# Patient Record
Sex: MALE | Race: BLACK OR AFRICAN AMERICAN | NOT HISPANIC OR LATINO | Employment: UNEMPLOYED | URBAN - METROPOLITAN AREA
[De-identification: names, ages, dates, MRNs, and addresses within clinical notes are randomized per-mention and may not be internally consistent; named-entity substitution may affect disease eponyms.]

---

## 2023-03-08 LAB
ALANINE AMINOTRANSFERASE (SGPT) (U/L) IN SER/PLAS: 18 U/L (ref 10–52)
ALBUMIN (G/DL) IN SER/PLAS: 3.9 G/DL (ref 3.4–5)
ALKALINE PHOSPHATASE (U/L) IN SER/PLAS: 56 U/L (ref 33–120)
ANION GAP IN SER/PLAS: 12 MMOL/L (ref 10–20)
APPEARANCE, URINE: ABNORMAL
ASPARTATE AMINOTRANSFERASE (SGOT) (U/L) IN SER/PLAS: 23 U/L (ref 9–39)
BASOPHILS (10*3/UL) IN BLOOD BY AUTOMATED COUNT: 0.07 X10E9/L (ref 0–0.1)
BASOPHILS/100 LEUKOCYTES IN BLOOD BY AUTOMATED COUNT: 0.7 % (ref 0–2)
BILIRUBIN TOTAL (MG/DL) IN SER/PLAS: 0.6 MG/DL (ref 0–1.2)
BILIRUBIN, URINE: NEGATIVE
BLOOD, URINE: NEGATIVE
CALCIDIOL (25 OH VITAMIN D3) (NG/ML) IN SER/PLAS: 14 NG/ML
CALCIUM (MG/DL) IN SER/PLAS: 9.1 MG/DL (ref 8.6–10.6)
CARBON DIOXIDE, TOTAL (MMOL/L) IN SER/PLAS: 28 MMOL/L (ref 21–32)
CHLORIDE (MMOL/L) IN SER/PLAS: 100 MMOL/L (ref 98–107)
CHOLESTEROL (MG/DL) IN SER/PLAS: 111 MG/DL (ref 0–199)
CHOLESTEROL IN HDL (MG/DL) IN SER/PLAS: 54.6 MG/DL
CHOLESTEROL/HDL RATIO: 2
COLOR, URINE: ABNORMAL
CREATININE (MG/DL) IN SER/PLAS: 1.11 MG/DL (ref 0.5–1.3)
EOSINOPHILS (10*3/UL) IN BLOOD BY AUTOMATED COUNT: 0.1 X10E9/L (ref 0–0.7)
EOSINOPHILS/100 LEUKOCYTES IN BLOOD BY AUTOMATED COUNT: 1 % (ref 0–6)
ERYTHROCYTE DISTRIBUTION WIDTH (RATIO) BY AUTOMATED COUNT: 12.2 % (ref 11.5–14.5)
ERYTHROCYTE MEAN CORPUSCULAR HEMOGLOBIN CONCENTRATION (G/DL) BY AUTOMATED: 35.8 G/DL (ref 32–36)
ERYTHROCYTE MEAN CORPUSCULAR VOLUME (FL) BY AUTOMATED COUNT: 79 FL (ref 80–100)
ERYTHROCYTES (10*6/UL) IN BLOOD BY AUTOMATED COUNT: 4.34 X10E12/L (ref 4.5–5.9)
FIBRIN D-DIMER (NG/ML FEU) IN PLATELET POOR PLASMA: 444 NG/ML FEU
GFR MALE: >90 ML/MIN/1.73M2
GLUCOSE (MG/DL) IN SER/PLAS: 72 MG/DL (ref 74–99)
GLUCOSE, URINE: NEGATIVE MG/DL
HEMATOCRIT (%) IN BLOOD BY AUTOMATED COUNT: 34.4 % (ref 41–52)
HEMOGLOBIN (G/DL) IN BLOOD: 12.3 G/DL (ref 13.5–17.5)
IMMATURE GRANULOCYTES/100 LEUKOCYTES IN BLOOD BY AUTOMATED COUNT: 0.2 % (ref 0–0.9)
KETONES, URINE: ABNORMAL MG/DL
LDL: 38 MG/DL (ref 0–99)
LEUKOCYTE ESTERASE, URINE: ABNORMAL
LEUKOCYTES (10*3/UL) IN BLOOD BY AUTOMATED COUNT: 9.9 X10E9/L (ref 4.4–11.3)
LYMPHOCYTES (10*3/UL) IN BLOOD BY AUTOMATED COUNT: 2.62 X10E9/L (ref 1.2–4.8)
LYMPHOCYTES/100 LEUKOCYTES IN BLOOD BY AUTOMATED COUNT: 26.5 % (ref 13–44)
MONOCYTES (10*3/UL) IN BLOOD BY AUTOMATED COUNT: 0.69 X10E9/L (ref 0.1–1)
MONOCYTES/100 LEUKOCYTES IN BLOOD BY AUTOMATED COUNT: 7 % (ref 2–10)
NEUTROPHILS (10*3/UL) IN BLOOD BY AUTOMATED COUNT: 6.37 X10E9/L (ref 1.2–7.7)
NEUTROPHILS/100 LEUKOCYTES IN BLOOD BY AUTOMATED COUNT: 64.6 % (ref 40–80)
NITRITE, URINE: NEGATIVE
NRBC (PER 100 WBCS) BY AUTOMATED COUNT: 0 /100 WBC (ref 0–0)
PH, URINE: 5 (ref 5–8)
PLATELETS (10*3/UL) IN BLOOD AUTOMATED COUNT: 286 X10E9/L (ref 150–450)
POTASSIUM (MMOL/L) IN SER/PLAS: 3.2 MMOL/L (ref 3.5–5.3)
PROTEIN TOTAL: 6.5 G/DL (ref 6.4–8.2)
PROTEIN, URINE: NEGATIVE MG/DL
RBC, URINE: 8 /HPF (ref 0–5)
SODIUM (MMOL/L) IN SER/PLAS: 137 MMOL/L (ref 136–145)
SPECIFIC GRAVITY, URINE: 1.03 (ref 1–1.03)
TRIGLYCERIDE (MG/DL) IN SER/PLAS: 93 MG/DL (ref 0–149)
UREA NITROGEN (MG/DL) IN SER/PLAS: 8 MG/DL (ref 6–23)
UROBILINOGEN, URINE: <2 MG/DL (ref 0–1.9)
VLDL: 19 MG/DL (ref 0–40)
WBC, URINE: ABNORMAL /HPF (ref 0–5)

## 2023-03-14 LAB
CYSTATIN C: 1.05 MG/L (ref 0.63–1.03)
EGFR BY CYSTATIN C: 82 ML/MIN/BSA

## 2023-03-21 ENCOUNTER — DOCUMENTATION (OUTPATIENT)
Dept: CARE COORDINATION | Facility: CLINIC | Age: 33
End: 2023-03-21
Payer: COMMERCIAL

## 2023-03-22 ENCOUNTER — PATIENT OUTREACH (OUTPATIENT)
Dept: CARE COORDINATION | Facility: CLINIC | Age: 33
End: 2023-03-22
Payer: COMMERCIAL

## 2023-05-31 LAB — GLUCOSE (MG/DL) IN SER/PLAS: 101 MG/DL (ref 74–99)

## 2023-06-01 LAB
GLUCOSE TOLERANCE TEST FASTING FOR 3 HOUR NON-PREGNANCY: 80 MG/DL
GLUCOSE TOLERANCE TEST ONE HOUR: 93 MG/DL
GLUCOSE TOLERANCE TEST THREE HOUR: NORMAL MG/DL
GLUCOSE TOLERANCE TEST TWO HOUR FOR 3 HOUR NON FASTING: 92 MG/DL
GTTC2: NORMAL

## 2023-11-23 ENCOUNTER — CLINICAL SUPPORT (OUTPATIENT)
Dept: CARDIOLOGY | Facility: CLINIC | Age: 33
End: 2023-11-23
Payer: COMMERCIAL

## 2023-11-23 ENCOUNTER — HOSPITAL ENCOUNTER (INPATIENT)
Facility: HOSPITAL | Age: 33
LOS: 2 days | Discharge: HOME | End: 2023-11-25
Attending: STUDENT IN AN ORGANIZED HEALTH CARE EDUCATION/TRAINING PROGRAM | Admitting: INTERNAL MEDICINE
Payer: COMMERCIAL

## 2023-11-23 ENCOUNTER — APPOINTMENT (OUTPATIENT)
Dept: RADIOLOGY | Facility: HOSPITAL | Age: 33
End: 2023-11-23
Payer: COMMERCIAL

## 2023-11-23 DIAGNOSIS — E87.5 HYPERKALEMIA: Primary | ICD-10-CM

## 2023-11-23 DIAGNOSIS — N17.9 AKI (ACUTE KIDNEY INJURY) (CMS-HCC): ICD-10-CM

## 2023-11-23 DIAGNOSIS — A41.9 SEPSIS, DUE TO UNSPECIFIED ORGANISM, UNSPECIFIED WHETHER ACUTE ORGAN DYSFUNCTION PRESENT (MULTI): ICD-10-CM

## 2023-11-23 DIAGNOSIS — R11.2 NAUSEA AND VOMITING, UNSPECIFIED VOMITING TYPE: ICD-10-CM

## 2023-11-23 LAB
ALBUMIN SERPL BCP-MCNC: 5.9 G/DL (ref 3.4–5)
ALP SERPL-CCNC: 89 U/L (ref 33–120)
ALT SERPL W P-5'-P-CCNC: 34 U/L (ref 10–52)
ANION GAP SERPL CALC-SCNC: 24 MMOL/L (ref 10–20)
AST SERPL W P-5'-P-CCNC: 41 U/L (ref 9–39)
BASOPHILS # BLD AUTO: 0.13 X10*3/UL (ref 0–0.1)
BASOPHILS NFR BLD AUTO: 0.5 %
BILIRUB SERPL-MCNC: 0.6 MG/DL (ref 0–1.2)
BUN SERPL-MCNC: 26 MG/DL (ref 6–23)
CALCIUM SERPL-MCNC: 11.2 MG/DL (ref 8.6–10.3)
CHLORIDE SERPL-SCNC: 96 MMOL/L (ref 98–107)
CO2 SERPL-SCNC: 18 MMOL/L (ref 21–32)
CREAT SERPL-MCNC: 3.5 MG/DL (ref 0.5–1.3)
EOSINOPHIL # BLD AUTO: 0.01 X10*3/UL (ref 0–0.7)
EOSINOPHIL NFR BLD AUTO: 0 %
ERYTHROCYTE [DISTWIDTH] IN BLOOD BY AUTOMATED COUNT: 14.6 % (ref 11.5–14.5)
GFR SERPL CREATININE-BSD FRML MDRD: 23 ML/MIN/1.73M*2
GLUCOSE SERPL-MCNC: 93 MG/DL (ref 74–99)
HCT VFR BLD AUTO: 55.8 % (ref 41–52)
HGB BLD-MCNC: 19.6 G/DL (ref 13.5–17.5)
IMM GRANULOCYTES # BLD AUTO: 0.21 X10*3/UL (ref 0–0.7)
IMM GRANULOCYTES NFR BLD AUTO: 0.8 % (ref 0–0.9)
LACTATE SERPL-SCNC: 2.5 MMOL/L (ref 0.4–2)
LACTATE SERPL-SCNC: 3.5 MMOL/L (ref 0.4–2)
LIPASE SERPL-CCNC: 77 U/L (ref 9–82)
LYMPHOCYTES # BLD AUTO: 2.4 X10*3/UL (ref 1.2–4.8)
LYMPHOCYTES NFR BLD AUTO: 9.4 %
MCH RBC QN AUTO: 28.9 PG (ref 26–34)
MCHC RBC AUTO-ENTMCNC: 35.1 G/DL (ref 32–36)
MCV RBC AUTO: 82 FL (ref 80–100)
MONOCYTES # BLD AUTO: 1.62 X10*3/UL (ref 0.1–1)
MONOCYTES NFR BLD AUTO: 6.3 %
NEUTROPHILS # BLD AUTO: 21.26 X10*3/UL (ref 1.2–7.7)
NEUTROPHILS NFR BLD AUTO: 83 %
NRBC BLD-RTO: 0 /100 WBCS (ref 0–0)
PLATELET # BLD AUTO: 374 X10*3/UL (ref 150–450)
POTASSIUM SERPL-SCNC: 7.7 MMOL/L (ref 3.5–5.3)
PROT SERPL-MCNC: 11.2 G/DL (ref 6.4–8.2)
RBC # BLD AUTO: 6.79 X10*6/UL (ref 4.5–5.9)
SODIUM SERPL-SCNC: 130 MMOL/L (ref 136–145)
WBC # BLD AUTO: 25.6 X10*3/UL (ref 4.4–11.3)

## 2023-11-23 PROCEDURE — 36415 COLL VENOUS BLD VENIPUNCTURE: CPT | Performed by: STUDENT IN AN ORGANIZED HEALTH CARE EDUCATION/TRAINING PROGRAM

## 2023-11-23 PROCEDURE — 71045 X-RAY EXAM CHEST 1 VIEW: CPT | Performed by: RADIOLOGY

## 2023-11-23 PROCEDURE — 71045 X-RAY EXAM CHEST 1 VIEW: CPT

## 2023-11-23 PROCEDURE — 2060000001 HC INTERMEDIATE ICU ROOM DAILY

## 2023-11-23 PROCEDURE — 2500000004 HC RX 250 GENERAL PHARMACY W/ HCPCS (ALT 636 FOR OP/ED): Performed by: INTERNAL MEDICINE

## 2023-11-23 PROCEDURE — 83605 ASSAY OF LACTIC ACID: CPT | Performed by: STUDENT IN AN ORGANIZED HEALTH CARE EDUCATION/TRAINING PROGRAM

## 2023-11-23 PROCEDURE — 96365 THER/PROPH/DIAG IV INF INIT: CPT

## 2023-11-23 PROCEDURE — 2500000002 HC RX 250 W HCPCS SELF ADMINISTERED DRUGS (ALT 637 FOR MEDICARE OP, ALT 636 FOR OP/ED): Performed by: STUDENT IN AN ORGANIZED HEALTH CARE EDUCATION/TRAINING PROGRAM

## 2023-11-23 PROCEDURE — 85025 COMPLETE CBC W/AUTO DIFF WBC: CPT | Performed by: STUDENT IN AN ORGANIZED HEALTH CARE EDUCATION/TRAINING PROGRAM

## 2023-11-23 PROCEDURE — 83690 ASSAY OF LIPASE: CPT | Performed by: STUDENT IN AN ORGANIZED HEALTH CARE EDUCATION/TRAINING PROGRAM

## 2023-11-23 PROCEDURE — 99285 EMERGENCY DEPT VISIT HI MDM: CPT | Mod: 25,27 | Performed by: STUDENT IN AN ORGANIZED HEALTH CARE EDUCATION/TRAINING PROGRAM

## 2023-11-23 PROCEDURE — 87075 CULTR BACTERIA EXCEPT BLOOD: CPT | Mod: PARLAB | Performed by: STUDENT IN AN ORGANIZED HEALTH CARE EDUCATION/TRAINING PROGRAM

## 2023-11-23 PROCEDURE — 87040 BLOOD CULTURE FOR BACTERIA: CPT | Mod: PARLAB | Performed by: STUDENT IN AN ORGANIZED HEALTH CARE EDUCATION/TRAINING PROGRAM

## 2023-11-23 PROCEDURE — 96367 TX/PROPH/DG ADDL SEQ IV INF: CPT

## 2023-11-23 PROCEDURE — 2500000001 HC RX 250 WO HCPCS SELF ADMINISTERED DRUGS (ALT 637 FOR MEDICARE OP): Performed by: STUDENT IN AN ORGANIZED HEALTH CARE EDUCATION/TRAINING PROGRAM

## 2023-11-23 PROCEDURE — 74176 CT ABD & PELVIS W/O CONTRAST: CPT | Performed by: RADIOLOGY

## 2023-11-23 PROCEDURE — 80053 COMPREHEN METABOLIC PANEL: CPT | Performed by: STUDENT IN AN ORGANIZED HEALTH CARE EDUCATION/TRAINING PROGRAM

## 2023-11-23 PROCEDURE — 2500000004 HC RX 250 GENERAL PHARMACY W/ HCPCS (ALT 636 FOR OP/ED): Performed by: STUDENT IN AN ORGANIZED HEALTH CARE EDUCATION/TRAINING PROGRAM

## 2023-11-23 PROCEDURE — 74176 CT ABD & PELVIS W/O CONTRAST: CPT

## 2023-11-23 PROCEDURE — 2500000005 HC RX 250 GENERAL PHARMACY W/O HCPCS: Performed by: STUDENT IN AN ORGANIZED HEALTH CARE EDUCATION/TRAINING PROGRAM

## 2023-11-23 PROCEDURE — 96375 TX/PRO/DX INJ NEW DRUG ADDON: CPT

## 2023-11-23 PROCEDURE — 93005 ELECTROCARDIOGRAM TRACING: CPT

## 2023-11-23 RX ORDER — SODIUM POLYSTYRENE SULFONATE 15 G/60ML
30 SUSPENSION ORAL; RECTAL ONCE
Status: COMPLETED | OUTPATIENT
Start: 2023-11-23 | End: 2023-11-23

## 2023-11-23 RX ORDER — CALCIUM GLUCONATE 20 MG/ML
2 INJECTION, SOLUTION INTRAVENOUS ONCE
Status: COMPLETED | OUTPATIENT
Start: 2023-11-23 | End: 2023-11-23

## 2023-11-23 RX ORDER — ACETAMINOPHEN 160 MG/5ML
650 SOLUTION ORAL EVERY 4 HOURS PRN
Status: DISCONTINUED | OUTPATIENT
Start: 2023-11-23 | End: 2023-11-23

## 2023-11-23 RX ORDER — ONDANSETRON 4 MG/1
4 TABLET, FILM COATED ORAL EVERY 8 HOURS PRN
Status: DISCONTINUED | OUTPATIENT
Start: 2023-11-23 | End: 2023-11-23

## 2023-11-23 RX ORDER — MORPHINE SULFATE 4 MG/ML
4 INJECTION, SOLUTION INTRAMUSCULAR; INTRAVENOUS ONCE
Status: COMPLETED | OUTPATIENT
Start: 2023-11-23 | End: 2023-11-23

## 2023-11-23 RX ORDER — DEXTROSE 50 % IN WATER (D50W) INTRAVENOUS SYRINGE
25 ONCE
Status: COMPLETED | OUTPATIENT
Start: 2023-11-23 | End: 2023-11-23

## 2023-11-23 RX ORDER — TALC
3 POWDER (GRAM) TOPICAL DAILY
Status: DISCONTINUED | OUTPATIENT
Start: 2023-11-23 | End: 2023-11-23

## 2023-11-23 RX ORDER — ACETAMINOPHEN 325 MG/1
650 TABLET ORAL EVERY 4 HOURS PRN
Status: DISCONTINUED | OUTPATIENT
Start: 2023-11-23 | End: 2023-11-23

## 2023-11-23 RX ORDER — DEXTROSE MONOHYDRATE AND SODIUM CHLORIDE 5; .9 G/100ML; G/100ML
150 INJECTION, SOLUTION INTRAVENOUS CONTINUOUS
Status: DISCONTINUED | OUTPATIENT
Start: 2023-11-23 | End: 2023-11-23

## 2023-11-23 RX ORDER — DEXTROSE MONOHYDRATE AND SODIUM CHLORIDE 5; .9 G/100ML; G/100ML
200 INJECTION, SOLUTION INTRAVENOUS CONTINUOUS
Status: DISCONTINUED | OUTPATIENT
Start: 2023-11-23 | End: 2023-11-25 | Stop reason: HOSPADM

## 2023-11-23 RX ORDER — ONDANSETRON HYDROCHLORIDE 2 MG/ML
4 INJECTION, SOLUTION INTRAVENOUS EVERY 8 HOURS PRN
Status: DISCONTINUED | OUTPATIENT
Start: 2023-11-23 | End: 2023-11-23

## 2023-11-23 RX ORDER — ACETAMINOPHEN 650 MG/1
650 SUPPOSITORY RECTAL EVERY 4 HOURS PRN
Status: DISCONTINUED | OUTPATIENT
Start: 2023-11-23 | End: 2023-11-23

## 2023-11-23 RX ORDER — ONDANSETRON HYDROCHLORIDE 2 MG/ML
4 INJECTION, SOLUTION INTRAVENOUS EVERY 6 HOURS PRN
Status: DISCONTINUED | OUTPATIENT
Start: 2023-11-23 | End: 2023-11-25 | Stop reason: HOSPADM

## 2023-11-23 RX ORDER — ONDANSETRON HYDROCHLORIDE 2 MG/ML
4 INJECTION, SOLUTION INTRAVENOUS ONCE
Status: COMPLETED | OUTPATIENT
Start: 2023-11-23 | End: 2023-11-23

## 2023-11-23 RX ORDER — ONDANSETRON HYDROCHLORIDE 2 MG/ML
4 INJECTION, SOLUTION INTRAVENOUS EVERY 6 HOURS PRN
Status: DISCONTINUED | OUTPATIENT
Start: 2023-11-23 | End: 2023-11-23

## 2023-11-23 RX ADMIN — CALCIUM GLUCONATE 2 G: 20 INJECTION, SOLUTION INTRAVENOUS at 18:24

## 2023-11-23 RX ADMIN — SODIUM POLYSTYRENE SULFONATE 30 G: 15 SUSPENSION ORAL; RECTAL at 18:24

## 2023-11-23 RX ADMIN — INSULIN HUMAN 10 UNITS: 100 INJECTION, SOLUTION PARENTERAL at 18:25

## 2023-11-23 RX ADMIN — MORPHINE SULFATE 4 MG: 4 INJECTION, SOLUTION INTRAMUSCULAR; INTRAVENOUS at 17:03

## 2023-11-23 RX ADMIN — SODIUM CHLORIDE 1000 ML: 9 INJECTION, SOLUTION INTRAVENOUS at 18:32

## 2023-11-23 RX ADMIN — SODIUM CHLORIDE 1000 ML: 9 INJECTION, SOLUTION INTRAVENOUS at 17:03

## 2023-11-23 RX ADMIN — PIPERACILLIN SODIUM AND TAZOBACTAM SODIUM 4.5 G: 4; .5 INJECTION, SOLUTION INTRAVENOUS at 18:24

## 2023-11-23 RX ADMIN — DEXTROSE MONOHYDRATE 25 G: 25 INJECTION, SOLUTION INTRAVENOUS at 18:24

## 2023-11-23 RX ADMIN — ONDANSETRON 4 MG: 2 INJECTION INTRAMUSCULAR; INTRAVENOUS at 17:03

## 2023-11-23 RX ADMIN — DEXTROSE MONOHYDRATE AND SODIUM CHLORIDE 200 ML/HR: 5; .9 INJECTION, SOLUTION INTRAVENOUS at 22:20

## 2023-11-23 SDOH — SOCIAL STABILITY: SOCIAL INSECURITY: ABUSE: ADULT

## 2023-11-23 SDOH — SOCIAL STABILITY: SOCIAL INSECURITY: HAVE YOU HAD THOUGHTS OF HARMING ANYONE ELSE?: NO

## 2023-11-23 SDOH — SOCIAL STABILITY: SOCIAL INSECURITY: DO YOU FEEL ANYONE HAS EXPLOITED OR TAKEN ADVANTAGE OF YOU FINANCIALLY OR OF YOUR PERSONAL PROPERTY?: NO

## 2023-11-23 SDOH — SOCIAL STABILITY: SOCIAL INSECURITY: WERE YOU ABLE TO COMPLETE ALL THE BEHAVIORAL HEALTH SCREENINGS?: YES

## 2023-11-23 SDOH — SOCIAL STABILITY: SOCIAL INSECURITY: DOES ANYONE TRY TO KEEP YOU FROM HAVING/CONTACTING OTHER FRIENDS OR DOING THINGS OUTSIDE YOUR HOME?: NO

## 2023-11-23 SDOH — SOCIAL STABILITY: SOCIAL INSECURITY: ARE YOU OR HAVE YOU BEEN THREATENED OR ABUSED PHYSICALLY, EMOTIONALLY, OR SEXUALLY BY ANYONE?: NO

## 2023-11-23 SDOH — SOCIAL STABILITY: SOCIAL INSECURITY: ARE THERE ANY APPARENT SIGNS OF INJURIES/BEHAVIORS THAT COULD BE RELATED TO ABUSE/NEGLECT?: NO

## 2023-11-23 SDOH — SOCIAL STABILITY: SOCIAL INSECURITY: DO YOU FEEL UNSAFE GOING BACK TO THE PLACE WHERE YOU ARE LIVING?: NO

## 2023-11-23 SDOH — SOCIAL STABILITY: SOCIAL INSECURITY: HAS ANYONE EVER THREATENED TO HURT YOUR FAMILY OR YOUR PETS?: NO

## 2023-11-23 ASSESSMENT — ACTIVITIES OF DAILY LIVING (ADL)
JUDGMENT_ADEQUATE_SAFELY_COMPLETE_DAILY_ACTIVITIES: YES
LACK_OF_TRANSPORTATION: NO
DRESSING YOURSELF: INDEPENDENT
GROOMING: INDEPENDENT
HEARING - RIGHT EAR: FUNCTIONAL
BATHING: INDEPENDENT
ADEQUATE_TO_COMPLETE_ADL: YES
PATIENT'S MEMORY ADEQUATE TO SAFELY COMPLETE DAILY ACTIVITIES?: YES
FEEDING YOURSELF: INDEPENDENT
WALKS IN HOME: INDEPENDENT
HEARING - LEFT EAR: FUNCTIONAL
TOILETING: INDEPENDENT

## 2023-11-23 ASSESSMENT — COGNITIVE AND FUNCTIONAL STATUS - GENERAL
DAILY ACTIVITIY SCORE: 24
PATIENT BASELINE BEDBOUND: NO
MOBILITY SCORE: 24

## 2023-11-23 ASSESSMENT — PAIN SCALES - GENERAL: PAINLEVEL_OUTOF10: 0 - NO PAIN

## 2023-11-23 ASSESSMENT — PAIN - FUNCTIONAL ASSESSMENT: PAIN_FUNCTIONAL_ASSESSMENT: 0-10

## 2023-11-23 ASSESSMENT — LIFESTYLE VARIABLES
HOW OFTEN DO YOU HAVE A DRINK CONTAINING ALCOHOL: NEVER
AUDIT-C TOTAL SCORE: 0
AUDIT-C TOTAL SCORE: 0
SKIP TO QUESTIONS 9-10: 1
HOW OFTEN DO YOU HAVE 6 OR MORE DRINKS ON ONE OCCASION: NEVER
HOW MANY STANDARD DRINKS CONTAINING ALCOHOL DO YOU HAVE ON A TYPICAL DAY: PATIENT DOES NOT DRINK

## 2023-11-23 ASSESSMENT — PATIENT HEALTH QUESTIONNAIRE - PHQ9
SUM OF ALL RESPONSES TO PHQ9 QUESTIONS 1 & 2: 0
2. FEELING DOWN, DEPRESSED OR HOPELESS: NOT AT ALL
1. LITTLE INTEREST OR PLEASURE IN DOING THINGS: NOT AT ALL

## 2023-11-23 ASSESSMENT — COLUMBIA-SUICIDE SEVERITY RATING SCALE - C-SSRS
2. HAVE YOU ACTUALLY HAD ANY THOUGHTS OF KILLING YOURSELF?: NO
6. HAVE YOU EVER DONE ANYTHING, STARTED TO DO ANYTHING, OR PREPARED TO DO ANYTHING TO END YOUR LIFE?: NO
1. IN THE PAST MONTH, HAVE YOU WISHED YOU WERE DEAD OR WISHED YOU COULD GO TO SLEEP AND NOT WAKE UP?: NO

## 2023-11-23 NOTE — ED PROVIDER NOTES
EMERGENCY MEDICINE EVALUATION NOTE    History of Present Illness     Chief Complaint:   Chief Complaint   Patient presents with    Nausea     Pt c/o nausea and vomiting since 3am . Pt has osotomy and j pouch       HPI: Ingrid Maurice is a 32 y.o. male with past medical history of familial adenomatosis polyposis and remote colostomy as well as right nephrectomy presents with complaint of nausea, vomiting, and abdominal pain.  Patient states starting at around 0300 today he developed a sudden onset of nausea, vomiting, and generalized abdominal pain.  He does state some liquid stool in his colostomy bag although denies any blood noted.  Denies any hematemesis.  He does admit diffuse generalized abdominal pain as well as multiple episodes of nonbloody emesis throughout today.  He does admit multiple similar symptoms in the past.  Denies any change in urination, dysuria, fever, chills, chest pain, shortness of breath.    Previous History     Past Medical History:   Diagnosis Date    Hemorrhage of anus and rectum     Hemorrhage of rectum and anus     Past Surgical History:   Procedure Laterality Date    ABDOMINAL ADHESION SURGERY  10/14/2013    Laparoscopic Lysis Of Intestinal Adhesions    CT GUIDED PERCUTANEOUS PERITONEAL OR RETROPERITONEAL FLUID COLLECTION DRAINAGE  7/3/2018    CT GUIDED PERCUTANEOUS PERITONEAL OR RETROPERITONEAL FLUID COLLECTION DRAINAGE 7/3/2018 [facility] CLINICAL LEGACY    EXPLORATORY LAPAROTOMY  10/14/2013    Exploratory Laparotomy    IR GI TUBE EXCHANGE  1/8/2013    IR GI TUBE EXCHANGE 1/8/2013 [facility]    IR GI TUBE EXCHANGE  3/13/2013    IR GI TUBE EXCHANGE 3/13/2013 [facility] CLINICAL LEGACY    IR GI TUBE EXCHANGE  4/12/2013    IR GI TUBE EXCHANGE 4/12/2013 [facility] EMERGENCY LEGACY    IR GI TUBE EXCHANGE  6/19/2013    IR GI TUBE EXCHANGE 6/19/2013 [facility] CLINICAL LEGACY    IR GI TUBE EXCHANGE  7/24/2013    IR GI TUBE EXCHANGE 7/24/2013 [facility] CLINICAL LEGACY    IR GI TUBE EXCHANGE  11/25/2013    IR GI  TUBE EXCHANGE 11/25/2013 [facility] CLINICAL LEGACY    IR GI TUBE EXCHANGE  3/6/2014    IR GI TUBE EXCHANGE 3/6/2014 [facility] CLINICAL LEGACY    IR GI TUBE EXCHANGE  5/2/2014    IR GI TUBE EXCHANGE 5/2/2014 [facility] CLINICAL LEGACY    IR GI TUBE EXCHANGE  6/9/2014    IR GI TUBE EXCHANGE 6/9/2014 [facility]    IR GI TUBE EXCHANGE  7/14/2014    IR GI TUBE EXCHANGE 7/14/2014 [facility] CLINICAL LEGACY    IR GI TUBE EXCHANGE  8/18/2014    IR GI TUBE EXCHANGE 8/18/2014 [facility] CLINICAL LEGACY    IR GI TUBE EXCHANGE  10/7/2014    IR GI TUBE EXCHANGE 10/7/2014 [facility]    IR GI TUBE EXCHANGE  11/30/2014    IR GI TUBE EXCHANGE 11/30/2014 [facility] CLINICAL LEGACY    IR GI TUBE EXCHANGE  12/30/2014    IR GI TUBE EXCHANGE 12/30/2014 [facility]    IR GI TUBE EXCHANGE  4/8/2015    IR GI TUBE EXCHANGE 4/8/2015 [facility]    IR GI TUBE EXCHANGE  6/5/2015    IR GI TUBE EXCHANGE 6/5/2015 [facility]    IR GI TUBE EXCHANGE  8/3/2015    IR GI TUBE EXCHANGE 8/3/2015 [facility] CLINICAL LEGACY    IR GI TUBE EXCHANGE  11/25/2015    IR GI TUBE EXCHANGE 11/25/2015 [facility]    IR GI TUBE EXCHANGE  12/28/2015    IR GI TUBE EXCHANGE 12/28/2015 [facility] CLINICAL LEGACY    IR GI TUBE EXCHANGE  3/21/2012    IR GI TUBE EXCHANGE 3/21/2012 [facility]    IR GI TUBE EXCHANGE  5/24/2012    IR GI TUBE EXCHANGE 5/24/2012 [facility]    IR GI TUBE EXCHANGE  8/7/2012    IR GI TUBE EXCHANGE 8/7/2012 [facility]    IR GI TUBE EXCHANGE  9/5/2012    IR GI TUBE EXCHANGE 9/5/2012 [facility]    IR GI TUBE EXCHANGE  11/5/2012    IR GI TUBE EXCHANGE 11/5/2012 [facility] CLINICAL LEGACY    IR NEPHROSTOMY TUBE EXCHANGE  1/8/2013    IR NEPHROSTOMY TUBE EXCHANGE 1/8/2013 [facility]    IR NEPHROSTOMY TUBE EXCHANGE  3/13/2013    IR NEPHROSTOMY TUBE EXCHANGE 3/13/2013 [facility] CLINICAL LEGACY    IR NEPHROSTOMY TUBE EXCHANGE  4/12/2013    IR NEPHROSTOMY TUBE EXCHANGE 4/12/2013 [facility]    IR NEPHROSTOMY TUBE EXCHANGE  6/19/2013    IR NEPHROSTOMY TUBE  EXCHANGE 6/19/2013 [facility] CLINICAL LEGACY    IR NEPHROSTOMY TUBE EXCHANGE  7/24/2013    IR NEPHROSTOMY TUBE EXCHANGE 7/24/2013 [facility] CLINICAL LEGACY    IR NEPHROSTOMY TUBE EXCHANGE  11/25/2013    IR NEPHROSTOMY TUBE EXCHANGE 11/25/2013 [facility] CLINICAL LEGACY    IR NEPHROSTOMY TUBE EXCHANGE  3/6/2014    IR NEPHROSTOMY TUBE EXCHANGE 3/6/2014 [facility] CLINICAL LEGACY    IR NEPHROSTOMY TUBE EXCHANGE  5/2/2014    IR NEPHROSTOMY TUBE EXCHANGE 5/2/2014 [facility] CLINICAL LEGACY    IR NEPHROSTOMY TUBE EXCHANGE  6/9/2014    IR NEPHROSTOMY TUBE EXCHANGE 6/9/2014 [facility]    IR NEPHROSTOMY TUBE EXCHANGE  7/14/2014    IR NEPHROSTOMY TUBE EXCHANGE 7/14/2014 [facility] CLINICAL LEGACY    IR NEPHROSTOMY TUBE EXCHANGE  8/18/2014    IR NEPHROSTOMY TUBE EXCHANGE 8/18/2014 [facility] CLINICAL LEGACY    IR NEPHROSTOMY TUBE EXCHANGE  10/7/2014    IR NEPHROSTOMY TUBE EXCHANGE 10/7/2014 [facility]    IR NEPHROSTOMY TUBE EXCHANGE  11/30/2014    IR NEPHROSTOMY TUBE EXCHANGE 11/30/2014 [facility] CLINICAL LEGACY    IR NEPHROSTOMY TUBE EXCHANGE  12/30/2014    IR NEPHROSTOMY TUBE EXCHANGE 12/30/2014 [facility]    IR NEPHROSTOMY TUBE EXCHANGE  4/8/2015    IR NEPHROSTOMY TUBE EXCHANGE 4/8/2015 [facility]    IR NEPHROSTOMY TUBE EXCHANGE  6/5/2015    IR NEPHROSTOMY TUBE EXCHANGE 6/5/2015 [facility]    IR NEPHROSTOMY TUBE EXCHANGE  8/3/2015    IR NEPHROSTOMY TUBE EXCHANGE 8/3/2015 [facility] CLINICAL LEGACY    IR NEPHROSTOMY TUBE EXCHANGE  10/1/2015    IR NEPHROSTOMY TUBE EXCHANGE 10/1/2015 [facility]    IR NEPHROSTOMY TUBE EXCHANGE  11/25/2015    IR NEPHROSTOMY TUBE EXCHANGE 11/25/2015 [facility]    IR NEPHROSTOMY TUBE EXCHANGE  12/28/2015    IR NEPHROSTOMY TUBE EXCHANGE 12/28/2015 [facility] CLINICAL LEGACY    IR NEPHROSTOMY TUBE EXCHANGE  1/28/2016    IR NEPHROSTOMY TUBE EXCHANGE 1/28/2016 [facility]    IR NEPHROSTOMY TUBE EXCHANGE  1/10/2018    IR NEPHROSTOMY TUBE EXCHANGE 1/10/2018 [facility]    IR NEPHROSTOMY TUBE EXCHANGE  4/30/2018    IR  NEPHROSTOMY TUBE EXCHANGE 4/30/2018 [facility]    IR NEPHROSTOMY TUBE EXCHANGE  5/22/2018    IR NEPHROSTOMY TUBE EXCHANGE 5/22/2018 [facility]    IR NEPHROSTOMY TUBE EXCHANGE  1/31/2017    IR NEPHROSTOMY TUBE EXCHANGE 1/31/2017 [facility]    IR NEPHROSTOMY TUBE EXCHANGE  3/15/2017    IR NEPHROSTOMY TUBE EXCHANGE 3/15/2017 [facility]    IR NEPHROSTOMY TUBE EXCHANGE  4/19/2017    IR NEPHROSTOMY TUBE EXCHANGE 4/19/2017 [facility]    IR NEPHROSTOMY TUBE EXCHANGE  5/18/2017    IR NEPHROSTOMY TUBE EXCHANGE 5/18/2017 [facility]    IR NEPHROSTOMY TUBE EXCHANGE  6/29/2017    IR NEPHROSTOMY TUBE EXCHANGE 6/29/2017 [facility]    IR NEPHROSTOMY TUBE EXCHANGE  8/17/2017    IR NEPHROSTOMY TUBE EXCHANGE 8/17/2017 [facility]    IR NEPHROSTOMY TUBE EXCHANGE  10/13/2017    IR NEPHROSTOMY TUBE EXCHANGE 10/13/2017 [facility]    IR NEPHROSTOMY TUBE EXCHANGE  3/21/2012    IR NEPHROSTOMY TUBE EXCHANGE 3/21/2012 [facility]    IR NEPHROSTOMY TUBE EXCHANGE  5/24/2012    IR NEPHROSTOMY TUBE EXCHANGE 5/24/2012 [facility]    IR NEPHROSTOMY TUBE EXCHANGE  8/7/2012    IR NEPHROSTOMY TUBE EXCHANGE 8/7/2012 [facility]    IR NEPHROSTOMY TUBE EXCHANGE  9/5/2012    IR NEPHROSTOMY TUBE EXCHANGE 9/5/2012 [facility]    IR NEPHROSTOMY TUBE EXCHANGE  11/5/2012    IR NEPHROSTOMY TUBE EXCHANGE 11/5/2012 [facility] CLINICAL LEGACY    OTHER SURGICAL HISTORY  10/14/2013    Laparos Total Colectomy W/ Proctectomy, Ileoanal Anastomosis    OTHER SURGICAL HISTORY  10/14/2013    [place] Pouch Endoscopy    OTHER SURGICAL HISTORY  10/14/2013    Ileostomy Non-Tube    OTHER SURGICAL HISTORY  10/14/2013    Ileostomy Revision Complex    US GUIDED PERCUTANEOUS PERITONEAL OR RETROPERITONEAL FLUID COLLECTION DRAINAGE  1/2/2015    US GUIDED PERCUTANEOUS PERITONEAL OR RETROPERITONEAL FLUID COLLECTION DRAINAGE 1/2/2015 [facility] EMERGENCY LEGACY        No family history on file.  Allergies   Allergen Reactions    Coconut  Anaphylaxis and Swelling     Throat Swelling    Hydrocodone-Acetaminophen Other and Hives     tolerated 09/12 without reaction     No current outpatient medications    Physical Exam     Appearance: Alert, oriented , cooperative,  in acute distress. Well nourished & well hydrated.     Skin: Intact,  dry skin, no lesions, rash, petechiae or purpura.      Eyes: PERRLA, EOMs intact,  Conjunctiva pink with no redness or exudates. Cornea & anterior chamber are clear, Eyelids without lesions. No scleral icterus.      ENT: Hearing grossly intact. External auditory canals patent, tympanic membranes intact with visible landmarks. Nares patent, mucus membranes moist. Dentition without lesions. Pharynx clear, uvula midline.      Neck: Supple, without meningismus. Thyroid not palpable. Trachea at midline. No lymphadenopathy.     Pulmonary: Clear bilaterally with good chest wall excursion. No rales, rhonchi or wheezing. No accessory muscle use or stridor.     Cardiac: Tachycardic and otherwise normal S1, S2 without murmur, rub, gallop or extrasystole. No JVD, Carotids without bruits.     Abdomen: Soft, moderate generalized abdominal tenderness, colostomy with liquid stool in place without any blood noted, active bowel sounds.  No palpable organomegaly.  No rebound or guarding.  No CVA tenderness.     Genitourinary: Exam deferred.     Musculoskeletal: Full range of motion. no pain, edema, or deformity. Pulses full and equal. No cyanosis or clubbing.      Neurological:  Cranial nerves II through XII are grossly intact, finger-nose touch is normal, normal sensation, no weakness, no focal findings identified.     Psychiatric: Appropriate mood and affect.      Results     Labs Reviewed   COMPREHENSIVE METABOLIC PANEL - Abnormal       Result Value    Glucose 93      Sodium 130 (*)     Potassium 7.7 (*)     Chloride 96 (*)     Bicarbonate 18 (*)     Anion Gap 24 (*)     Urea Nitrogen 26 (*)     Creatinine 3.50 (*)     eGFR 23 (*)      Calcium 11.2 (*)     Albumin 5.9 (*)     Alkaline Phosphatase 89      Total Protein 11.2 (*)     AST 41 (*)     Bilirubin, Total 0.6      ALT 34     CBC WITH AUTO DIFFERENTIAL - Abnormal    WBC 25.6 (*)     nRBC 0.0      RBC 6.79 (*)     Hemoglobin 19.6 (*)     Hematocrit 55.8 (*)     MCV 82      MCH 28.9      MCHC 35.1      RDW 14.6 (*)     Platelets 374      Neutrophils % 83.0      Immature Granulocytes %, Automated 0.8      Lymphocytes % 9.4      Monocytes % 6.3      Eosinophils % 0.0      Basophils % 0.5      Neutrophils Absolute 21.26 (*)     Immature Granulocytes Absolute, Automated 0.21      Lymphocytes Absolute 2.40      Monocytes Absolute 1.62 (*)     Eosinophils Absolute 0.01      Basophils Absolute 0.13 (*)    LIPASE - Normal    Lipase 77      Narrative:     Venipuncture immediately after or during the administration of Metamizole may lead to falsely low results. Testing should be performed immediately prior to Metamizole dosing.   BLOOD CULTURE   BLOOD CULTURE   URINALYSIS WITH REFLEX MICROSCOPIC AND CULTURE   LACTATE   POTASSIUM   POCT GLUCOSE METER     XR chest 1 view   Final Result   No airspace consolidation or pleural effusion.        MACRO:   None        Signed by: Vijay Raphael 11/23/2023 7:03 PM   Dictation workstation:   [id number]      CT abdomen pelvis wo IV contrast   Final Result   Status post right nephrectomy and colectomy.        No significant bowel distention or evidence of bowel wall thickening        There is no free air free fluid collection in the abdomen or pelvis.   There is no obvious infiltration of the bowel mesentery.        The remainder of the abdomen and pelvis is unremarkable allowing for   limitations of an unenhanced study.        Signed by: Jonny Seals 11/23/2023 6:24 PM   Dictation workstation:   [id number]            ED Course & Medical Decision Making     Medications   calcium gluconate in NS IV 2 g (2 g intravenous New Bag 11/23/23 0826)   sodium chloride  "0.9 % bolus 1,000 mL (1,000 mL intravenous New Bag 11/23/23 1832)   vancomycin (Vancocin) 2 g in dextrose 5 % in water (D5W) 500 mL IV (has no administration in time range)   promethazine (Phenergan) in 0.9 % sodium chloride 50 mL IV 25 mg (has no administration in time range)   ondansetron (Zofran) injection 4 mg (4 mg intravenous Given 11/23/23 1703)   morphine injection 4 mg (4 mg intravenous Given 11/23/23 1703)   sodium chloride 0.9 % bolus 1,000 mL (0 mL intravenous Stopped 11/23/23 1733)   dextrose 50 % injection 25 g (25 g intravenous Given 11/23/23 1824)   insulin regular (HumuLIN R) injection 10 Units (10 Units intravenous Given 11/23/23 1825)   sodium polystyrene (Kayexalate) suspension 30 g (30 g oral Given 11/23/23 1824)   piperacillin-tazobactam-dextrose (Zosyn) IV 4.5 g (4.5 g intravenous New Bag 11/23/23 1824)     Diagnoses as of 11/23/23 1906   Hyperkalemia   Sepsis, due to unspecified organism, unspecified whether acute organ dysfunction present (CMS/HCC)   Nausea and vomiting, unspecified vomiting type   FABRICIO (acute kidney injury) (CMS/HCC)     Heart Rate:  []   Temp:  [36.5 °C (97.7 °F)]   Resp:  [12-18]   BP: (116)/(61)   Height:  [172.7 cm (5' 8\")]   Weight:  [66 kg (145 lb 8.1 oz)]   SpO2:  [97 %]      Ingrid Maurice is a 32 y.o. male with past medical history of familial adenomatosis polyposis and remote colostomy as well as right nephrectomy presents with complaint of nausea, vomiting, and abdominal pain.  Patient was in significant distress on initial examination.  Does have diffuse abdominal tenderness noted.  He was initially mildly tachycardic with a heart rate of 112 and otherwise normotensive, afebrile, saturating 97% on room air.  Low concern for sepsis although considered.  I do have concern for most likely intra-abdominal pathology such as viscus perforation, appendicitis, volvulus, small bowel obstruction, or other intra-abdominal pathology.  Patient did receive 2 L normal " saline for fluid resuscitation.  He did also receive morphine, Zofran, and Phenergan for treatment.  Initial lab work was consistent with severe electrolyte derangements with a hyperkalemia of 7.7 noted as well as hyponatremia 130.  Acute kidney injury also noted with elevated creatinine of 3.5 and BUN of 26.  LFTs and lipase otherwise nonacute.  Patient did have significant leukocytosis of 25.6 with a left shift as well as elevated hemoglobin 19.6.  Blood cultures were drawn and he was covered with IV vancomycin and Zosyn for broad-spectrum treatment.  CT scan of the abdomen pelvis otherwise without any significant acute findings.  Chest x-ray was negative.  Still pending urinalysis.  No obvious source of infection at this time.  He did have resolution of his tachycardia as well as significant leaf of symptoms and reexamination.  EKG did show hyperacute T waves and otherwise a normal sinus rhythm with a rate of 99 bpm and no other significant ST elevations noted or signs of ischemia.  Given these findings he was treated with insulin, D50, calcium gluconate, Kayexalate.  Patient was informed of these findings and repeat potassium will be drawn after treatment.  He was admitted to the stepdown unit for further management of his metabolic abnormalities and possible sepsis with unknown origin at this time.    This patient required critical care. Due to the fact that the patient required a significant amount of one on one physician - patient contact time, ordering and review of studies, arranging urgent treatment with development of a management plan, evaluation of patient’s response to treatment with frequent reassessments, and discussions with other providers this patient required critical care time in excess of 30 minutes.  Critical care time was indicated due to the inherent instability and/or potential for instability in this patient.  The critical care time that is allocated to this patient is above and beyond any  time spent on any other billable procedures performed on this patient.      Procedures   Procedures    Diagnosis     1. Hyperkalemia    2. Sepsis, due to unspecified organism, unspecified whether acute organ dysfunction present (CMS/[facility])    3. Nausea and vomiting, unspecified vomiting type    4. FABRICIO (acute kidney injury) (CMS/[facility])        Disposition     Admitted to hospitalist team, discussed differential and findings with patient as well as any family members at bedside.      ED Prescriptions    None            Gustavo Webb DO  11/23/23 1910

## 2023-11-24 LAB
ANION GAP SERPL CALC-SCNC: 14 MMOL/L (ref 10–20)
BUN SERPL-MCNC: 19 MG/DL (ref 6–23)
CALCIUM SERPL-MCNC: 9.1 MG/DL (ref 8.6–10.3)
CHLORIDE SERPL-SCNC: 103 MMOL/L (ref 98–107)
CO2 SERPL-SCNC: 22 MMOL/L (ref 21–32)
CREAT SERPL-MCNC: 1.85 MG/DL (ref 0.5–1.3)
GFR SERPL CREATININE-BSD FRML MDRD: 49 ML/MIN/1.73M*2
GLUCOSE SERPL-MCNC: 84 MG/DL (ref 74–99)
HOLD SPECIMEN: NORMAL
POTASSIUM SERPL-SCNC: 3.9 MMOL/L (ref 3.5–5.3)
SODIUM SERPL-SCNC: 135 MMOL/L (ref 136–145)

## 2023-11-24 PROCEDURE — 36415 COLL VENOUS BLD VENIPUNCTURE: CPT | Performed by: INTERNAL MEDICINE

## 2023-11-24 PROCEDURE — 2060000001 HC INTERMEDIATE ICU ROOM DAILY

## 2023-11-24 PROCEDURE — 80048 BASIC METABOLIC PNL TOTAL CA: CPT | Performed by: INTERNAL MEDICINE

## 2023-11-24 PROCEDURE — 2500000004 HC RX 250 GENERAL PHARMACY W/ HCPCS (ALT 636 FOR OP/ED): Performed by: INTERNAL MEDICINE

## 2023-11-24 RX ADMIN — DEXTROSE MONOHYDRATE AND SODIUM CHLORIDE 200 ML/HR: 5; .9 INJECTION, SOLUTION INTRAVENOUS at 08:14

## 2023-11-24 RX ADMIN — DEXTROSE MONOHYDRATE AND SODIUM CHLORIDE 200 ML/HR: 5; .9 INJECTION, SOLUTION INTRAVENOUS at 18:45

## 2023-11-24 RX ADMIN — DEXTROSE MONOHYDRATE AND SODIUM CHLORIDE 200 ML/HR: 5; .9 INJECTION, SOLUTION INTRAVENOUS at 23:47

## 2023-11-24 RX ADMIN — DEXTROSE MONOHYDRATE AND SODIUM CHLORIDE 200 ML/HR: 5; .9 INJECTION, SOLUTION INTRAVENOUS at 03:14

## 2023-11-24 RX ADMIN — DEXTROSE MONOHYDRATE AND SODIUM CHLORIDE 200 ML/HR: 5; .9 INJECTION, SOLUTION INTRAVENOUS at 13:51

## 2023-11-24 ASSESSMENT — COGNITIVE AND FUNCTIONAL STATUS - GENERAL
MOBILITY SCORE: 24
DAILY ACTIVITIY SCORE: 24
DAILY ACTIVITIY SCORE: 24
MOBILITY SCORE: 24

## 2023-11-24 ASSESSMENT — ENCOUNTER SYMPTOMS
NEUROLOGICAL NEGATIVE: 1
NAUSEA: 1
VOMITING: 1
CONSTITUTIONAL NEGATIVE: 1
CARDIOVASCULAR NEGATIVE: 1
DIARRHEA: 1
RESPIRATORY NEGATIVE: 1
MUSCULOSKELETAL NEGATIVE: 1

## 2023-11-24 ASSESSMENT — PAIN SCALES - GENERAL
PAINLEVEL_OUTOF10: 0 - NO PAIN
PAINLEVEL_OUTOF10: 0 - NO PAIN

## 2023-11-24 NOTE — PROGRESS NOTES
Attempted to call into patient's room with no answer. Attempted to call cell phone and get a busy signal. Patient from home.

## 2023-11-24 NOTE — H&P
History Of Present Illness  Ingrid Maurice is a 32 y.o. male presenting with multiple episodes of nausea and vomiting.  By the time patient came to the emergency room he has significant electrolyte derangements.  Patient is a 32-year-old male with past medical history for colostomy and has a ileostomy patch.  The history has been mentioned in the surgical history below.  Patient was admitted to the emergency room with 1 day history of multiple episodes of nausea and vomiting.  By time patient was in the emergency room he is creatinine was 3 and his sodium was 7.7.  Patient was given multiple medications to lower his potassium and he was aggressively treated with IV fluid.  Currently his creatinine is down to 1.85 and the potassium has been normalized.  Patient is fully awake and alert oriented x3 in no distress, appetite is good and his eating after he was started on a diet this morning.  No further nausea and vomiting since admission.     Past Medical History  He has a past medical history of Hemorrhage of anus and rectum.    Surgical History  He has a past surgical history that includes Other surgical history (10/14/2013); Abdominal adhesion surgery (10/14/2013); Other surgical history (10/14/2013); Other surgical history (10/14/2013); Exploratory laparotomy (10/14/2013); Other surgical history (10/14/2013); IR nephrostomy tube exchange (1/8/2013); IR GI tube exchange (1/8/2013); IR nephrostomy tube exchange (3/13/2013); IR GI tube exchange (3/13/2013); IR nephrostomy tube exchange (4/12/2013); IR GI tube exchange (4/12/2013); IR nephrostomy tube exchange (6/19/2013); IR GI tube exchange (6/19/2013); IR nephrostomy tube exchange (7/24/2013); IR GI tube exchange (7/24/2013); IR nephrostomy tube exchange (11/25/2013); IR GI tube exchange (11/25/2013); IR nephrostomy tube exchange (3/6/2014); IR GI tube exchange (3/6/2014); IR nephrostomy tube exchange (5/2/2014); IR GI tube exchange (5/2/2014); IR nephrostomy  tube exchange (6/9/2014); IR GI tube exchange (6/9/2014); IR nephrostomy tube exchange (7/14/2014); IR GI tube exchange (7/14/2014); IR nephrostomy tube exchange (8/18/2014); IR GI tube exchange (8/18/2014); IR nephrostomy tube exchange (10/7/2014); IR GI tube exchange (10/7/2014); IR nephrostomy tube exchange (11/30/2014); IR GI tube exchange (11/30/2014); IR nephrostomy tube exchange (12/30/2014); IR GI tube exchange (12/30/2014); US guided percutaneous peritoneal or retroperitoneal fluid collection drainage (1/2/2015); IR nephrostomy tube exchange (4/8/2015); IR GI tube exchange (4/8/2015); IR nephrostomy tube exchange (6/5/2015); IR GI tube exchange (6/5/2015); IR nephrostomy tube exchange (8/3/2015); IR GI tube exchange (8/3/2015); IR nephrostomy tube exchange (10/1/2015); IR nephrostomy tube exchange (11/25/2015); IR GI tube exchange (11/25/2015); IR nephrostomy tube exchange (12/28/2015); IR GI tube exchange (12/28/2015); IR nephrostomy tube exchange (1/28/2016); IR nephrostomy tube exchange (1/10/2018); IR nephrostomy tube exchange (4/30/2018); IR nephrostomy tube exchange (5/22/2018); IR nephrostomy tube exchange (1/31/2017); IR nephrostomy tube exchange (3/15/2017); IR nephrostomy tube exchange (4/19/2017); IR nephrostomy tube exchange (5/18/2017); IR nephrostomy tube exchange (6/29/2017); IR nephrostomy tube exchange (8/17/2017); IR nephrostomy tube exchange (10/13/2017); CT guided percutaneous peritoneal or retroperitoneal fluid collection drainage (7/3/2018); IR nephrostomy tube exchange (3/21/2012); IR GI tube exchange (3/21/2012); IR nephrostomy tube exchange (5/24/2012); IR GI tube exchange (5/24/2012); IR nephrostomy tube exchange (8/7/2012); IR GI tube exchange (8/7/2012); IR nephrostomy tube exchange (9/5/2012); IR GI tube exchange (9/5/2012); IR nephrostomy tube exchange (11/5/2012); and IR GI tube exchange (11/5/2012).     Social History  He reports that he has never smoked. He has never used  smokeless tobacco. No history on file for alcohol use and drug use.    Family History  No family history on file.     Allergies  Coconut and Hydrocodone-acetaminophen    Review of Systems   Constitutional: Negative.    HENT: Negative.     Respiratory: Negative.     Cardiovascular: Negative.    Gastrointestinal:  Positive for diarrhea, nausea and vomiting.   Musculoskeletal: Negative.    Skin: Negative.    Neurological: Negative.         Physical Exam  Constitutional:       Appearance: Normal appearance.   HENT:      Head: Normocephalic and atraumatic.      Nose: Nose normal.      Mouth/Throat:      Mouth: Mucous membranes are moist.      Pharynx: Oropharynx is clear.   Cardiovascular:      Rate and Rhythm: Normal rate.   Pulmonary:      Effort: Pulmonary effort is normal.      Breath sounds: Normal breath sounds.   Abdominal:      General: Abdomen is flat. Bowel sounds are normal.      Palpations: Abdomen is soft.   Musculoskeletal:         General: Normal range of motion.   Skin:     General: Skin is warm.   Neurological:      Mental Status: He is alert.          Last Recorded Vitals  /56   Pulse 77   Temp 37.1 °C (98.8 °F) (Temporal)   Resp 18   Wt 66 kg (145 lb 8.1 oz)   SpO2 97%     Relevant Results             Assessment/Plan   Principal Problem:    Hyperkalemia    #1 FABRICIO, continue with IV hydration, the creatinine has come down from 3-1.85    2.  Hyperkalemia, currently potassium is normal    3.  Nausea and vomiting, this has resolved and patient has no further nausea and vomiting    Continue with IV hydration for the time being repeat labs in a.m. and most likely patient should be able to be discharged home tomorrow       Peewee Harris MD

## 2023-11-24 NOTE — CARE PLAN
The patient's goals for the shift include  advance diet    The clinical goals for the shift include Monitor for vomiting and maintain NPO    Over the shift, the patient did not make progress toward the following goals. Barriers to progression include nausea. Recommendations to address these barriers include take in smaller amounts of liquids at one time.

## 2023-11-25 VITALS
RESPIRATION RATE: 18 BRPM | TEMPERATURE: 98.8 F | DIASTOLIC BLOOD PRESSURE: 88 MMHG | HEART RATE: 69 BPM | HEIGHT: 68 IN | WEIGHT: 145.5 LBS | OXYGEN SATURATION: 97 % | SYSTOLIC BLOOD PRESSURE: 134 MMHG | BODY MASS INDEX: 22.05 KG/M2

## 2023-11-25 LAB
ANION GAP SERPL CALC-SCNC: 9 MMOL/L (ref 10–20)
BUN SERPL-MCNC: 14 MG/DL (ref 6–23)
CALCIUM SERPL-MCNC: 9 MG/DL (ref 8.6–10.3)
CHLORIDE SERPL-SCNC: 110 MMOL/L (ref 98–107)
CO2 SERPL-SCNC: 22 MMOL/L (ref 21–32)
CREAT SERPL-MCNC: 1.28 MG/DL (ref 0.5–1.3)
GFR SERPL CREATININE-BSD FRML MDRD: 77 ML/MIN/1.73M*2
GLUCOSE SERPL-MCNC: 80 MG/DL (ref 74–99)
POTASSIUM SERPL-SCNC: 4.4 MMOL/L (ref 3.5–5.3)
SODIUM SERPL-SCNC: 137 MMOL/L (ref 136–145)

## 2023-11-25 PROCEDURE — 80048 BASIC METABOLIC PNL TOTAL CA: CPT | Performed by: INTERNAL MEDICINE

## 2023-11-25 PROCEDURE — 2500000004 HC RX 250 GENERAL PHARMACY W/ HCPCS (ALT 636 FOR OP/ED): Performed by: INTERNAL MEDICINE

## 2023-11-25 PROCEDURE — 36415 COLL VENOUS BLD VENIPUNCTURE: CPT | Performed by: INTERNAL MEDICINE

## 2023-11-25 RX ADMIN — DEXTROSE MONOHYDRATE AND SODIUM CHLORIDE 200 ML/HR: 5; .9 INJECTION, SOLUTION INTRAVENOUS at 08:34

## 2023-11-25 RX ADMIN — DEXTROSE MONOHYDRATE AND SODIUM CHLORIDE 200 ML/HR: 5; .9 INJECTION, SOLUTION INTRAVENOUS at 04:13

## 2023-11-25 ASSESSMENT — COGNITIVE AND FUNCTIONAL STATUS - GENERAL
MOBILITY SCORE: 24
DAILY ACTIVITIY SCORE: 24

## 2023-11-25 ASSESSMENT — PAIN - FUNCTIONAL ASSESSMENT: PAIN_FUNCTIONAL_ASSESSMENT: 0-10

## 2023-11-25 ASSESSMENT — PAIN SCALES - GENERAL: PAINLEVEL_OUTOF10: 0 - NO PAIN

## 2023-11-25 NOTE — DISCHARGE SUMMARY
Discharge Diagnosis  Hyperkalemia  FABRICIO    Issues Requiring Follow-Up  To see a nephrologist   Dr Wyman regarding his kidney function    Discharge Meds     Your medication list      You have not been prescribed any medications.         Test Results Pending At Discharge  Pending Labs       Order Current Status    Blood Culture Preliminary result    Blood Culture Preliminary result            Hospital Course   Ingrid Maurice is a 32 y.o. male presenting with multiple episodes of nausea and vomiting.  By the time patient came to the emergency room he has significant electrolyte derangements.  Patient is a 32-year-old male with past medical history for colostomy and has a ileostomy patch.  The history has been mentioned in the surgical history below.  Patient was admitted to the emergency room with 1 day history of multiple episodes of nausea and vomiting.  By time patient was in the emergency room he is creatinine was 3 and his K+ was 7.7.  Patient was given multiple medications to lower his potassium and he was aggressively treated with IV fluid.  Currently his creatinine is down to 1.85 and the potassium has been normalized.  Patient is fully awake and alert oriented x3 in no distress, appetite is good and his eating after he was started on a diet this morning.  No further nausea and vomiting since admission.  Patient was aggressively hydrated with IV fluid, kept n.p.o. the first evening and started on a diet the next day after we made sure that patient does not have any further episodes of nausea and vomiting.  His creatinine has come down to 1.2 today.  Patient is a stable enough to be discharged home as he is tolerating diet well.  Patient was recommended to see a nephrologist on consult regarding his general kidney function as he had the right-sided nephrectomy.    Pertinent Physical Exam At Time of Discharge  Physical Exam  Patient is fully awake and alert oriented x3 no distress    Head and neck within normal  limits    Lungs bilateral clear auscultation    Heart regular S1-S2    Abdomen soft and nontender, he has a ileostomy bag in the right side of the abdomen    Extremities no edema  Outpatient Follow-Up  Patient can contact me if any further issues.  Please provide the patient with phone number to Dr. Wyman's office.    Peewee Harris MD

## 2023-11-25 NOTE — CARE PLAN
The patient's goals for the shift include monitor for n/v    The clinical goals for the shift include monitor for n/v

## 2023-11-25 NOTE — CARE PLAN
Problem: Pain  Goal: My pain/discomfort is manageable  Outcome: Progressing     Problem: Safety  Goal: Patient will be injury free during hospitalization  Outcome: Progressing  Goal: I will remain free of falls  Outcome: Progressing     Problem: Daily Care  Goal: Daily care needs are met  Outcome: Progressing     Problem: Discharge Barriers  Goal: My discharge needs are met  Outcome: Progressing     Problem: Pain - Adult  Goal: Verbalizes/displays adequate comfort level or baseline comfort level  Outcome: Progressing   The patient's goals for the shift include  rest and comfort    The clinical goals for the shift include monitor for pain, nausea and vomiting throughout shift,    Over the shift, the patient did not make progress toward the following goals. Barriers to progression include hyperkalemia. Recommendations to address these barriers include daily labs to monitor potassium levels.

## 2023-11-28 LAB
BACTERIA BLD CULT: NORMAL
BACTERIA BLD CULT: NORMAL

## 2023-12-18 ENCOUNTER — HOSPITAL ENCOUNTER (EMERGENCY)
Facility: HOSPITAL | Age: 33
Discharge: LEFT BEFORE DISCHARGE | End: 2023-12-18
Payer: COMMERCIAL

## 2023-12-18 VITALS
BODY MASS INDEX: 18.83 KG/M2 | OXYGEN SATURATION: 99 % | TEMPERATURE: 97.7 F | WEIGHT: 120 LBS | RESPIRATION RATE: 20 BRPM | HEART RATE: 121 BPM | SYSTOLIC BLOOD PRESSURE: 129 MMHG | DIASTOLIC BLOOD PRESSURE: 92 MMHG | HEIGHT: 67 IN

## 2023-12-18 PROCEDURE — 99281 EMR DPT VST MAYX REQ PHY/QHP: CPT

## 2023-12-18 ASSESSMENT — COLUMBIA-SUICIDE SEVERITY RATING SCALE - C-SSRS
6. HAVE YOU EVER DONE ANYTHING, STARTED TO DO ANYTHING, OR PREPARED TO DO ANYTHING TO END YOUR LIFE?: NO
1. IN THE PAST MONTH, HAVE YOU WISHED YOU WERE DEAD OR WISHED YOU COULD GO TO SLEEP AND NOT WAKE UP?: NO
2. HAVE YOU ACTUALLY HAD ANY THOUGHTS OF KILLING YOURSELF?: NO

## 2023-12-18 NOTE — ED TRIAGE NOTES
Decreased output in his colostomy for the last few days. Pt states that he has had vomiting as well.

## 2023-12-18 NOTE — ED TRIAGE NOTES
The patient was seen and examined in triage.    History of Present Illness: The patient is a 33-year-old male who presents emergency department due to abdominal pain and vomiting that started yesterday.  He reports that 2 days ago he had liquid output from his ostomy and started developing abdominal pain.  Yesterday he had decreased ostomy output and started having nausea and vomiting.  He is reporting significant pain to the abdomen.  He has not had fever or chills.  He reports that he is very dehydrated and does not feel well.    Brief Physical Exam:  Exam is limited by the patient sitting in a chair in triage.   Heart: Regular rate and rhythm.   Lungs: Clear to auscultation bilaterally.   Abdomen: Soft, nondistended.  Diffuse tenderness.  Ostomy site intact with small amount of liquid yellow/green stool.    Plan: Appropriate labs and diagnostic imaging were ordered.      For the remainder of the patient's workup and ED course, please refer to the main ED provider note. We discussed need for diagnostic testing including laboratory studies and imaging.  We also discussed that they may be asked to wait in the waiting room while these tests are pending.  They understand that if they choose to leave without having the testing completed or resulted that we cannot rule out acute life threatening illnesses and the risks involved could lead to worsening condition, permanent disability or even death.      Disclaimer: This note was dictated by speech recognition. Minor errors in transcription may be present. Please call if questions.

## 2023-12-30 LAB
ATRIAL RATE: 99 BPM
P AXIS: 83 DEGREES
P OFFSET: 203 MS
P ONSET: 154 MS
PR INTERVAL: 138 MS
Q ONSET: 223 MS
QRS COUNT: 16 BEATS
QRS DURATION: 78 MS
QT INTERVAL: 312 MS
QTC CALCULATION(BAZETT): 400 MS
QTC FREDERICIA: 368 MS
R AXIS: 79 DEGREES
T AXIS: 75 DEGREES
T OFFSET: 379 MS
VENTRICULAR RATE: 99 BPM

## 2024-02-02 ENCOUNTER — HOSPITAL ENCOUNTER (OUTPATIENT)
Dept: RESEARCH | Facility: HOSPITAL | Age: 34
Discharge: HOME | End: 2024-02-02
Payer: COMMERCIAL

## 2024-02-02 ENCOUNTER — LAB (OUTPATIENT)
Dept: LAB | Facility: LAB | Age: 34
End: 2024-02-02
Payer: COMMERCIAL

## 2024-02-02 DIAGNOSIS — Z00.6 RESEARCH STUDY PATIENT: Primary | ICD-10-CM

## 2024-02-02 LAB
25(OH)D3 SERPL-MCNC: 10 NG/ML (ref 30–100)
APPEARANCE UR: CLEAR
BASOPHILS # BLD AUTO: 0.07 X10*3/UL (ref 0–0.1)
BASOPHILS NFR BLD AUTO: 0.8 %
BILIRUB UR STRIP.AUTO-MCNC: NEGATIVE MG/DL
COLOR UR: YELLOW
EOSINOPHIL # BLD AUTO: 0.19 X10*3/UL (ref 0–0.7)
EOSINOPHIL NFR BLD AUTO: 2.3 %
ERYTHROCYTE [DISTWIDTH] IN BLOOD BY AUTOMATED COUNT: 13.9 % (ref 11.5–14.5)
GLUCOSE UR STRIP.AUTO-MCNC: NEGATIVE MG/DL
HCT VFR BLD AUTO: 39.9 % (ref 41–52)
HGB BLD-MCNC: 14.4 G/DL (ref 13.5–17.5)
IMM GRANULOCYTES # BLD AUTO: 0.01 X10*3/UL (ref 0–0.7)
IMM GRANULOCYTES NFR BLD AUTO: 0.1 % (ref 0–0.9)
KETONES UR STRIP.AUTO-MCNC: NEGATIVE MG/DL
LEUKOCYTE ESTERASE UR QL STRIP.AUTO: ABNORMAL
LYMPHOCYTES # BLD AUTO: 3.06 X10*3/UL (ref 1.2–4.8)
LYMPHOCYTES NFR BLD AUTO: 36.3 %
MCH RBC QN AUTO: 29.4 PG (ref 26–34)
MCHC RBC AUTO-ENTMCNC: 36.1 G/DL (ref 32–36)
MCV RBC AUTO: 82 FL (ref 80–100)
MONOCYTES # BLD AUTO: 0.76 X10*3/UL (ref 0.1–1)
MONOCYTES NFR BLD AUTO: 9 %
MUCOUS THREADS #/AREA URNS AUTO: ABNORMAL /LPF
NEUTROPHILS # BLD AUTO: 4.35 X10*3/UL (ref 1.2–7.7)
NEUTROPHILS NFR BLD AUTO: 51.5 %
NITRITE UR QL STRIP.AUTO: NEGATIVE
NRBC BLD-RTO: 0 /100 WBCS (ref 0–0)
PH UR STRIP.AUTO: 5 [PH]
PLATELET # BLD AUTO: 281 X10*3/UL (ref 150–450)
PROT UR STRIP.AUTO-MCNC: NEGATIVE MG/DL
RBC # BLD AUTO: 4.89 X10*6/UL (ref 4.5–5.9)
RBC # UR STRIP.AUTO: ABNORMAL /UL
RBC #/AREA URNS AUTO: ABNORMAL /HPF
SP GR UR STRIP.AUTO: 1.02
UROBILINOGEN UR STRIP.AUTO-MCNC: <2 MG/DL
WBC # BLD AUTO: 8.4 X10*3/UL (ref 4.4–11.3)
WBC #/AREA URNS AUTO: ABNORMAL /HPF

## 2024-02-02 PROCEDURE — 81001 URINALYSIS AUTO W/SCOPE: CPT

## 2024-02-02 PROCEDURE — 82306 VITAMIN D 25 HYDROXY: CPT

## 2024-02-02 PROCEDURE — 36415 COLL VENOUS BLD VENIPUNCTURE: CPT

## 2024-02-02 PROCEDURE — 82610 CYSTATIN C: CPT

## 2024-02-02 PROCEDURE — 80053 COMPREHEN METABOLIC PANEL: CPT

## 2024-02-02 PROCEDURE — 85025 COMPLETE CBC W/AUTO DIFF WBC: CPT

## 2024-02-03 LAB
CYSTATIN C SERPL-MCNC: 1.2 MG/L (ref 0.5–1.2)
GFR/BSA.PRED SERPLBLD CYS-BASED-ARV: 68 ML/MIN/BSA

## 2024-02-05 LAB
ALBUMIN SERPL BCP-MCNC: 4.2 G/DL (ref 3.4–5)
ALP SERPL-CCNC: 61 U/L (ref 33–120)
ALT SERPL W P-5'-P-CCNC: 13 U/L (ref 10–52)
ANION GAP SERPL CALC-SCNC: 12 MMOL/L (ref 10–20)
AST SERPL W P-5'-P-CCNC: 15 U/L (ref 9–39)
BILIRUB SERPL-MCNC: 0.7 MG/DL (ref 0–1.2)
BUN SERPL-MCNC: 9 MG/DL (ref 6–23)
CALCIUM SERPL-MCNC: 9.7 MG/DL (ref 8.6–10.6)
CHLORIDE SERPL-SCNC: 106 MMOL/L (ref 98–107)
CO2 SERPL-SCNC: 25 MMOL/L (ref 21–32)
CREAT SERPL-MCNC: 1.33 MG/DL (ref 0.5–1.3)
EGFRCR SERPLBLD CKD-EPI 2021: 73 ML/MIN/1.73M*2
GLUCOSE SERPL-MCNC: 79 MG/DL (ref 74–99)
POTASSIUM SERPL-SCNC: 4.6 MMOL/L (ref 3.5–5.3)
PROT SERPL-MCNC: 7.2 G/DL (ref 6.4–8.2)
SODIUM SERPL-SCNC: 138 MMOL/L (ref 136–145)

## 2024-05-26 ENCOUNTER — APPOINTMENT (OUTPATIENT)
Dept: CARDIOLOGY | Facility: HOSPITAL | Age: 34
End: 2024-05-26
Payer: COMMERCIAL

## 2024-05-26 ENCOUNTER — HOSPITAL ENCOUNTER (INPATIENT)
Facility: HOSPITAL | Age: 34
LOS: 1 days | Discharge: HOME | End: 2024-05-28
Attending: EMERGENCY MEDICINE | Admitting: INTERNAL MEDICINE
Payer: COMMERCIAL

## 2024-05-26 ENCOUNTER — APPOINTMENT (OUTPATIENT)
Dept: RADIOLOGY | Facility: HOSPITAL | Age: 34
End: 2024-05-26
Payer: COMMERCIAL

## 2024-05-26 DIAGNOSIS — N12 PYELONEPHRITIS: Primary | ICD-10-CM

## 2024-05-26 PROBLEM — R07.9 CHEST PAIN: Status: ACTIVE | Noted: 2024-05-26

## 2024-05-26 LAB
ALBUMIN SERPL BCP-MCNC: 4 G/DL (ref 3.4–5)
ALP SERPL-CCNC: 56 U/L (ref 33–120)
ALT SERPL W P-5'-P-CCNC: 18 U/L (ref 10–52)
ANION GAP SERPL CALC-SCNC: 11 MMOL/L (ref 10–20)
APPEARANCE UR: CLEAR
AST SERPL W P-5'-P-CCNC: 21 U/L (ref 9–39)
BASOPHILS # BLD AUTO: 0.05 X10*3/UL (ref 0–0.1)
BASOPHILS NFR BLD AUTO: 0.3 %
BILIRUB SERPL-MCNC: 0.6 MG/DL (ref 0–1.2)
BILIRUB UR STRIP.AUTO-MCNC: NEGATIVE MG/DL
BUN SERPL-MCNC: 5 MG/DL (ref 6–23)
CALCIUM SERPL-MCNC: 9 MG/DL (ref 8.6–10.3)
CHLORIDE SERPL-SCNC: 105 MMOL/L (ref 98–107)
CO2 SERPL-SCNC: 23 MMOL/L (ref 21–32)
COLOR UR: YELLOW
CREAT SERPL-MCNC: 1.23 MG/DL (ref 0.5–1.3)
EGFRCR SERPLBLD CKD-EPI 2021: 80 ML/MIN/1.73M*2
EOSINOPHIL # BLD AUTO: 0.03 X10*3/UL (ref 0–0.7)
EOSINOPHIL NFR BLD AUTO: 0.2 %
ERYTHROCYTE [DISTWIDTH] IN BLOOD BY AUTOMATED COUNT: 13.2 % (ref 11.5–14.5)
GLUCOSE SERPL-MCNC: 94 MG/DL (ref 74–99)
GLUCOSE UR STRIP.AUTO-MCNC: NORMAL MG/DL
HCT VFR BLD AUTO: 36.2 % (ref 41–52)
HGB BLD-MCNC: 13.5 G/DL (ref 13.5–17.5)
HOLD SPECIMEN: NORMAL
IMM GRANULOCYTES # BLD AUTO: 0.06 X10*3/UL (ref 0–0.7)
IMM GRANULOCYTES NFR BLD AUTO: 0.4 % (ref 0–0.9)
KETONES UR STRIP.AUTO-MCNC: NEGATIVE MG/DL
LACTATE SERPL-SCNC: 1.1 MMOL/L (ref 0.4–2)
LEUKOCYTE ESTERASE UR QL STRIP.AUTO: ABNORMAL
LIPASE SERPL-CCNC: 30 U/L (ref 9–82)
LYMPHOCYTES # BLD AUTO: 0.81 X10*3/UL (ref 1.2–4.8)
LYMPHOCYTES NFR BLD AUTO: 5.3 %
MAGNESIUM SERPL-MCNC: 1.64 MG/DL (ref 1.6–2.4)
MCH RBC QN AUTO: 29.7 PG (ref 26–34)
MCHC RBC AUTO-ENTMCNC: 37.3 G/DL (ref 32–36)
MCV RBC AUTO: 80 FL (ref 80–100)
MONOCYTES # BLD AUTO: 0.86 X10*3/UL (ref 0.1–1)
MONOCYTES NFR BLD AUTO: 5.6 %
MUCOUS THREADS #/AREA URNS AUTO: ABNORMAL /LPF
NEUTROPHILS # BLD AUTO: 13.56 X10*3/UL (ref 1.2–7.7)
NEUTROPHILS NFR BLD AUTO: 88.2 %
NITRITE UR QL STRIP.AUTO: NEGATIVE
NRBC BLD-RTO: 0 /100 WBCS (ref 0–0)
PH UR STRIP.AUTO: 6 [PH]
PLATELET # BLD AUTO: 212 X10*3/UL (ref 150–450)
POTASSIUM SERPL-SCNC: 3.4 MMOL/L (ref 3.5–5.3)
PROT SERPL-MCNC: 6.5 G/DL (ref 6.4–8.2)
PROT UR STRIP.AUTO-MCNC: ABNORMAL MG/DL
RBC # BLD AUTO: 4.54 X10*6/UL (ref 4.5–5.9)
RBC # UR STRIP.AUTO: ABNORMAL /UL
RBC #/AREA URNS AUTO: ABNORMAL /HPF
SODIUM SERPL-SCNC: 136 MMOL/L (ref 136–145)
SP GR UR STRIP.AUTO: 1.02
UROBILINOGEN UR STRIP.AUTO-MCNC: NORMAL MG/DL
WBC # BLD AUTO: 15.4 X10*3/UL (ref 4.4–11.3)
WBC #/AREA URNS AUTO: ABNORMAL /HPF
WBC CLUMPS #/AREA URNS AUTO: ABNORMAL /HPF

## 2024-05-26 PROCEDURE — 2500000004 HC RX 250 GENERAL PHARMACY W/ HCPCS (ALT 636 FOR OP/ED): Performed by: EMERGENCY MEDICINE

## 2024-05-26 PROCEDURE — 2500000004 HC RX 250 GENERAL PHARMACY W/ HCPCS (ALT 636 FOR OP/ED): Performed by: STUDENT IN AN ORGANIZED HEALTH CARE EDUCATION/TRAINING PROGRAM

## 2024-05-26 PROCEDURE — 96361 HYDRATE IV INFUSION ADD-ON: CPT

## 2024-05-26 PROCEDURE — 51798 US URINE CAPACITY MEASURE: CPT

## 2024-05-26 PROCEDURE — 85025 COMPLETE CBC W/AUTO DIFF WBC: CPT | Performed by: EMERGENCY MEDICINE

## 2024-05-26 PROCEDURE — 83735 ASSAY OF MAGNESIUM: CPT

## 2024-05-26 PROCEDURE — 2500000004 HC RX 250 GENERAL PHARMACY W/ HCPCS (ALT 636 FOR OP/ED)

## 2024-05-26 PROCEDURE — 83605 ASSAY OF LACTIC ACID: CPT | Performed by: EMERGENCY MEDICINE

## 2024-05-26 PROCEDURE — 93005 ELECTROCARDIOGRAM TRACING: CPT

## 2024-05-26 PROCEDURE — 36415 COLL VENOUS BLD VENIPUNCTURE: CPT | Performed by: EMERGENCY MEDICINE

## 2024-05-26 PROCEDURE — 74177 CT ABD & PELVIS W/CONTRAST: CPT | Performed by: RADIOLOGY

## 2024-05-26 PROCEDURE — 74177 CT ABD & PELVIS W/CONTRAST: CPT

## 2024-05-26 PROCEDURE — G0378 HOSPITAL OBSERVATION PER HR: HCPCS

## 2024-05-26 PROCEDURE — 80053 COMPREHEN METABOLIC PANEL: CPT | Performed by: EMERGENCY MEDICINE

## 2024-05-26 PROCEDURE — 96366 THER/PROPH/DIAG IV INF ADDON: CPT

## 2024-05-26 PROCEDURE — 96365 THER/PROPH/DIAG IV INF INIT: CPT

## 2024-05-26 PROCEDURE — 87086 URINE CULTURE/COLONY COUNT: CPT | Mod: PARLAB | Performed by: EMERGENCY MEDICINE

## 2024-05-26 PROCEDURE — 87491 CHLMYD TRACH DNA AMP PROBE: CPT | Mod: PARLAB

## 2024-05-26 PROCEDURE — 96375 TX/PRO/DX INJ NEW DRUG ADDON: CPT

## 2024-05-26 PROCEDURE — 87040 BLOOD CULTURE FOR BACTERIA: CPT | Mod: PARLAB | Performed by: EMERGENCY MEDICINE

## 2024-05-26 PROCEDURE — 2500000001 HC RX 250 WO HCPCS SELF ADMINISTERED DRUGS (ALT 637 FOR MEDICARE OP)

## 2024-05-26 PROCEDURE — 87591 N.GONORRHOEAE DNA AMP PROB: CPT | Mod: PARLAB

## 2024-05-26 PROCEDURE — 96376 TX/PRO/DX INJ SAME DRUG ADON: CPT

## 2024-05-26 PROCEDURE — 83690 ASSAY OF LIPASE: CPT | Performed by: EMERGENCY MEDICINE

## 2024-05-26 PROCEDURE — 2500000002 HC RX 250 W HCPCS SELF ADMINISTERED DRUGS (ALT 637 FOR MEDICARE OP, ALT 636 FOR OP/ED)

## 2024-05-26 PROCEDURE — 81001 URINALYSIS AUTO W/SCOPE: CPT | Performed by: EMERGENCY MEDICINE

## 2024-05-26 PROCEDURE — 99285 EMERGENCY DEPT VISIT HI MDM: CPT | Mod: 25

## 2024-05-26 PROCEDURE — 2550000001 HC RX 255 CONTRASTS: Performed by: EMERGENCY MEDICINE

## 2024-05-26 RX ORDER — SODIUM CHLORIDE 9 MG/ML
100 INJECTION, SOLUTION INTRAVENOUS CONTINUOUS
Status: ACTIVE | OUTPATIENT
Start: 2024-05-26 | End: 2024-05-26

## 2024-05-26 RX ORDER — CEFTRIAXONE 1 G/50ML
1 INJECTION, SOLUTION INTRAVENOUS EVERY 24 HOURS
Status: DISCONTINUED | OUTPATIENT
Start: 2024-05-27 | End: 2024-05-26

## 2024-05-26 RX ORDER — L. ACIDOPHILUS/L.BULGARICUS 1MM CELL
1 TABLET ORAL 2 TIMES DAILY
Status: DISCONTINUED | OUTPATIENT
Start: 2024-05-26 | End: 2024-05-28 | Stop reason: HOSPADM

## 2024-05-26 RX ORDER — ONDANSETRON HYDROCHLORIDE 2 MG/ML
4 INJECTION, SOLUTION INTRAVENOUS ONCE
Status: COMPLETED | OUTPATIENT
Start: 2024-05-26 | End: 2024-05-26

## 2024-05-26 RX ORDER — ONDANSETRON 4 MG/1
4 TABLET, FILM COATED ORAL EVERY 8 HOURS PRN
Status: DISCONTINUED | OUTPATIENT
Start: 2024-05-26 | End: 2024-05-28 | Stop reason: HOSPADM

## 2024-05-26 RX ORDER — ONDANSETRON HYDROCHLORIDE 2 MG/ML
4 INJECTION, SOLUTION INTRAVENOUS EVERY 8 HOURS PRN
Status: DISCONTINUED | OUTPATIENT
Start: 2024-05-26 | End: 2024-05-28 | Stop reason: HOSPADM

## 2024-05-26 RX ORDER — ACETAMINOPHEN 325 MG/1
650 TABLET ORAL EVERY 4 HOURS PRN
Status: DISCONTINUED | OUTPATIENT
Start: 2024-05-26 | End: 2024-05-28 | Stop reason: HOSPADM

## 2024-05-26 RX ORDER — MORPHINE SULFATE 4 MG/ML
4 INJECTION, SOLUTION INTRAMUSCULAR; INTRAVENOUS ONCE
Status: COMPLETED | OUTPATIENT
Start: 2024-05-26 | End: 2024-05-26

## 2024-05-26 RX ORDER — CEFTRIAXONE 2 G/50ML
2 INJECTION, SOLUTION INTRAVENOUS ONCE
Status: COMPLETED | OUTPATIENT
Start: 2024-05-26 | End: 2024-05-26

## 2024-05-26 RX ORDER — MORPHINE SULFATE 2 MG/ML
2 INJECTION, SOLUTION INTRAMUSCULAR; INTRAVENOUS EVERY 4 HOURS PRN
Status: DISCONTINUED | OUTPATIENT
Start: 2024-05-26 | End: 2024-05-28 | Stop reason: HOSPADM

## 2024-05-26 RX ORDER — POTASSIUM CHLORIDE 20 MEQ/1
40 TABLET, EXTENDED RELEASE ORAL ONCE
Status: COMPLETED | OUTPATIENT
Start: 2024-05-26 | End: 2024-05-26

## 2024-05-26 RX ADMIN — SODIUM CHLORIDE 1000 ML: 9 INJECTION, SOLUTION INTRAVENOUS at 06:37

## 2024-05-26 RX ADMIN — MORPHINE SULFATE 2 MG: 2 INJECTION, SOLUTION INTRAMUSCULAR; INTRAVENOUS at 20:44

## 2024-05-26 RX ADMIN — MORPHINE SULFATE 4 MG: 4 INJECTION, SOLUTION INTRAMUSCULAR; INTRAVENOUS at 08:19

## 2024-05-26 RX ADMIN — SODIUM CHLORIDE 1000 ML: 9 INJECTION, SOLUTION INTRAVENOUS at 04:40

## 2024-05-26 RX ADMIN — MORPHINE SULFATE 2 MG: 2 INJECTION, SOLUTION INTRAMUSCULAR; INTRAVENOUS at 14:12

## 2024-05-26 RX ADMIN — SODIUM CHLORIDE 100 ML/HR: 9 INJECTION, SOLUTION INTRAVENOUS at 10:40

## 2024-05-26 RX ADMIN — Medication 1 TABLET: at 20:11

## 2024-05-26 RX ADMIN — MORPHINE SULFATE 4 MG: 4 INJECTION, SOLUTION INTRAMUSCULAR; INTRAVENOUS at 04:39

## 2024-05-26 RX ADMIN — IOHEXOL 75 ML: 350 INJECTION, SOLUTION INTRAVENOUS at 07:02

## 2024-05-26 RX ADMIN — Medication 1 TABLET: at 10:38

## 2024-05-26 RX ADMIN — CEFTRIAXONE SODIUM 2 G: 2 INJECTION, SOLUTION INTRAVENOUS at 06:37

## 2024-05-26 RX ADMIN — PIPERACILLIN SODIUM AND TAZOBACTAM SODIUM 3.38 G: 3; .375 INJECTION, SOLUTION INTRAVENOUS at 18:12

## 2024-05-26 RX ADMIN — ONDANSETRON 4 MG: 2 INJECTION INTRAMUSCULAR; INTRAVENOUS at 04:39

## 2024-05-26 RX ADMIN — POTASSIUM CHLORIDE 40 MEQ: 1500 TABLET, EXTENDED RELEASE ORAL at 10:38

## 2024-05-26 SDOH — SOCIAL STABILITY: SOCIAL INSECURITY: ARE YOU OR HAVE YOU BEEN THREATENED OR ABUSED PHYSICALLY, EMOTIONALLY, OR SEXUALLY BY ANYONE?: NO

## 2024-05-26 SDOH — SOCIAL STABILITY: SOCIAL INSECURITY: DO YOU FEEL ANYONE HAS EXPLOITED OR TAKEN ADVANTAGE OF YOU FINANCIALLY OR OF YOUR PERSONAL PROPERTY?: NO

## 2024-05-26 SDOH — SOCIAL STABILITY: SOCIAL INSECURITY: ABUSE: ADULT

## 2024-05-26 SDOH — SOCIAL STABILITY: SOCIAL INSECURITY: HAS ANYONE EVER THREATENED TO HURT YOUR FAMILY OR YOUR PETS?: NO

## 2024-05-26 SDOH — SOCIAL STABILITY: SOCIAL INSECURITY: DO YOU FEEL UNSAFE GOING BACK TO THE PLACE WHERE YOU ARE LIVING?: NO

## 2024-05-26 SDOH — SOCIAL STABILITY: SOCIAL INSECURITY: DOES ANYONE TRY TO KEEP YOU FROM HAVING/CONTACTING OTHER FRIENDS OR DOING THINGS OUTSIDE YOUR HOME?: NO

## 2024-05-26 SDOH — SOCIAL STABILITY: SOCIAL INSECURITY: HAVE YOU HAD ANY THOUGHTS OF HARMING ANYONE ELSE?: NO

## 2024-05-26 SDOH — SOCIAL STABILITY: SOCIAL INSECURITY: WERE YOU ABLE TO COMPLETE ALL THE BEHAVIORAL HEALTH SCREENINGS?: YES

## 2024-05-26 SDOH — SOCIAL STABILITY: SOCIAL INSECURITY: ARE THERE ANY APPARENT SIGNS OF INJURIES/BEHAVIORS THAT COULD BE RELATED TO ABUSE/NEGLECT?: NO

## 2024-05-26 SDOH — SOCIAL STABILITY: SOCIAL INSECURITY: HAVE YOU HAD THOUGHTS OF HARMING ANYONE ELSE?: NO

## 2024-05-26 ASSESSMENT — COGNITIVE AND FUNCTIONAL STATUS - GENERAL
DAILY ACTIVITIY SCORE: 24
PATIENT BASELINE BEDBOUND: NO
MOBILITY SCORE: 24
DAILY ACTIVITIY SCORE: 24
MOBILITY SCORE: 24

## 2024-05-26 ASSESSMENT — COLUMBIA-SUICIDE SEVERITY RATING SCALE - C-SSRS
1. IN THE PAST MONTH, HAVE YOU WISHED YOU WERE DEAD OR WISHED YOU COULD GO TO SLEEP AND NOT WAKE UP?: NO
2. HAVE YOU ACTUALLY HAD ANY THOUGHTS OF KILLING YOURSELF?: NO
6. HAVE YOU EVER DONE ANYTHING, STARTED TO DO ANYTHING, OR PREPARED TO DO ANYTHING TO END YOUR LIFE?: NO

## 2024-05-26 ASSESSMENT — PAIN - FUNCTIONAL ASSESSMENT
PAIN_FUNCTIONAL_ASSESSMENT: 0-10

## 2024-05-26 ASSESSMENT — PAIN DESCRIPTION - LOCATION
LOCATION: ABDOMEN

## 2024-05-26 ASSESSMENT — ACTIVITIES OF DAILY LIVING (ADL)
GROOMING: INDEPENDENT
DRESSING YOURSELF: INDEPENDENT
PATIENT'S MEMORY ADEQUATE TO SAFELY COMPLETE DAILY ACTIVITIES?: YES
HEARING - RIGHT EAR: FUNCTIONAL
FEEDING YOURSELF: INDEPENDENT
WALKS IN HOME: INDEPENDENT
BATHING: INDEPENDENT
LACK_OF_TRANSPORTATION: NO
HEARING - LEFT EAR: FUNCTIONAL
ADEQUATE_TO_COMPLETE_ADL: YES
TOILETING: INDEPENDENT
JUDGMENT_ADEQUATE_SAFELY_COMPLETE_DAILY_ACTIVITIES: YES

## 2024-05-26 ASSESSMENT — ENCOUNTER SYMPTOMS
DIARRHEA: 0
DIZZINESS: 0
SORE THROAT: 0
CONSTIPATION: 0
CHILLS: 1
WHEEZING: 1
FEVER: 1
VOMITING: 0
ABDOMINAL PAIN: 1
HEMATURIA: 0
ABDOMINAL DISTENTION: 0
DYSURIA: 0
SHORTNESS OF BREATH: 0
DIFFICULTY URINATING: 0
RHINORRHEA: 0
PALPITATIONS: 0
NAUSEA: 1
WEAKNESS: 0
COUGH: 0
BLOOD IN STOOL: 0
LIGHT-HEADEDNESS: 0
FREQUENCY: 0

## 2024-05-26 ASSESSMENT — PAIN SCALES - GENERAL
PAINLEVEL_OUTOF10: 4
PAINLEVEL_OUTOF10: 0 - NO PAIN
PAINLEVEL_OUTOF10: 5 - MODERATE PAIN
PAINLEVEL_OUTOF10: 6
PAINLEVEL_OUTOF10: 7
PAINLEVEL_OUTOF10: 4

## 2024-05-26 ASSESSMENT — LIFESTYLE VARIABLES
HOW OFTEN DO YOU HAVE A DRINK CONTAINING ALCOHOL: NEVER
AUDIT-C TOTAL SCORE: 0
HOW OFTEN DO YOU HAVE 6 OR MORE DRINKS ON ONE OCCASION: NEVER
EVER HAD A DRINK FIRST THING IN THE MORNING TO STEADY YOUR NERVES TO GET RID OF A HANGOVER: NO
HAVE PEOPLE ANNOYED YOU BY CRITICIZING YOUR DRINKING: NO
HOW MANY STANDARD DRINKS CONTAINING ALCOHOL DO YOU HAVE ON A TYPICAL DAY: PATIENT DOES NOT DRINK
HAVE YOU EVER FELT YOU SHOULD CUT DOWN ON YOUR DRINKING: NO
TOTAL SCORE: 0
EVER FELT BAD OR GUILTY ABOUT YOUR DRINKING: NO
AUDIT-C TOTAL SCORE: 0
SKIP TO QUESTIONS 9-10: 1

## 2024-05-26 ASSESSMENT — PAIN DESCRIPTION - PROGRESSION: CLINICAL_PROGRESSION: GRADUALLY IMPROVING

## 2024-05-26 ASSESSMENT — PATIENT HEALTH QUESTIONNAIRE - PHQ9
SUM OF ALL RESPONSES TO PHQ9 QUESTIONS 1 & 2: 0
1. LITTLE INTEREST OR PLEASURE IN DOING THINGS: NOT AT ALL
2. FEELING DOWN, DEPRESSED OR HOPELESS: NOT AT ALL

## 2024-05-26 ASSESSMENT — PAIN DESCRIPTION - ORIENTATION: ORIENTATION: MID

## 2024-05-26 ASSESSMENT — PAIN DESCRIPTION - PAIN TYPE: TYPE: ACUTE PAIN

## 2024-05-26 NOTE — ED PROVIDER NOTES
Patient presenting for fever with reported abdominal pain. Generalized in location. Patient is s/p colectomy with J pouch in setting of family hx of FAP and R nephrectomy. Had a temp of 100.8, noted leukocytosis. Patient pending CT results, noted pyuria, concerning for pyelo. CT pending. Will need abx.    Patient received 2 g of A Rocephin.  CT otherwise negative.  Patient had blood cultures performed.  Patient reassessed given history of previous nephrectomy with only 1 kidney and sepsis criteria discussed admission for IV antibiotics and monitoring which patient has preference for.     Argenis Jules MD  05/26/24 0674

## 2024-05-26 NOTE — CARE PLAN
The patient's goals for the shift include      Problem: Pain  Goal: My pain/discomfort is manageable  Outcome: Progressing  Flowsheets (Taken 5/26/2024 1634)  Resident's pain/discomfort is manageable:   Offer non-pharmacological pain management interventions   Include resident/family/caregiver in decisions related to pain management     Problem: Skin  Goal: Prevent/minimize sheer/friction injuries  Outcome: Progressing  Flowsheets (Taken 5/26/2024 1634)  Prevent/minimize sheer/friction injuries:   HOB 30 degrees or less   Turn/reposition every 2 hours/use positioning/transfer devices     The clinical goals for the shift include pt will remain hemodynamically stable throughout shift

## 2024-05-26 NOTE — PROGRESS NOTES
Pharmacy Medication History Review    Ingrid Maurice is a 33 y.o. male admitted for Chest pain. Pharmacy reviewed the patient's [id number] medications and allergies for accuracy.    The list below reflectives the updated PTA list. Please review each medication in order reconciliation for additional clarification and justification.  Prior to Admission medications    No Current Medications        The list below reflectives the updated allergy list. Please review each documented allergy for additional clarification and justification.  Allergies  Reviewed by Rosio Hernandez RN on 5/26/2024        Severity Reactions Comments    Coconut High Anaphylaxis, Swelling Throat Swelling    Hydrocodone-acetaminophen Not Specified Other, Hives tolerated 09/12 without reaction            Below are additional concerns with the patient's PTA list.    Patient interested in receiving any prescriptions from [facility] [place].    Maris Case

## 2024-05-26 NOTE — CONSULTS
Reason For Consult  UTI    History Of Present Illness  Ingrid Maurice is a 33 y.o. 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 He had neph tubes for some time but ultimately ended up having a right nephrectomy about 4-5 yrs ago. [place] [facility] [place], then lost to follow-up. He said this is the first UTI he has had in over a year.  History of ESBL E. coli resistant to E. coli in 2021 and 2022.     Pt presented to [facility] on 5/26 for fever and chills. He states that is what happens when he gets a UTI. He has chronic abdominal pain from his numerous operations that he self treats with marijuana. He notes no change in abdominal pain. He denies dysuria, hematuria, pyuria, or suprapubic pain. Pt does state he recently started having sex with a new partner and hasn't been tested yet because he didn't want a urethral swab. When I explained to the patient a urine test could be done instead, he was very agreeable to being tested.     ED workup: Febrile at 38.2 on arrival, but HDS.  UA positive for trace blood and 250 LE, culture pending, mild leukocytosis of 15.4. CT showed no urologic abnormalities other than Rt nephrectomy. Received rocephin in the ED. No further episodes of fever.      Past Medical History  As above    Surgical History  Proctocolectomy with end ileostomy + J-pouch, ileostomy revision, Rt nephrectomy, laparoscopic YOVANY     Social History  He reports that he has never smoked. He has never used smokeless tobacco. No history on file for alcohol use and drug use.    Family History  No family history on file.     Allergies  Coconut and Hydrocodone-acetaminophen    Physical Exam  Physical Exam  Constitutional:       General: He is not in acute distress.     Appearance: Normal appearance. He is not ill-appearing or toxic-appearing.   Pulmonary:       "Effort: Pulmonary effort is normal. No respiratory distress.   Abdominal:      General: Abdomen is flat. There is no distension.      Palpations: Abdomen is soft.      Tenderness: There is abdominal tenderness (lower abdominal tenderness, chronic). There is no left CVA tenderness.      Comments: Multiple abdominal scars, well healed. RLQ ileostomy with pasty brown stool.   Skin:     General: Skin is warm and dry.   Neurological:      Mental Status: He is alert and oriented to person, place, and time.   Psychiatric:         Mood and Affect: Mood normal.         Behavior: Behavior normal.         Last Recorded Vitals  Blood pressure 108/63, pulse 82, temperature 37.1 °C (98.8 °F), temperature source Temporal, resp. rate 16, height 1.702 m (5' 7\"), weight 66.6 kg (146 lb 13.2 oz), SpO2 99%.    Relevant Results  Results for orders placed or performed during the hospital encounter of 05/26/24 (from the past 24 hour(s))   CBC and Auto Differential   Result Value Ref Range    WBC 15.4 (H) 4.4 - 11.3 x10*3/uL    nRBC 0.0 0.0 - 0.0 /100 WBCs    RBC 4.54 4.50 - 5.90 x10*6/uL    Hemoglobin 13.5 13.5 - 17.5 g/dL    Hematocrit 36.2 (L) 41.0 - 52.0 %    MCV 80 80 - 100 fL    MCH 29.7 26.0 - 34.0 pg    MCHC 37.3 (H) 32.0 - 36.0 g/dL    RDW 13.2 11.5 - 14.5 %    Platelets 212 150 - 450 x10*3/uL    Neutrophils % 88.2 40.0 - 80.0 %    Immature Granulocytes %, Automated 0.4 0.0 - 0.9 %    Lymphocytes % 5.3 13.0 - 44.0 %    Monocytes % 5.6 2.0 - 10.0 %    Eosinophils % 0.2 0.0 - 6.0 %    Basophils % 0.3 0.0 - 2.0 %    Neutrophils Absolute 13.56 (H) 1.20 - 7.70 x10*3/uL    Immature Granulocytes Absolute, Automated 0.06 0.00 - 0.70 x10*3/uL    Lymphocytes Absolute 0.81 (L) 1.20 - 4.80 x10*3/uL    Monocytes Absolute 0.86 0.10 - 1.00 x10*3/uL    Eosinophils Absolute 0.03 0.00 - 0.70 x10*3/uL    Basophils Absolute 0.05 0.00 - 0.10 x10*3/uL   Comprehensive metabolic panel   Result Value Ref Range    Glucose 94 74 - 99 mg/dL    Sodium 136 136 " - 145 mmol/L    Potassium 3.4 (L) 3.5 - 5.3 mmol/L    Chloride 105 98 - 107 mmol/L    Bicarbonate 23 21 - 32 mmol/L    Anion Gap 11 10 - 20 mmol/L    Urea Nitrogen 5 (L) 6 - 23 mg/dL    Creatinine 1.23 0.50 - 1.30 mg/dL    eGFR 80 >60 mL/min/1.73m*2    Calcium 9.0 8.6 - 10.3 mg/dL    Albumin 4.0 3.4 - 5.0 g/dL    Alkaline Phosphatase 56 33 - 120 U/L    Total Protein 6.5 6.4 - 8.2 g/dL    AST 21 9 - 39 U/L    Bilirubin, Total 0.6 0.0 - 1.2 mg/dL    ALT 18 10 - 52 U/L   Lipase   Result Value Ref Range    Lipase 30 9 - 82 U/L   Lactate   Result Value Ref Range    Lactate 1.1 0.4 - 2.0 mmol/L   Urinalysis with Reflex Culture and Microscopic   Result Value Ref Range    Color, Urine Yellow Light-Yellow, Yellow, Dark-Yellow    Appearance, Urine Clear Clear    Specific Gravity, Urine 1.018 1.005 - 1.035    pH, Urine 6.0 5.0, 5.5, 6.0, 6.5, 7.0, 7.5, 8.0    Protein, Urine 20 (TRACE) NEGATIVE, 10 (TRACE), 20 (TRACE) mg/dL    Glucose, Urine Normal Normal mg/dL    Blood, Urine 0.03 (TRACE) (A) NEGATIVE    Ketones, Urine NEGATIVE NEGATIVE mg/dL    Bilirubin, Urine NEGATIVE NEGATIVE    Urobilinogen, Urine Normal Normal mg/dL    Nitrite, Urine NEGATIVE NEGATIVE    Leukocyte Esterase, Urine 250 Alice/µL (A) NEGATIVE   Extra Urine Gray Tube   Result Value Ref Range    Extra Tube Hold for add-ons.    Microscopic Only, Urine   Result Value Ref Range    WBC, Urine 21-50 (A) 1-5, NONE /HPF    WBC Clumps, Urine RARE Reference range not established. /HPF    RBC, Urine 3-5 NONE, 1-2, 3-5 /HPF    Mucus, Urine FEW Reference range not established. /LPF   Magnesium   Result Value Ref Range    Magnesium 1.64 1.60 - 2.40 mg/dL     CT abdomen pelvis w IV contrast    Result Date: 5/26/2024  Interpreted By:  Aleks Avila, STUDY: CT ABDOMEN PELVIS W IV CONTRAST;  5/26/2024 7:02 am   INDICATION: Signs/Symptoms:abdominal pain.   COMPARISON: None.   ACCESSION NUMBER(S): [id number]   ORDERING CLINICIAN: MARCO DOE   TECHNIQUE: CT of the abdomen and  pelvis was performed.  Standard contiguous axial images were obtained at 3 mm slice thickness through the abdomen and pelvis. Coronal and sagittal reconstructions at 3 mm slice thickness were performed.   75 cc Omnipaque 350 administered intravenously without immediate complication.   FINDINGS: LOWER CHEST: The lung bases are hyperinflated.   ABDOMEN:   LIVER: The liver is grossly unremarkable. No definite focal liver lesions.   BILE DUCTS: No bile duct dilatation.   GALLBLADDER: Suboptimally distended gallbladder.   PANCREAS: Unremarkable pancreas.   SPLEEN: Unremarkable spleen.   ADRENAL GLANDS: No adrenal masses.   KIDNEYS AND URETERS: Right nephrectomy. Unremarkable left kidney. No hydronephrosis or hydroureter.   PELVIS:   BLADDER: Grossly unremarkable.   REPRODUCTIVE ORGANS: No definite masses.   BOWEL: Total colectomy with ileal anal pouch and right lower quadrant ostomy, similar to the prior exam. No evidence of bowel obstruction.   VESSELS: Grossly unremarkable.   PERITONEUM/RETROPERITONEUM/LYMPH NODES: No retroperitoneal adenopathy. No ascites.   BONES AND ABDOMINAL WALL: Right anterior abdominal ostomy. No acute osseous abnormalities.       Total colectomy with ileal anal pouch and right lower quadrant ostomy, similar to the prior exam.No bowel obstruction.   Right nephrectomy.   No acute abnormalities.   MACRO: None   Signed by: Aleks Avila 5/26/2024 8:04 AM Dictation workstation:   [id number]        Assessment/Plan   Ingrid Maurice is a 33 y.o. 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 He had neph tubes for some time but ultimately ended up having a right nephrectomy about 4-5 yrs ago. [place] [facility] [place]Is, then lost to follow-up. He said this is the first UTI he has had in over a year.  History of ESBL E. coli resistant to E. coli in  2021 and 2022. Pt presented to  [facility] on 5/26 for fever and chills. Febrile in ED at 38.2, no further fevers since. Received rocephin x1 in ED.    Impression:  UTI  H/o ESBL E. coli UTI  H/o Rt nephrectomy   H/o recurrent UTIs    Recommendations:  - As pt has had ESBL E. coli resistant to rocephin in the past, will switch abx to zosyn  - Follow-up culture results and de-escalate abx as appropriate  - Pt should follow-up with Dr. Delgado after discharge  - Remainder of care per primary team    Urology will follow peripherally.    Patient discussed with attending surgeon, Dr. Jacqueline Winchester PA-C

## 2024-05-26 NOTE — H&P
History Of Present Illness  Ingrid Maurice is a 33 y.o. 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  His girlfriend is at the bedside.  The patient tells me the pain started suddenly a couple days ago, and has been constant since.  He states it is a dull and achy pain all over his abdomen.  Nothing makes it better, though being out in the cold air makes it worse.  He denies any radiation and rates it a 6 out of 10.  He states he has had abdominal pain before, which he experienced shortly after he had his colectomy procedure performed.  He states his output from his colostomy has been normal.  He endorses fever/chills, headache and nausea, but denies lightheadedness/dizziness, chest pain, shortness of breath, diarrhea, hematuria, urgency/frequency, dysuria, penile discharge, itching, burning of genitals.  He tells me he is sexually active and does not use protection, though he is not concerned about STIs today. He denies use of alcohol and cigarettes, but admits to using marijuana daily.    ED course: On arrival, patient has temperature of 100.8 degrees, otherwise vital signs stable.  Labs and imaging performed, revealing potassium 3.4, creatinine normal, LFTs normal, lactate normal, lipase normal, WBC 15.4.  Urinalysis shows evidence of infection.  Urine cultures and blood cultures ordered.  CT abdomen/pelvis shows right nephrectomy, unremarkable left kidney, no hydronephrosis or hydroureter, no evidence of bowel obstruction, no other acute abnormalities.  Patient given Rocephin, 2 1000 mL NS fluid boluses, morphine, and Zofran in the ED.  Patient to be admitted observation under Dr. Ngo.    Past Medical History  Past Medical History:    Diagnosis Date    Hemorrhage of anus and rectum     Hemorrhage of rectum and anus       Surgical History  Past Surgical History:   Procedure Laterality Date    ABDOMINAL ADHESION SURGERY  10/14/2013    Laparoscopic Lysis Of Intestinal Adhesions    CT GUIDED PERCUTANEOUS PERITONEAL OR RETROPERITONEAL FLUID COLLECTION DRAINAGE  7/3/2018    CT GUIDED PERCUTANEOUS PERITONEAL OR RETROPERITONEAL FLUID COLLECTION DRAINAGE 7/3/2018 [facility]ICAL LEGAC[facility]    EXPLORATORY LAPAROTOMY  10/14/2013    Exploratory Laparotomy    IR GI TUBE EXCHANGE  1/8/2013    IR GI TUBE EXCHANGE 1/8/2013 [facility]IB [facility]GAC[facility]    IR GI TUBE EXCHANGE  3/13/2013    IR GI TUBE EXCHANGE 3/13/2013 [facility] CL[facility]ICAL LEGAC[facility]    IR GI TUBE EXCHANGE  4/12/2013    IR GI TUBE EXCHANGE 4/12/2013 [facility] EMERGENCY LEGACY    IR GI TUBE EXCHANGE  6/19/2013    IR GI TUBE EXCHANGE 6/19/2013 [facility] CL[facility]ICAL LEGACY    IR GI TUBE EXCHANGE  7/24/2013    IR GI TUBE EXCHANGE 7/24/2013 [facility] CL[facility]ICAL LEGAC[facility]    IR GI TUBE EXCHANGE  11/25/2013    IR GI TUBE EXCHANGE 11/25/2013 [facility] CL[facility]ICAL LEGAC[facility]    IR GI TUBE EXCHANGE  3/6/2014    IR GI TUBE EXCHANGE 3/6/2014 [facility] CL[facility]ICAL LEGAC[facility]    IR GI TUBE EXCHANGE  5/2/2014    IR GI TUBE EXCHANGE 5/2/2014 [facility] CL[facility]ICAL LEGAC[facility]    IR GI TUBE EXCHANGE  6/9/2014    IR GI TUBE EXCHANGE 6/9/2014 [facility]IB [facility]GAC[facility]    IR GI TUBE EXCHANGE  7/14/2014    IR GI TUBE EXCHANGE 7/14/2014 [facility] CL[facility]ICAL LEGAC[facility]    IR GI TUBE EXCHANGE  8/18/2014    IR GI TUBE EXCHANGE 8/18/2014 [facility] CL[facility]ICAL LEGAC[facility]    IR GI TUBE EXCHANGE  10/7/2014    IR GI TUBE EXCHANGE 10/7/2014 [facility]    IR GI TUBE EXCHANGE  11/30/2014    IR GI TUBE EXCHANGE 11/30/2014 [facility] CL[facility]ICAL LEGAC[facility]    IR GI TUBE EXCHANGE  12/30/2014    IR GI TUBE EXCHANGE 12/30/2014 [facility]    IR GI TUBE EXCHANGE  4/8/2015    IR GI TUBE EXCHANGE 4/8/2015 [facility]    IR GI TUBE EXCHANGE  6/5/2015    IR GI TUBE EXCHANGE 6/5/2015 [facility]    IR GI TUBE EXCHANGE  8/3/2015    IR GI TUBE EXCHANGE 8/3/2015  [facility] CLINICAL LEGACY    IR GI TUBE EXCHANGE  11/25/2015    IR GI TUBE EXCHANGE 11/25/2015 [facility]    IR GI TUBE EXCHANGE  12/28/2015    IR GI TUBE EXCHANGE 12/28/2015 [facility] CLINICAL LEGACY    IR GI TUBE EXCHANGE  3/21/2012    IR GI TUBE EXCHANGE 3/21/2012 [facility]    IR GI TUBE EXCHANGE  5/24/2012    IR GI TUBE EXCHANGE 5/24/2012 [facility] INPATIENT [facility]GAC[facility]    IR GI TUBE EXCHANGE  8/7/2012    IR GI TUBE EXCHANGE 8/7/2012 [facility]    IR GI TUBE EXCHANGE  9/5/2012    IR GI TUBE EXCHANGE 9/5/2012 [facility]    IR GI TUBE EXCHANGE  11/5/2012    IR GI TUBE EXCHANGE 11/5/2012 [facility] CLINICAL LEGACY    IR NEPHROSTOMY TUBE EXCHANGE  1/8/2013    IR NEPHROSTOMY TUBE EXCHANGE 1/8/2013 [facility]    IR NEPHROSTOMY TUBE EXCHANGE  3/13/2013    IR NEPHROSTOMY TUBE EXCHANGE 3/13/2013 [facility] CLINICAL LEGACY    IR NEPHROSTOMY TUBE EXCHANGE  4/12/2013    IR NEPHROSTOMY TUBE EXCHANGE 4/12/2013 [facility]GENCY [facility]    IR NEPHROSTOMY TUBE EXCHANGE  6/19/2013    IR NEPHROSTOMY TUBE EXCHANGE 6/19/2013 [facility] CLINICAL LEGACY    IR NEPHROSTOMY TUBE EXCHANGE  7/24/2013    IR NEPHROSTOMY TUBE EXCHANGE 7/24/2013 [facility] CLINICAL LEGACY    IR NEPHROSTOMY TUBE EXCHANGE  11/25/2013    IR NEPHROSTOMY TUBE EXCHANGE 11/25/2013 [facility] CLINICAL LEGACY    IR NEPHROSTOMY TUBE EXCHANGE  3/6/2014    IR NEPHROSTOMY TUBE EXCHANGE 3/6/2014 [facility] CLINICAL LEGACY    IR NEPHROSTOMY TUBE EXCHANGE  5/2/2014    IR NEPHROSTOMY TUBE EXCHANGE 5/2/2014 [facility] CL[facility]ICAL LEGACY    IR NEPHROSTOMY TUBE EXCHANGE  6/9/2014    IR NEPHROSTOMY TUBE EXCHANGE 6/9/2014 [facility]    IR NEPHROSTOMY TUBE EXCHANGE  7/14/2014    IR NEPHROSTOMY TUBE EXCHANGE 7/14/2014 [facility] CLINICAL LEGACY    IR NEPHROSTOMY TUBE EXCHANGE  8/18/2014    IR NEPHROSTOMY TUBE EXCHANGE 8/18/2014 [facility] CLINICAL LEGACY    IR NEPHROSTOMY TUBE EXCHANGE  10/7/2014    IR NEPHROSTOMY TUBE EXCHANGE 10/7/2014 [facility]    IR NEPHROSTOMY TUBE EXCHANGE  11/30/2014    IR NEPHROSTOMY TUBE EXCHANGE 11/30/2014  [facility] CLINICAL LEGACY    IR NEPHROSTOMY TUBE EXCHANGE  12/30/2014    IR NEPHROSTOMY TUBE EXCHANGE 12/30/2014 [facility]    IR NEPHROSTOMY TUBE EXCHANGE  4/8/2015    IR NEPHROSTOMY TUBE EXCHANGE 4/8/2015 [facility]    IR NEPHROSTOMY TUBE EXCHANGE  6/5/2015    IR NEPHROSTOMY TUBE EXCHANGE 6/5/2015 [facility]    IR NEPHROSTOMY TUBE EXCHANGE  8/3/2015    IR NEPHROSTOMY TUBE EXCHANGE 8/3/2015 [facility] CLINICAL LEGACY    IR NEPHROSTOMY TUBE EXCHANGE  10/1/2015    IR NEPHROSTOMY TUBE EXCHANGE 10/1/2015 [facility]    IR NEPHROSTOMY TUBE EXCHANGE  11/25/2015    IR NEPHROSTOMY TUBE EXCHANGE 11/25/2015 [facility]    IR NEPHROSTOMY TUBE EXCHANGE  12/28/2015    IR NEPHROSTOMY TUBE EXCHANGE 12/28/2015 [facility] CLINICAL LEGACY    IR NEPHROSTOMY TUBE EXCHANGE  1/28/2016    IR NEPHROSTOMY TUBE EXCHANGE 1/28/2016 [facility]    IR NEPHROSTOMY TUBE EXCHANGE  1/10/2018    IR NEPHROSTOMY TUBE EXCHANGE 1/10/2018 [facility]    IR NEPHROSTOMY TUBE EXCHANGE  4/30/2018    IR NEPHROSTOMY TUBE EXCHANGE 4/30/2018 [facility]    IR NEPHROSTOMY TUBE EXCHANGE  5/22/2018    IR NEPHROSTOMY TUBE EXCHANGE 5/22/2018 [facility]GENCY [facility]    IR NEPHROSTOMY TUBE EXCHANGE  1/31/2017    IR NEPHROSTOMY TUBE EXCHANGE 1/31/2017 [facility]    IR NEPHROSTOMY TUBE EXCHANGE  3/15/2017    IR NEPHROSTOMY TUBE EXCHANGE 3/15/2017 [facility]    IR NEPHROSTOMY TUBE EXCHANGE  4/19/2017    IR NEPHROSTOMY TUBE EXCHANGE 4/19/2017 [facility]    IR NEPHROSTOMY TUBE EXCHANGE  5/18/2017    IR NEPHROSTOMY TUBE EXCHANGE 5/18/2017 [facility]    IR NEPHROSTOMY TUBE EXCHANGE  6/29/2017    IR NEPHROSTOMY TUBE EXCHANGE 6/29/2017 [facility]    IR NEPHROSTOMY TUBE EXCHANGE  8/17/2017    IR NEPHROSTOMY TUBE EXCHANGE 8/17/2017 [facility]    IR NEPHROSTOMY TUBE EXCHANGE  10/13/2017    IR NEPHROSTOMY TUBE EXCHANGE 10/13/2017 [facility]    IR NEPHROSTOMY TUBE EXCHANGE  3/21/2012    IR NEPHROSTOMY TUBE EXCHANGE 3/21/2012 [facility]    IR  NEPHROSTOMY TUBE EXCHANGE  5/24/2012    IR NEPHROSTOMY TUBE EXCHANGE 5/24/2012 [facility] INPATIENT LEGACY    IR NEPHROSTOMY TUBE EXCHANGE  8/7/2012    IR NEPHROSTOMY TUBE EXCHANGE 8/7/2012 [facility] AIB LEGACY    IR NEPHROSTOMY TUBE EXCHANGE  9/5/2012    IR NEPHROSTOMY TUBE EXCHANGE 9/5/2012 [facility] AIB LEGAC[facility]    IR NEPHROSTOMY TUBE EXCHANGE  11/5/2012    IR NEPHROSTOMY TUBE EXCHANGE 11/5/2012 [facility] CLINICAL LEGACY    OTHER SURGICAL HISTORY  10/14/2013    Laparos Total Colectomy W/ Proctectomy, Ileoanal Anastomosis    OTHER SURGICAL HISTORY  10/14/2013    [facility] Pouch Endoscopy    OTHER SURGICAL HISTORY  10/14/2013    Ileostomy Non-Tube    OTHER SURGICAL HISTORY  10/14/2013    Ileostomy Revision Complex    US GUIDED PERCUTANEOUS PERITONEAL OR RETROPERITONEAL FLUID COLLECTION DRAINAGE  1/2/2015    US GUIDED PERCUTANEOUS PERITONEAL OR RETROPERITONEAL FLUID COLLECTION DRAINAGE 1/2/2015 [facility] EMERGENCY LEGACY        Social History  He reports that he has never smoked. He has never used smokeless tobacco. No history on file for alcohol use and drug use.    Family History  No family history on file.     Allergies  Coconut and Hydrocodone-acetaminophen    Review of Systems   Constitutional:  Positive for chills and fever.   HENT:  Negative for congestion, rhinorrhea and sore throat.    Respiratory:  Positive for wheezing. Negative for cough and shortness of breath.    Cardiovascular:  Negative for chest pain, palpitations and leg swelling.   Gastrointestinal:  Positive for abdominal pain and nausea. Negative for abdominal distention, blood in stool, constipation, diarrhea and vomiting.   Genitourinary:  Negative for difficulty urinating, dysuria, frequency, hematuria, penile discharge, penile pain, penile swelling, testicular pain and urgency.   Neurological:  Negative for dizziness, weakness and light-headedness.        Physical Exam  Constitutional:       Appearance: He is ill-appearing. He is not toxic-appearing.      Comments: Patient is  "lethargic. He did fall asleep once during my interview.    HENT:      Head: Normocephalic and atraumatic.      Nose: Nose normal.      Mouth/Throat:      Mouth: Mucous membranes are moist.      Pharynx: Oropharynx is clear.   Eyes:      Extraocular Movements: Extraocular movements intact.      Conjunctiva/sclera: Conjunctivae normal.      Pupils: Pupils are equal, round, and reactive to light.   Cardiovascular:      Rate and Rhythm: Normal rate and regular rhythm.      Pulses: Normal pulses.   Pulmonary:      Effort: Pulmonary effort is normal. No respiratory distress.      Breath sounds: Wheezing (Present in left lung fields) present.   Abdominal:      General: Abdomen is flat. Bowel sounds are normal.      Palpations: Abdomen is soft.      Tenderness: There is abdominal tenderness.   Musculoskeletal:         General: Normal range of motion.      Cervical back: Normal range of motion.      Right lower leg: No edema.      Left lower leg: No edema.   Skin:     General: Skin is warm and dry.   Neurological:      General: No focal deficit present.      Mental Status: He is oriented to person, place, and time.   Psychiatric:         Mood and Affect: Mood normal.         Behavior: Behavior normal.         Thought Content: Thought content normal.          Last Recorded Vitals  Blood pressure 119/73, pulse 89, temperature 37.8 °C (100 °F), temperature source Temporal, resp. rate 16, height 1.702 m (5' 7\"), weight 66.6 kg (146 lb 13.2 oz), SpO2 96%.    Relevant Results    Results for orders placed or performed during the hospital encounter of 05/26/24 (from the past 24 hour(s))   CBC and Auto Differential   Result Value Ref Range    WBC 15.4 (H) 4.4 - 11.3 x10*3/uL    nRBC 0.0 0.0 - 0.0 /100 WBCs    RBC 4.54 4.50 - 5.90 x10*6/uL    Hemoglobin 13.5 13.5 - 17.5 g/dL    Hematocrit 36.2 (L) 41.0 - 52.0 %    MCV 80 80 - 100 fL    MCH 29.7 26.0 - 34.0 pg    MCHC 37.3 (H) 32.0 - 36.0 g/dL    RDW 13.2 11.5 - 14.5 %    Platelets 212 " 150 - 450 x10*3/uL    Neutrophils % 88.2 40.0 - 80.0 %    Immature Granulocytes %, Automated 0.4 0.0 - 0.9 %    Lymphocytes % 5.3 13.0 - 44.0 %    Monocytes % 5.6 2.0 - 10.0 %    Eosinophils % 0.2 0.0 - 6.0 %    Basophils % 0.3 0.0 - 2.0 %    Neutrophils Absolute 13.56 (H) 1.20 - 7.70 x10*3/uL    Immature Granulocytes Absolute, Automated 0.06 0.00 - 0.70 x10*3/uL    Lymphocytes Absolute 0.81 (L) 1.20 - 4.80 x10*3/uL    Monocytes Absolute 0.86 0.10 - 1.00 x10*3/uL    Eosinophils Absolute 0.03 0.00 - 0.70 x10*3/uL    Basophils Absolute 0.05 0.00 - 0.10 x10*3/uL   Comprehensive metabolic panel   Result Value Ref Range    Glucose 94 74 - 99 mg/dL    Sodium 136 136 - 145 mmol/L    Potassium 3.4 (L) 3.5 - 5.3 mmol/L    Chloride 105 98 - 107 mmol/L    Bicarbonate 23 21 - 32 mmol/L    Anion Gap 11 10 - 20 mmol/L    Urea Nitrogen 5 (L) 6 - 23 mg/dL    Creatinine 1.23 0.50 - 1.30 mg/dL    eGFR 80 >60 mL/min/1.73m*2    Calcium 9.0 8.6 - 10.3 mg/dL    Albumin 4.0 3.4 - 5.0 g/dL    Alkaline Phosphatase 56 33 - 120 U/L    Total Protein 6.5 6.4 - 8.2 g/dL    AST 21 9 - 39 U/L    Bilirubin, Total 0.6 0.0 - 1.2 mg/dL    ALT 18 10 - 52 U/L   Lipase   Result Value Ref Range    Lipase 30 9 - 82 U/L   Lactate   Result Value Ref Range    Lactate 1.1 0.4 - 2.0 mmol/L   Urinalysis with Reflex Culture and Microscopic   Result Value Ref Range    Color, Urine Yellow Light-Yellow, Yellow, Dark-Yellow    Appearance, Urine Clear Clear    Specific Gravity, Urine 1.018 1.005 - 1.035    pH, Urine 6.0 5.0, 5.5, 6.0, 6.5, 7.0, 7.5, 8.0    Protein, Urine 20 (TRACE) NEGATIVE, 10 (TRACE), 20 (TRACE) mg/dL    Glucose, Urine Normal Normal mg/dL    Blood, Urine 0.03 (TRACE) (A) NEGATIVE    Ketones, Urine NEGATIVE NEGATIVE mg/dL    Bilirubin, Urine NEGATIVE NEGATIVE    Urobilinogen, Urine Normal Normal mg/dL    Nitrite, Urine NEGATIVE NEGATIVE    Leukocyte Esterase, Urine 250 Alice/µL (A) NEGATIVE   Microscopic Only, Urine   Result Value Ref Range    WBC,  Urine 21-50 (A) 1-5, NONE /HPF    WBC Clumps, Urine RARE Reference range not established. /HPF    RBC, Urine 3-5 NONE, 1-2, 3-5 /HPF    Mucus, Urine FEW Reference range not established. /LPF      CT abdomen pelvis w IV contrast    Result Date: 5/26/2024  Interpreted By:  Aleks Avila, STUDY: CT ABDOMEN PELVIS W IV CONTRAST;  5/26/2024 7:02 am   INDICATION: Signs/Symptoms:abdominal pain.   COMPARISON: None.   ACCESSION NUMBER(S): [id number]   ORDERING CLINICIAN: MARCO DOE   TECHNIQUE: CT of the abdomen and pelvis was performed.  Standard contiguous axial images were obtained at 3 mm slice thickness through the abdomen and pelvis. Coronal and sagittal reconstructions at 3 mm slice thickness were performed.   75 cc Omnipaque 350 administered intravenously without immediate complication.   FINDINGS: LOWER CHEST: The lung bases are hyperinflated.   ABDOMEN:   LIVER: The liver is grossly unremarkable. No definite focal liver lesions.   BILE DUCTS: No bile duct dilatation.   GALLBLADDER: Suboptimally distended gallbladder.   PANCREAS: Unremarkable pancreas.   SPLEEN: Unremarkable spleen.   ADRENAL GLANDS: No adrenal masses.   KIDNEYS AND URETERS: Right nephrectomy. Unremarkable left kidney. No hydronephrosis or hydroureter.   PELVIS:   BLADDER: Grossly unremarkable.   REPRODUCTIVE ORGANS: No definite masses.   BOWEL: Total colectomy with ileal anal pouch and right lower quadrant ostomy, similar to the prior exam. No evidence of bowel obstruction.   VESSELS: Grossly unremarkable.   PERITONEUM/RETROPERITONEUM/LYMPH NODES: No retroperitoneal adenopathy. No ascites.   BONES AND ABDOMINAL WALL: Right anterior abdominal ostomy. No acute osseous abnormalities.       Total colectomy with ileal anal pouch and right lower quadrant ostomy, similar to the prior exam.No bowel obstruction.   Right nephrectomy.   No acute abnormalities.   MACRO: None   Signed by: Aleks Avila 5/26/2024 8:04 AM Dictation workstation:   [id number]        Assessment/Plan   Principal Problem:    Chest pain    Suspected sepsis  Urinary tract infection  Leukocytosis  -Urology consult and recommendations appreciated  -UA shows evidence of infection, urine culture pending  -IV Rocephin initiated in the ED, continue  -IV fluids  -Following blood cultures  -Acetaminophen as needed for pain and fever  -Morphine as needed for pain control  -Pt can have Zofran once EKG comes back  -Postvoid residual ordered    Hypokalemia  -Potassium 3.4 today  -40 mg potassium replacement ordered  -Monitor with morning BMP, Mg    DVT prophylaxis  -Lovenox  -SCDs    Chronic conditions: 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      Mirtha Delong PA-C

## 2024-05-26 NOTE — ED PROVIDER NOTES
HPI   Chief Complaint   Patient presents with    Fever       Patient presents with abdominal pain and fever.  Patient has a history of total colectomy with a history of familial adenomatous polyposis.  Also sounds like he had dermoid tumor and required a right nephrectomy.  He has associated nausea.  He has normal output out of his ostomy.  No upper respiratory symptoms.  He has associated chills.                          [place] Coma Scale Score: 15                     Patient History   Past Medical History:   Diagnosis Date    Hemorrhage of anus and rectum     Hemorrhage of rectum and anus     Past Surgical History:   Procedure Laterality Date    ABDOMINAL ADHESION SURGERY  10/14/2013    Laparoscopic Lysis Of Intestinal Adhesions    CT GUIDED PERCUTANEOUS PERITONEAL OR RETROPERITONEAL FLUID COLLECTION DRAINAGE  7/3/2018    CT GUIDED PERCUTANEOUS PERITONEAL OR RETROPERITONEAL FLUID COLLECTION DRAINAGE 7/3/2018 [facility] CLINICAL LEGACY    EXPLORATORY LAPAROTOMY  10/14/2013    Exploratory Laparotomy    IR GI TUBE EXCHANGE  1/8/2013    IR GI TUBE EXCHANGE 1/8/2013 [facility]    IR GI TUBE EXCHANGE  3/13/2013    IR GI TUBE EXCHANGE 3/13/2013 [facility] CLINICAL LEGACY    IR GI TUBE EXCHANGE  4/12/2013    IR GI TUBE EXCHANGE 4/12/2013 [facility]Y [facility]    IR GI TUBE EXCHANGE  6/19/2013    IR GI TUBE EXCHANGE 6/19/2013 [facility] CLINICAL LEGAC[facility]    IR GI TUBE EXCHANGE  7/24/2013    IR GI TUBE EXCHANGE 7/24/2013 [facility] CLINICAL [facility]GAC[facility]    IR GI TUBE EXCHANGE  11/25/2013    IR GI TUBE EXCHANGE 11/25/2013 [facility] CL[facility]ICAL [facility]GAC[facility]    IR GI TUBE EXCHANGE  3/6/2014    IR GI TUBE EXCHANGE 3/6/2014 [facility] CLINICAL [facility]GAC[facility]    IR GI TUBE EXCHANGE  5/2/2014    IR GI TUBE EXCHANGE 5/2/2014 [facility] CLINICAL [facility]GAC[facility]    IR GI TUBE EXCHANGE  6/9/2014    IR GI TUBE EXCHANGE 6/9/2014 [facility]    IR GI TUBE EXCHANGE  7/14/2014    IR GI TUBE EXCHANGE 7/14/2014 [facility] CLINICAL [facility]GAC[facility]    IR GI TUBE EXCHANGE  8/18/2014    IR GI TUBE EXCHANGE 8/18/2014 [facility]  CLINICAL LEGACY    IR GI TUBE EXCHANGE  10/7/2014    IR GI TUBE EXCHANGE 10/7/2014 [facility]    IR GI TUBE EXCHANGE  11/30/2014    IR GI TUBE EXCHANGE 11/30/2014 [facility] CLINICAL LEGAC[facility]    IR GI TUBE EXCHANGE  12/30/2014    IR GI TUBE EXCHANGE 12/30/2014 [facility]    IR GI TUBE EXCHANGE  4/8/2015    IR GI TUBE EXCHANGE 4/8/2015 [facility]    IR GI TUBE EXCHANGE  6/5/2015    IR GI TUBE EXCHANGE 6/5/2015 [facility]    IR GI TUBE EXCHANGE  8/3/2015    IR GI TUBE EXCHANGE 8/3/2015 [facility] CLINICAL [facility]GAC[facility]    IR GI TUBE EXCHANGE  11/25/2015    IR GI TUBE EXCHANGE 11/25/2015 [facility]    IR GI TUBE EXCHANGE  12/28/2015    IR GI TUBE EXCHANGE 12/28/2015 [facility] CLINICAL [facility]GAC[facility]    IR GI TUBE EXCHANGE  3/21/2012    IR GI TUBE EXCHANGE 3/21/2012 [facility]    IR GI TUBE EXCHANGE  5/24/2012    IR GI TUBE EXCHANGE 5/24/2012 [facility]    IR GI TUBE EXCHANGE  8/7/2012    IR GI TUBE EXCHANGE 8/7/2012 [facility]    IR GI TUBE EXCHANGE  9/5/2012    IR GI TUBE EXCHANGE 9/5/2012 [facility]    IR GI TUBE EXCHANGE  11/5/2012    IR GI TUBE EXCHANGE 11/5/2012 [facility] CL[facility]ICAL [facility]GAC[facility]    IR NEPHROSTOMY TUBE EXCHANGE  1/8/2013    IR NEPHROSTOMY TUBE EXCHANGE 1/8/2013 [facility]    IR NEPHROSTOMY TUBE EXCHANGE  3/13/2013    IR NEPHROSTOMY TUBE EXCHANGE 3/13/2013 [facility] CL[facility]ICAL [facility]GAC[facility]    IR NEPHROSTOMY TUBE EXCHANGE  4/12/2013    IR NEPHROSTOMY TUBE EXCHANGE 4/12/2013 [facility]    IR NEPHROSTOMY TUBE EXCHANGE  6/19/2013    IR NEPHROSTOMY TUBE EXCHANGE 6/19/2013 [facility] CL[facility]ICAL [facility]GAC[facility]    IR NEPHROSTOMY TUBE EXCHANGE  7/24/2013    IR NEPHROSTOMY TUBE EXCHANGE 7/24/2013 [facility] CL[facility]ICAL [facility]GAC[facility]    IR NEPHROSTOMY TUBE EXCHANGE  11/25/2013    IR NEPHROSTOMY TUBE EXCHANGE 11/25/2013 [facility] CL[facility]ICAL [facility]GAC[facility]    IR NEPHROSTOMY TUBE EXCHANGE  3/6/2014    IR NEPHROSTOMY TUBE EXCHANGE 3/6/2014 [facility] CL[facility]ICAL [facility]GAC[facility]    IR NEPHROSTOMY TUBE EXCHANGE  5/2/2014    IR NEPHROSTOMY TUBE EXCHANGE 5/2/2014 [facility]GAC[facility]     IR NEPHROSTOMY TUBE EXCHANGE  6/9/2014    IR NEPHROSTOMY TUBE EXCHANGE 6/9/2014 [facility]    IR NEPHROSTOMY TUBE EXCHANGE  7/14/2014    IR NEPHROSTOMY TUBE EXCHANGE 7/14/2014 [facility] CL[facility]ICAL LEGAC[facility]    IR NEPHROSTOMY TUBE EXCHANGE  8/18/2014    IR NEPHROSTOMY TUBE EXCHANGE 8/18/2014 [facility] CL[facility]ICAL [facility]GAC[facility]    IR NEPHROSTOMY TUBE EXCHANGE  10/7/2014    IR NEPHROSTOMY TUBE EXCHANGE 10/7/2014 [facility]    IR NEPHROSTOMY TUBE EXCHANGE  11/30/2014    IR NEPHROSTOMY TUBE EXCHANGE 11/30/2014 [facility]GAC[facility]    IR NEPHROSTOMY TUBE EXCHANGE  12/30/2014    IR NEPHROSTOMY TUBE EXCHANGE 12/30/2014 [facility]    IR NEPHROSTOMY TUBE EXCHANGE  4/8/2015    IR NEPHROSTOMY TUBE EXCHANGE 4/8/2015 [facility]    IR NEPHROSTOMY TUBE EXCHANGE  6/5/2015    IR NEPHROSTOMY TUBE EXCHANGE 6/5/2015 [facility]    IR NEPHROSTOMY TUBE EXCHANGE  8/3/2015    IR NEPHROSTOMY TUBE EXCHANGE 8/3/2015 [facility] CL[facility]ICAL [facility]GAC[facility]    IR NEPHROSTOMY TUBE EXCHANGE  10/1/2015    IR NEPHROSTOMY TUBE EXCHANGE 10/1/2015 [facility]    IR NEPHROSTOMY TUBE EXCHANGE  11/25/2015    IR NEPHROSTOMY TUBE EXCHANGE 11/25/2015 [facility]    IR NEPHROSTOMY TUBE EXCHANGE  12/28/2015    IR NEPHROSTOMY TUBE EXCHANGE 12/28/2015 [facility]ICAL [facility]GAC[facility]    IR NEPHROSTOMY TUBE EXCHANGE  1/28/2016    IR NEPHROSTOMY TUBE EXCHANGE 1/28/2016 [facility]    IR NEPHROSTOMY TUBE EXCHANGE  1/10/2018    IR NEPHROSTOMY TUBE EXCHANGE 1/10/2018 [facility]    IR NEPHROSTOMY TUBE EXCHANGE  4/30/2018    IR NEPHROSTOMY TUBE EXCHANGE 4/30/2018 [facility]    IR NEPHROSTOMY TUBE EXCHANGE  5/22/2018    IR NEPHROSTOMY TUBE EXCHANGE 5/22/2018 [facility]    IR NEPHROSTOMY TUBE EXCHANGE  1/31/2017    IR NEPHROSTOMY TUBE EXCHANGE 1/31/2017 [facility]    IR NEPHROSTOMY TUBE EXCHANGE  3/15/2017    IR NEPHROSTOMY TUBE EXCHANGE 3/15/2017 [facility]    IR NEPHROSTOMY TUBE EXCHANGE  4/19/2017    IR NEPHROSTOMY TUBE EXCHANGE 4/19/2017 [facility]    IR  NEPHROSTOMY TUBE EXCHANGE  5/18/2017    IR NEPHROSTOMY TUBE EXCHANGE 5/18/2017 [facility]    IR NEPHROSTOMY TUBE EXCHANGE  6/29/2017    IR NEPHROSTOMY TUBE EXCHANGE 6/29/2017 [facility]    IR NEPHROSTOMY TUBE EXCHANGE  8/17/2017    IR NEPHROSTOMY TUBE EXCHANGE 8/17/2017 [facility]    IR NEPHROSTOMY TUBE EXCHANGE  10/13/2017    IR NEPHROSTOMY TUBE EXCHANGE 10/13/2017 [facility]    IR NEPHROSTOMY TUBE EXCHANGE  3/21/2012    IR NEPHROSTOMY TUBE EXCHANGE 3/21/2012 [facility]    IR NEPHROSTOMY TUBE EXCHANGE  5/24/2012    IR NEPHROSTOMY TUBE EXCHANGE 5/24/2012 [facility]    IR NEPHROSTOMY TUBE EXCHANGE  8/7/2012    IR NEPHROSTOMY TUBE EXCHANGE 8/7/2012 [facility]    IR NEPHROSTOMY TUBE EXCHANGE  9/5/2012    IR NEPHROSTOMY TUBE EXCHANGE 9/5/2012 [facility]    IR NEPHROSTOMY TUBE EXCHANGE  11/5/2012    IR NEPHROSTOMY TUBE EXCHANGE 11/5/2012 [facility] CL[facility]ICAL [facility]GAC[facility]    OTHER SURGICAL HISTORY  10/14/2013    Laparos Total Colectomy W/ Proctectomy, Ileoanal Anastomosis    OTHER SURGICAL HISTORY  10/14/2013    [place] Pouch Endoscopy    OTHER SURGICAL HISTORY  10/14/2013    Ileostomy Non-Tube    OTHER SURGICAL HISTORY  10/14/2013    Ileostomy Revision Complex    US GUIDED PERCUTANEOUS PERITONEAL OR RETROPERITONEAL FLUID COLLECTION DRAINAGE  1/2/2015    US GUIDED PERCUTANEOUS PERITONEAL OR RETROPERITONEAL FLUID COLLECTION DRAINAGE 1/2/2015 [facility] [place] [place] [facility]GENCY LEGACY     No family history on file.  Social History     Tobacco Use    Smoking status: Never    Smokeless tobacco: Never   Substance Use Topics    Alcohol use: Not on file    Drug use: Not on file       Physical Exam   ED Triage Vitals [05/26/24 0315]   Temperature Heart Rate Respirations BP   (!) 38.2 °C (100.8 °F) 90 18 120/67      Pulse Ox Temp src Heart Rate Source Patient Position   95 % -- -- --      BP Location FiO2 (%)     -- --       Physical Exam  Vitals and nursing note reviewed.   Constitutional:       General: He is not in acute  distress.     Appearance: He is well-developed.   HENT:      Head: Normocephalic and atraumatic.   Eyes:      Conjunctiva/sclera: Conjunctivae normal.   Cardiovascular:      Rate and Rhythm: Normal rate and regular rhythm.      Heart sounds: No murmur heard.  Pulmonary:      Effort: Pulmonary effort is normal. No respiratory distress.      Breath sounds: Normal breath sounds.   Abdominal:      Palpations: Abdomen is soft.      Tenderness: There is abdominal tenderness.      Comments: Generalized tenderness   Musculoskeletal:         General: No swelling.      Cervical back: Neck supple.   Skin:     General: Skin is warm and dry.      Capillary Refill: Capillary refill takes less than 2 seconds.   Neurological:      Mental Status: He is alert.   Psychiatric:         Mood and Affect: Mood normal.         ED Course & MDM   Diagnoses as of 05/27/24 2057   Pyelonephritis       Medical Decision Making  Patient is febrile with a temperature of 38.2.  He was given Zofran 4 mg IV.  Was given a liter normal saline.  Urinalysis shows pyuria so he was given Rocephin 2 g IV.  He was given morphine 4 mg IV.  He has a 15,000 white count.  He meets sepsis criteria.  Patient CT abdomen pelvis pending at this time.  Case was discussed with oncoming physician and care be turned over to them.  Prior medical records were reviewed.        Procedure  Procedures     Brian Mccollum MD  05/27/24 2058

## 2024-05-27 PROBLEM — N12 PYELONEPHRITIS: Status: ACTIVE | Noted: 2024-05-27

## 2024-05-27 LAB
ANION GAP SERPL CALC-SCNC: 11 MMOL/L (ref 10–20)
APPEARANCE UR: CLEAR
BACTERIA #/AREA URNS AUTO: ABNORMAL /HPF
BACTERIA UR CULT: NORMAL
BILIRUB UR STRIP.AUTO-MCNC: NEGATIVE MG/DL
BUN SERPL-MCNC: 6 MG/DL (ref 6–23)
C TRACH RRNA SPEC QL NAA+PROBE: NEGATIVE
CALCIUM SERPL-MCNC: 8.5 MG/DL (ref 8.6–10.3)
CHLORIDE SERPL-SCNC: 105 MMOL/L (ref 98–107)
CO2 SERPL-SCNC: 24 MMOL/L (ref 21–32)
COLOR UR: ABNORMAL
CREAT SERPL-MCNC: 1.4 MG/DL (ref 0.5–1.3)
EGFRCR SERPLBLD CKD-EPI 2021: 68 ML/MIN/1.73M*2
ERYTHROCYTE [DISTWIDTH] IN BLOOD BY AUTOMATED COUNT: 13.7 % (ref 11.5–14.5)
GLUCOSE SERPL-MCNC: 84 MG/DL (ref 74–99)
GLUCOSE UR STRIP.AUTO-MCNC: NORMAL MG/DL
HCT VFR BLD AUTO: 35.2 % (ref 41–52)
HGB BLD-MCNC: 12.3 G/DL (ref 13.5–17.5)
HOLD SPECIMEN: NORMAL
KETONES UR STRIP.AUTO-MCNC: NEGATIVE MG/DL
LEUKOCYTE ESTERASE UR QL STRIP.AUTO: ABNORMAL
MAGNESIUM SERPL-MCNC: 1.76 MG/DL (ref 1.6–2.4)
MCH RBC QN AUTO: 28.7 PG (ref 26–34)
MCHC RBC AUTO-ENTMCNC: 34.9 G/DL (ref 32–36)
MCV RBC AUTO: 82 FL (ref 80–100)
MUCOUS THREADS #/AREA URNS AUTO: ABNORMAL /LPF
N GONORRHOEA DNA SPEC QL PROBE+SIG AMP: NEGATIVE
NITRITE UR QL STRIP.AUTO: NEGATIVE
NRBC BLD-RTO: 0 /100 WBCS (ref 0–0)
PH UR STRIP.AUTO: 6.5 [PH]
PLATELET # BLD AUTO: 218 X10*3/UL (ref 150–450)
POTASSIUM SERPL-SCNC: 3.3 MMOL/L (ref 3.5–5.3)
PROT UR STRIP.AUTO-MCNC: ABNORMAL MG/DL
RBC # BLD AUTO: 4.29 X10*6/UL (ref 4.5–5.9)
RBC # UR STRIP.AUTO: ABNORMAL /UL
RBC #/AREA URNS AUTO: ABNORMAL /HPF
SODIUM SERPL-SCNC: 137 MMOL/L (ref 136–145)
SP GR UR STRIP.AUTO: 1.02
UROBILINOGEN UR STRIP.AUTO-MCNC: NORMAL MG/DL
WBC # BLD AUTO: 10.2 X10*3/UL (ref 4.4–11.3)
WBC #/AREA URNS AUTO: >50 /HPF
WBC CLUMPS #/AREA URNS AUTO: ABNORMAL /HPF

## 2024-05-27 PROCEDURE — 2500000002 HC RX 250 W HCPCS SELF ADMINISTERED DRUGS (ALT 637 FOR MEDICARE OP, ALT 636 FOR OP/ED): Performed by: INTERNAL MEDICINE

## 2024-05-27 PROCEDURE — 2500000001 HC RX 250 WO HCPCS SELF ADMINISTERED DRUGS (ALT 637 FOR MEDICARE OP)

## 2024-05-27 PROCEDURE — 80048 BASIC METABOLIC PNL TOTAL CA: CPT

## 2024-05-27 PROCEDURE — 81001 URINALYSIS AUTO W/SCOPE: CPT | Performed by: INTERNAL MEDICINE

## 2024-05-27 PROCEDURE — 2500000004 HC RX 250 GENERAL PHARMACY W/ HCPCS (ALT 636 FOR OP/ED): Performed by: INTERNAL MEDICINE

## 2024-05-27 PROCEDURE — 36415 COLL VENOUS BLD VENIPUNCTURE: CPT

## 2024-05-27 PROCEDURE — 83735 ASSAY OF MAGNESIUM: CPT | Performed by: INTERNAL MEDICINE

## 2024-05-27 PROCEDURE — 2500000004 HC RX 250 GENERAL PHARMACY W/ HCPCS (ALT 636 FOR OP/ED)

## 2024-05-27 PROCEDURE — 1100000001 HC PRIVATE ROOM DAILY

## 2024-05-27 PROCEDURE — 87086 URINE CULTURE/COLONY COUNT: CPT | Mod: PARLAB | Performed by: INTERNAL MEDICINE

## 2024-05-27 PROCEDURE — 85027 COMPLETE CBC AUTOMATED: CPT

## 2024-05-27 RX ORDER — POTASSIUM CHLORIDE 20 MEQ/1
40 TABLET, EXTENDED RELEASE ORAL DAILY
Status: DISCONTINUED | OUTPATIENT
Start: 2024-05-27 | End: 2024-05-28 | Stop reason: HOSPADM

## 2024-05-27 RX ORDER — SODIUM CHLORIDE, SODIUM LACTATE, POTASSIUM CHLORIDE, CALCIUM CHLORIDE 600; 310; 30; 20 MG/100ML; MG/100ML; MG/100ML; MG/100ML
100 INJECTION, SOLUTION INTRAVENOUS CONTINUOUS
Status: DISCONTINUED | OUTPATIENT
Start: 2024-05-27 | End: 2024-05-28 | Stop reason: HOSPADM

## 2024-05-27 RX ADMIN — PIPERACILLIN SODIUM AND TAZOBACTAM SODIUM 3.38 G: 3; .375 INJECTION, SOLUTION INTRAVENOUS at 17:28

## 2024-05-27 RX ADMIN — ACETAMINOPHEN 650 MG: 325 TABLET ORAL at 20:26

## 2024-05-27 RX ADMIN — PIPERACILLIN SODIUM AND TAZOBACTAM SODIUM 3.38 G: 3; .375 INJECTION, SOLUTION INTRAVENOUS at 00:01

## 2024-05-27 RX ADMIN — Medication 1 TABLET: at 20:26

## 2024-05-27 RX ADMIN — PIPERACILLIN SODIUM AND TAZOBACTAM SODIUM 3.38 G: 3; .375 INJECTION, SOLUTION INTRAVENOUS at 20:26

## 2024-05-27 RX ADMIN — PIPERACILLIN SODIUM AND TAZOBACTAM SODIUM 3.38 G: 3; .375 INJECTION, SOLUTION INTRAVENOUS at 05:46

## 2024-05-27 RX ADMIN — SODIUM CHLORIDE, POTASSIUM CHLORIDE, SODIUM LACTATE AND CALCIUM CHLORIDE 100 ML/HR: 600; 310; 30; 20 INJECTION, SOLUTION INTRAVENOUS at 10:46

## 2024-05-27 RX ADMIN — POTASSIUM CHLORIDE 40 MEQ: 1500 TABLET, EXTENDED RELEASE ORAL at 13:15

## 2024-05-27 RX ADMIN — PIPERACILLIN SODIUM AND TAZOBACTAM SODIUM 3.38 G: 3; .375 INJECTION, SOLUTION INTRAVENOUS at 10:46

## 2024-05-27 RX ADMIN — Medication 1 TABLET: at 08:05

## 2024-05-27 RX ADMIN — MORPHINE SULFATE 2 MG: 2 INJECTION, SOLUTION INTRAMUSCULAR; INTRAVENOUS at 07:54

## 2024-05-27 ASSESSMENT — COGNITIVE AND FUNCTIONAL STATUS - GENERAL
MOBILITY SCORE: 24
DAILY ACTIVITIY SCORE: 24

## 2024-05-27 ASSESSMENT — PAIN - FUNCTIONAL ASSESSMENT
PAIN_FUNCTIONAL_ASSESSMENT: 0-10

## 2024-05-27 ASSESSMENT — PAIN SCALES - GENERAL
PAINLEVEL_OUTOF10: 3
PAINLEVEL_OUTOF10: 3
PAINLEVEL_OUTOF10: 0 - NO PAIN
PAINLEVEL_OUTOF10: 7

## 2024-05-27 ASSESSMENT — PAIN DESCRIPTION - LOCATION: LOCATION: ABDOMEN

## 2024-05-27 NOTE — PROGRESS NOTES
Attending admit note/H&P H&P patient has been seen in ER by ER physician nurse practitioner appreciate help   Ingrid Maurice is a 33 y.o. male on day 0 of admission presenting with abdominal pain      Subjective   No       Objective     Last Recorded Vitals  /67 (BP Location: Left arm, Patient Position: Lying)   Pulse 60   Temp 36.3 °C (97.3 °F) (Temporal)   Resp 18   Wt 66.6 kg (146 lb 13.2 oz)   SpO2 97%   Intake/Output last 3 Shifts:    Intake/Output Summary (Last 24 hours) at 5/27/2024 0842  Last data filed at 5/27/2024 0001  Gross per 24 hour   Intake 1050 ml   Output --   Net 1050 ml       Admission Weight  Weight: 66.6 kg (146 lb 13.2 oz) (05/26/24 0315)    Daily Weight  05/26/24 : 66.6 kg (146 lb 13.2 oz)    Image Results  CT abdomen pelvis w IV contrast  Narrative: Interpreted By:  Aleks Avila,   STUDY:  CT ABDOMEN PELVIS W IV CONTRAST;  5/26/2024 7:02 am      INDICATION:  Signs/Symptoms:abdominal pain.      COMPARISON:  None.      ACCESSION NUMBER(S):  [id number]      ORDERING CLINICIAN:  MARCO DOE      TECHNIQUE:  CT of the abdomen and pelvis was performed.  Standard contiguous  axial images were obtained at 3 mm slice thickness through the  abdomen and pelvis. Coronal and sagittal reconstructions at 3 mm  slice thickness were performed.      75 cc Omnipaque 350 administered intravenously without immediate  complication.      FINDINGS:  LOWER CHEST:  The lung bases are hyperinflated.      ABDOMEN:      LIVER:  The liver is grossly unremarkable. No definite focal liver lesions.      BILE DUCTS:  No bile duct dilatation.      GALLBLADDER:  Suboptimally distended gallbladder.      PANCREAS:  Unremarkable pancreas.      SPLEEN:  Unremarkable spleen.      ADRENAL GLANDS:  No adrenal masses.      KIDNEYS AND URETERS:  Right nephrectomy. Unremarkable left kidney. No hydronephrosis or  hydroureter.      PELVIS:      BLADDER:  Grossly unremarkable.      REPRODUCTIVE ORGANS:  No definite  masses.      BOWEL:  Total colectomy with ileal anal pouch and right lower quadrant  ostomy, similar to the prior exam. No evidence of bowel obstruction.      VESSELS:  Grossly unremarkable.      PERITONEUM/RETROPERITONEUM/LYMPH NODES:  No retroperitoneal adenopathy. No ascites.      BONES AND ABDOMINAL WALL:  Right anterior abdominal ostomy. No acute osseous abnormalities.      Impression: Total colectomy with ileal anal pouch and right lower quadrant  ostomy, similar to the prior exam.No bowel obstruction.      Right nephrectomy.      No acute abnormalities.      MACRO:  None      Signed by: Aleks Avila 5/26/2024 8:04 AM  Dictation workstation:   [id number]   33 y.o. 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  His girlfriend is at the bedside.  The patient tells me the pain started suddenly a couple days ago, and has been constant since.  He states it is a dull and achy pain all over his abdomen.  Nothing makes it better, though being out in the cold air makes it worse.  He denies any radiation and rates it a 6 out of 10.  He states he has had abdominal pain before, which he experienced shortly after he had his colectomy procedure performed.  He states his output from his colostomy has been normal.  He endorses fever/chills, headache and nausea, but denies lightheadedness/dizziness, chest pain, shortness of breath, diarrhea, hematuria, urgency/frequency, dysuria, penile discharge, itching, burning of genitals.  He tells me he is sexually active and does not use protection, though he is not concerned about STIs today. He denies use of alcohol and cigarettes, but admits to using marijuana daily.     ED course: On arrival, patient has  temperature of 100.8 degrees, otherwise vital signs stable.  Labs and imaging performed, revealing potassium 3.4, creatinine normal, LFTs normal, lactate normal, lipase normal, WBC 15.4.  Urinalysis shows evidence of infection.  Urine cultures and blood cultures ordered.  CT abdomen/pelvis shows right nephrectomy, unremarkable left kidney, no hydronephrosis or hydroureter, no evidence of bowel obstruction, no other acute abnormalities.  Patient given Rocephin, 2 1000 mL NS fluid boluses, morphine, and Zofran in the ED.       Past Medical History  Medical History        Past Medical History:   Diagnosis Date    Hemorrhage of anus and rectum       Hemorrhage of rectum and anus            Surgical History  Surgical History         Past Surgical History:   Procedure Laterality Date    ABDOMINAL ADHESION SURGERY   10/14/2013     Laparoscopic Lysis Of Intestinal Adhesions    CT GUIDED PERCUTANEOUS PERITONEAL OR RETROPERITONEAL FLUID COLLECTION DRAINAGE   7/3/2018     CT GUIDED PERCUTANEOUS PERITONEAL OR RETROPERITONEAL FLUID COLLECTION DRAINAGE 7/3/2018 [facility]    EXPLORATORY LAPAROTOMY   10/14/2013     Exploratory Laparotomy    IR GI TUBE EXCHANGE   1/8/2013     IR GI TUBE EXCHANGE 1/8/2013 [facility]    IR GI TUBE EXCHANGE   3/13/2013     IR GI TUBE EXCHANGE 3/13/2013 [facility]    IR GI TUBE EXCHANGE   4/12/2013     IR GI TUBE EXCHANGE [place]/2013 [facility]    IR GI TUBE EXCHANGE   6/19/2013     IR GI TUBE EXCHANGE 6/19/2013 [facility]    IR GI TUBE EXCHANGE   7/24/2013     IR GI TUBE EXCHANGE 7/24/2013 [facility]    IR GI TUBE EXCHANGE   11/25/2013     IR GI TUBE EXCHANGE 11/25/2013 [facility]    IR GI TUBE EXCHANGE   3/6/2014     IR GI TUBE EXCHANGE 3/6/2014 [facility]    IR GI TUBE EXCHANGE   5/2/2014     IR GI TUBE EXCHANGE 5/2/2014 [facility]    IR GI TUBE EXCHANGE   6/9/2014     IR GI TUBE EXCHANGE 6/9/2014 [facility]    IR  GI TUBE EXCHANGE   7/14/2014     IR GI TUBE EXCHANGE 7/14/2014 [facility]    IR GI TUBE EXCHANGE   8/18/2014     IR GI TUBE EXCHANGE 8/18/2014 [facility]    IR GI TUBE EXCHANGE   10/7/2014     IR GI TUBE EXCHANGE 10/7/2014 [facility]    IR GI TUBE EXCHANGE   11/30/2014     IR GI TUBE EXCHANGE 11/30/2014 [facility]    IR GI TUBE EXCHANGE   12/30/2014     IR GI TUBE EXCHANGE 12/30/2014 [facility]    IR GI TUBE EXCHANGE   4/8/2015     IR GI TUBE EXCHANGE 4/8/2015 [facility]    IR GI TUBE EXCHANGE   6/5/2015     IR GI TUBE EXCHANGE 6/5/2015 [facility]    IR GI TUBE EXCHANGE   8/3/2015     IR GI TUBE EXCHANGE 8/3/2015 [facility]    IR GI TUBE EXCHANGE   11/25/2015     IR GI TUBE EXCHANGE 11/25/2015 [facility]    IR GI TUBE EXCHANGE   12/28/2015     IR GI TUBE EXCHANGE 12/28/2015 [facility]    IR GI TUBE EXCHANGE   3/21/2012     IR GI TUBE EXCHANGE 3/21/2012 [facility]    IR GI TUBE EXCHANGE   5/24/2012     IR GI TUBE EXCHANGE 5/24/2012 [facility]    IR GI TUBE EXCHANGE   8/7/2012     IR GI TUBE EXCHANGE 8/7/2012 [facility]    IR GI TUBE EXCHANGE   9/5/2012     IR GI TUBE EXCHANGE 9/5/2012 [facility]    IR GI TUBE EXCHANGE   11/5/2012     IR GI TUBE EXCHANGE 11/5/2012 [facility]    IR NEPHROSTOMY TUBE EXCHANGE   1/8/2013     IR NEPHROSTOMY TUBE EXCHANGE 1/8/2013 [facility]    IR NEPHROSTOMY TUBE EXCHANGE   3/13/2013     IR NEPHROSTOMY TUBE EXCHANGE 3/13/2013 [facility]    IR NEPHROSTOMY TUBE EXCHANGE   4/12/2013     IR NEPHROSTOMY TUBE EXCHANGE 4/12/2013 [facility]    IR NEPHROSTOMY TUBE EXCHANGE   6/19/2013     IR NEPHROSTOMY TUBE EXCHANGE 6/19/2013 [facility]    IR NEPHROSTOMY TUBE EXCHANGE   7/24/2013     IR NEPHROSTOMY TUBE EXCHANGE 7/24/2013 [facility]    IR NEPHROSTOMY TUBE EXCHANGE   11/25/2013     IR NEPHROSTOMY TUBE EXCHANGE 11/25/2013 [facility]    IR  NEPHROSTOMY TUBE EXCHANGE   3/6/2014     IR NEPHROSTOMY TUBE EXCHANGE 3/6/2014 [facility]    IR NEPHROSTOMY TUBE EXCHANGE   5/2/2014     IR NEPHROSTOMY TUBE EXCHANGE 5/2/2014 [facility]    IR NEPHROSTOMY TUBE EXCHANGE   6/9/2014     IR NEPHROSTOMY TUBE EXCHANGE 6/9/2014 [facility]    IR NEPHROSTOMY TUBE EXCHANGE   7/14/2014     IR NEPHROSTOMY TUBE EXCHANGE 7/14/2014 [facility]    IR NEPHROSTOMY TUBE EXCHANGE   8/18/2014     IR NEPHROSTOMY TUBE EXCHANGE 8/18/2014 [facility]    IR NEPHROSTOMY TUBE EXCHANGE   10/7/2014     IR NEPHROSTOMY TUBE EXCHANGE 10/7/2014 [facility]    IR NEPHROSTOMY TUBE EXCHANGE   11/30/2014     IR NEPHROSTOMY TUBE EXCHANGE 11/30/2014 [facility]    IR NEPHROSTOMY TUBE EXCHANGE   12/30/2014     IR NEPHROSTOMY TUBE EXCHANGE 12/30/2014 [facility]    IR NEPHROSTOMY TUBE EXCHANGE   4/8/2015     IR NEPHROSTOMY TUBE EXCHANGE 4/8/2015 [facility]    IR NEPHROSTOMY TUBE EXCHANGE   6/5/2015     IR NEPHROSTOMY TUBE EXCHANGE 6/5/2015 [facility]    IR NEPHROSTOMY TUBE EXCHANGE   8/3/2015     IR NEPHROSTOMY TUBE EXCHANGE 8/3/2015 [facility]    IR NEPHROSTOMY TUBE EXCHANGE   10/1/2015     IR NEPHROSTOMY TUBE EXCHANGE 10/1/2015 [facility]    IR NEPHROSTOMY TUBE EXCHANGE   11/25/2015     IR NEPHROSTOMY TUBE EXCHANGE 11/25/2015 [facility]    IR NEPHROSTOMY TUBE EXCHANGE   12/28/2015     IR NEPHROSTOMY TUBE EXCHANGE 12/28/2015 [facility]    IR NEPHROSTOMY TUBE EXCHANGE   1/28/2016     IR NEPHROSTOMY TUBE EXCHANGE 1/28/2016 [facility]    IR NEPHROSTOMY TUBE EXCHANGE   1/10/2018     IR NEPHROSTOMY TUBE EXCHANGE 1/10/2018 [facility]    IR NEPHROSTOMY TUBE EXCHANGE   4/30/2018     IR NEPHROSTOMY TUBE EXCHANGE 4/30/2018 [facility]    IR NEPHROSTOMY TUBE EXCHANGE   5/22/2018     IR NEPHROSTOMY TUBE EXCHANGE 5/22/2018 [facility]    IR NEPHROSTOMY TUBE EXCHANGE   1/31/2017     IR NEPHROSTOMY TUBE  EXCHANGE 1/31/2017 [facility]    IR NEPHROSTOMY TUBE EXCHANGE   3/15/2017     IR NEPHROSTOMY TUBE EXCHANGE 3/15/2017 [facility]    IR NEPHROSTOMY TUBE EXCHANGE   4/19/2017     IR NEPHROSTOMY TUBE EXCHANGE 4/19/2017 [facility]    IR NEPHROSTOMY TUBE EXCHANGE   5/18/2017     IR NEPHROSTOMY TUBE EXCHANGE 5/18/2017 [facility]    IR NEPHROSTOMY TUBE EXCHANGE   6/29/2017     IR NEPHROSTOMY TUBE EXCHANGE 6/29/2017 [facility]    IR NEPHROSTOMY TUBE EXCHANGE   8/17/2017     IR NEPHROSTOMY TUBE EXCHANGE 8/17/2017 [facility]    IR NEPHROSTOMY TUBE EXCHANGE   10/13/2017     IR NEPHROSTOMY TUBE EXCHANGE 10/13/2017 [facility]    IR NEPHROSTOMY TUBE EXCHANGE   3/21/2012     IR NEPHROSTOMY TUBE EXCHANGE 3/21/2012 [facility]    IR NEPHROSTOMY TUBE EXCHANGE   5/24/2012     IR NEPHROSTOMY TUBE EXCHANGE 5/24/2012 [facility]    IR NEPHROSTOMY TUBE EXCHANGE   8/7/2012     IR NEPHROSTOMY TUBE EXCHANGE 8/7/2012 [facility]    IR NEPHROSTOMY TUBE EXCHANGE   9/5/2012     IR NEPHROSTOMY TUBE EXCHANGE 9/5/2012 [facility]    IR NEPHROSTOMY TUBE EXCHANGE   11/5/2012     IR NEPHROSTOMY TUBE EXCHANGE 11/5/2012 [facility]    OTHER SURGICAL HISTORY   10/14/2013     Laparos Total Colectomy W/ Proctectomy, Ileoanal Anastomosis    OTHER SURGICAL HISTORY   10/14/2013     Kock Pouch Endoscopy    OTHER SURGICAL HISTORY   10/14/2013     Ileostomy Non-Tube    OTHER SURGICAL HISTORY   10/14/2013     Ileostomy Revision Complex    US GUIDED PERCUTANEOUS PERITONEAL OR RETROPERITONEAL FLUID COLLECTION DRAINAGE   1/2/2015     US GUIDED PERCUTANEOUS PERITONEAL OR RETROPERITONEAL FLUID COLLECTION DRAINAGE [place]2/2015 [facility]            Social History  He reports that he has never smoked. He has never used smokeless tobacco. No history on file for alcohol use and drug use.     Family History  Family History   No family history on file.        Allergies  Coconut and Hydrocodone-acetaminophen      Review of Systems   Constitutional:  Positive for chills and fever.   HENT:  Negative for congestion, rhinorrhea and sore throat.    Respiratory:  Positive for wheezing. Negative for cough and shortness of breath.    Cardiovascular:  Negative for chest pain, palpitations and leg swelling.   Gastrointestinal:  Positive for abdominal pain and nausea. Negative for abdominal distention, blood in stool, constipation, diarrhea and vomiting.   Genitourinary:  Negative for difficulty urinating, dysuria, frequency, hematuria, penile discharge, penile pain, penile swelling, testicular pain and urgency.   Neurological:  Negative for dizziness, weakness and light-headedness.         Physical Exam 5/27/2024  Constitutional:       Appearance:He is not toxic-appearing.      Comments: Patient says he feels improved since admission but not back to baseline.    HENT:      Head: Normocephalic and atraumatic.      Nose: Nose normal.      Mouth/Throat:      Mouth: Mucous membranes are moist.      Pharynx: Oropharynx is clear.   Eyes:      Extraocular Movements: Extraocular movements intact.      Conjunctiva/sclera: Conjunctivae normal.      Pupils: Pupils are equal, round, and reactive to light.   Cardiovascular:      Rate and Rhythm: Normal rate and regular rhythm.      Pulses: Normal pulses.   Pulmonary:      Effort: Pulmonary effort is normal. No respiratory distress.      Breath sounds: Clear to auscultation bilaterally   abdominal:      General: Abdomen is flat. Bowel sounds are normal.      Palpations: Abdomen is soft.      Tenderness: There is no abdominal tenderness.  No guarding or rebound  Musculoskeletal:         General: Normal range of motion.      Cervical back: Normal range of motion.      Right lower leg: No edema.      Left lower leg: No edema.   Skin:     General: Skin is warm and dry.   Neurological:      General: No focal deficit present.      Mental Status: He is oriented to person, place, and time.   Psychiatric:  "        Mood and Affect: Mood normal.         Behavior: Behavior normal.         Thought Content: Thought content normal.            Last Recorded Vitals  Blood pressure 119/73, pulse 89, temperature 37.8 °C (100 °F), temperature source Temporal, resp. rate 16, height 1.702 m (5' 7\"), weight 66.6 kg (146 lb 13.2 oz), SpO2 96%.     Relevant Results on admission           Results for orders placed or performed during the hospital encounter of 05/26/24 (from the past 24 hour(s))   CBC and Auto Differential   Result Value Ref Range     WBC 15.4 (H) 4.4 - 11.3 x10*3/uL     nRBC 0.0 0.0 - 0.0 /100 WBCs     RBC 4.54 4.50 - 5.90 x10*6/uL     Hemoglobin 13.5 13.5 - 17.5 g/dL     Hematocrit 36.2 (L) 41.0 - 52.0 %     MCV 80 80 - 100 fL     MCH 29.7 26.0 - 34.0 pg     MCHC 37.3 (H) 32.0 - 36.0 g/dL     RDW 13.2 11.5 - 14.5 %     Platelets 212 150 - 450 x10*3/uL     Neutrophils % 88.2 40.0 - 80.0 %     Immature Granulocytes %, Automated 0.4 0.0 - 0.9 %     Lymphocytes % 5.3 13.0 - 44.0 %     Monocytes % 5.6 2.0 - 10.0 %     Eosinophils % 0.2 0.0 - 6.0 %     Basophils % 0.3 0.0 - 2.0 %     Neutrophils Absolute 13.56 (H) 1.20 - 7.70 x10*3/uL     Immature Granulocytes Absolute, Automated 0.06 0.00 - 0.70 x10*3/uL     Lymphocytes Absolute 0.81 (L) 1.20 - 4.80 x10*3/uL     Monocytes Absolute 0.86 0.10 - 1.00 x10*3/uL     Eosinophils Absolute 0.03 0.00 - 0.70 x10*3/uL     Basophils Absolute 0.05 0.00 - 0.10 x10*3/uL   Comprehensive metabolic panel   Result Value Ref Range     Glucose 94 74 - 99 mg/dL     Sodium 136 136 - 145 mmol/L     Potassium 3.4 (L) 3.5 - 5.3 mmol/L     Chloride 105 98 - 107 mmol/L     Bicarbonate 23 21 - 32 mmol/L     Anion Gap 11 10 - 20 mmol/L     Urea Nitrogen 5 (L) 6 - 23 mg/dL     Creatinine 1.23 0.50 - 1.30 mg/dL     eGFR 80 >60 mL/min/1.73m*2     Calcium 9.0 8.6 - 10.3 mg/dL     Albumin 4.0 3.4 - 5.0 g/dL     Alkaline Phosphatase 56 33 - 120 U/L     Total Protein 6.5 6.4 - 8.2 g/dL     AST 21 9 - 39 U/L "     Bilirubin, Total 0.6 0.0 - 1.2 mg/dL     ALT 18 10 - 52 U/L   Lipase   Result Value Ref Range     Lipase 30 9 - 82 U/L   Lactate   Result Value Ref Range     Lactate 1.1 0.4 - 2.0 mmol/L   Urinalysis with Reflex Culture and Microscopic   Result Value Ref Range     Color, Urine Yellow Light-Yellow, Yellow, Dark-Yellow     Appearance, Urine Clear Clear     Specific Gravity, Urine 1.018 1.005 - 1.035     pH, Urine 6.0 5.0, 5.5, 6.0, 6.5, 7.0, 7.5, 8.0     Protein, Urine 20 (TRACE) NEGATIVE, 10 (TRACE), 20 (TRACE) mg/dL     Glucose, Urine Normal Normal mg/dL     Blood, Urine 0.03 (TRACE) (A) NEGATIVE     Ketones, Urine NEGATIVE NEGATIVE mg/dL     Bilirubin, Urine NEGATIVE NEGATIVE     Urobilinogen, Urine Normal Normal mg/dL     Nitrite, Urine NEGATIVE NEGATIVE     Leukocyte Esterase, Urine 250 Alice/µL (A) NEGATIVE   Microscopic Only, Urine   Result Value Ref Range     WBC, Urine 21-50 (A) 1-5, NONE /HPF     WBC Clumps, Urine RARE Reference range not established. /HPF     RBC, Urine 3-5 NONE, 1-2, 3-5 /HPF     Mucus, Urine FEW Reference range not established. /LPF      CT abdomen pelvis w IV contrast     Result Date: 5/26/2024  Interpreted By:  Aleks Avila, STUDY: CT ABDOMEN PELVIS W IV CONTRAST;  5/26/2024 7:02 am   INDICATION: Signs/Symptoms:abdominal pain.   COMPARISON: None.   ACCESSION NUMBER(S): [id number]   ORDERING CLINICIAN: MARCO DOE   TECHNIQUE: CT of the abdomen and pelvis was performed.  Standard contiguous axial images were obtained at 3 mm slice thickness through the abdomen and pelvis. Coronal and sagittal reconstructions at 3 mm slice thickness were performed.   75 cc Omnipaque 350 administered intravenously without immediate complication.   FINDINGS: LOWER CHEST: The lung bases are hyperinflated.   ABDOMEN:   LIVER: The liver is grossly unremarkable. No definite focal liver lesions.   BILE DUCTS: No bile duct dilatation.   GALLBLADDER: Suboptimally distended gallbladder.   PANCREAS:  Unremarkable pancreas.   SPLEEN: Unremarkable spleen.   ADRENAL GLANDS: No adrenal masses.   KIDNEYS AND URETERS: Right nephrectomy. Unremarkable left kidney. No hydronephrosis or hydroureter.   PELVIS:   BLADDER: Grossly unremarkable.   REPRODUCTIVE ORGANS: No definite masses.   BOWEL: Total colectomy with ileal anal pouch and right lower quadrant ostomy, similar to the prior exam. No evidence of bowel obstruction.   VESSELS: Grossly unremarkable.   PERITONEUM/RETROPERITONEUM/LYMPH NODES: No retroperitoneal adenopathy. No ascites.   BONES AND ABDOMINAL WALL: Right anterior abdominal ostomy. No acute osseous abnormalities.        Total colectomy with ileal anal pouch and right lower quadrant ostomy, similar to the prior exam.No bowel obstruction.   Right nephrectomy.   No acute abnormalities.   MACRO: None   Signed by: Aleks Avila 5/26/2024 8:04 AM Dictation workstation:   [id number]      5/27/2024 Patient currently feels better, his abdominal pain was acute on chronic chronic issue [place] stopped [place] for a while and says he is dealing with it is acute presentation is greatly improved he received antibiotics blood cultures pending he also received morphine IV x 2 so far ;  patient unfortunately he developed worsening renal function with his presentation he has a solitary kidney status post nephrectomy on the right at this point we are unable to discharge him he is complex patient will continue with fluid challenge continue antibiotic pending cultures '  His potassium is also low again even after appropriate supplementation yesterday patient partial responder and not able to be discharged within 24 hours for this condition will admit.     Assessment/Plan   Principal Problem:  Abdominal pain      Suspected sepsis hemodynamically stable currently  With worsening renal function and single kidney/FABRICIO  Urinary tract infection  Leukocytosis  -Urology consult and  recommendations appreciated  -UA shows evidence of infection, urine culture pending  -IV Rocephin initiated in the ED,   Changed to Zosyn as history of ESBL in the past infectious disease consulted as well repeat cultures  -IV fluids continued  -Following blood cultures  -Acetaminophen as needed for pain and fever  -Morphine as needed for pain control  -Pt can have Zofran once EKG comes back  -Postvoid residual ordered     Hypokalemia  -Potassium 3.3 today  -40 mg potassium replacement given  Give another dose    FABRICIO with single kidney worsening renal function unfortunately cannot discharge will continue with IV fluids fluid challenge to repeat labs in the morning of worsening nephrology consultation will be given    -Monitor with morning BMP, Mg     DVT prophylaxis  -Lovenox  -SCDs     Chronic conditions: 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

## 2024-05-27 NOTE — CONSULTS
Consults  Referring physician Dr. Ngo  History of present illness    The pleasant 33-year-old male who has a history of familial adenomatosis polyposis and any status post proctocolectomy and end ileostomy and J-pouch.  Patient came in with abdominal pain and fever.  He was found to have UTI and the urine analysis the cultures just showed mixed jerson.  I called for further antibiotic management    Patient states he always has pain but it is not any worse than it was    Past medical history familial adenomatosis polyposis Faust syndrome status post proctocolectomy\end ileostomy and J-pouch, hydronephrosis status post right nephrectomy 2018 mesenteric desmoid tumor, abdominal fibromatosis, nephrostomy tubes with revision, bipolar disorder  Past surgical history as above and patient has had multiple nephrostomyTubes placed as per history and physical  Allergies known antibiotic allergies  Social history no drinking smoking or drug use  Family history noncontributory  A 10 point review system was obtained with the patient denying any complaints outside of those listed in the HPI above    Physical exam  HEENT PERRLA  Chest  fair air entry bilaterally  CVS S1-S2 regular  Abdomen soft nontender positive bowel sounds  Extremities possibles bilaterally no pitting edema  Labs and radiology reviewed    Impression:  33-year-old male with multiple medical problems now with a UTI.  The cultures did not grow anything except mixed jerson but the UA was consistent with a UTI.  At this time will recommend the following    Suggestion:  1.  While the patient is in the hospital would continue Zosyn but once he is discharged he can go on Bactrim 1 double strength tablet p.o. twice daily to complete 5 more days, total of 7-day treatment course    Thank for this consult follow with you as needed    I have reviewed and interpreted all lab test imaging studies and documentations from other healthcare providers  I am monitoring  antibiotics for side effects, toxicity

## 2024-05-27 NOTE — CARE PLAN
Problem: Pain  Goal: My pain/discomfort is manageable  Outcome: Progressing     Problem: Skin  Goal: Prevent/minimize sheer/friction injuries  Outcome: Progressing

## 2024-05-27 NOTE — CARE PLAN
The patient's goals for the shift include  pain free    The clinical goals for the shift include patient's pain will decrease for the remainder of the shift

## 2024-05-27 NOTE — PROGRESS NOTES
05/27/24 1202   Discharge Planning   Living Arrangements Alone   Support Systems None   Assistance Needed none   Type of Residence Private residence   Patient expects to be discharged to: home     5/27/2024  Met with pt in room, introduced self and explained role.  Verified insurance, address correct phone is [phone].  PCP- Kim Manrique   No difficulties getting medications or paying for them.  Pt is from home, lives alone. Stated he is independent, no use of any assistive devices.  Performs own ADL's.  Does not drive, but states he does have transportation to appointments, shopping. Stated has no support form family or friends.  Does not feel he will have any needs upon discharge. Ct Team will follow.   Sarahy Daigle RN [facility]

## 2024-05-28 VITALS
TEMPERATURE: 97.7 F | SYSTOLIC BLOOD PRESSURE: 110 MMHG | WEIGHT: 146.83 LBS | HEIGHT: 67 IN | OXYGEN SATURATION: 98 % | DIASTOLIC BLOOD PRESSURE: 76 MMHG | RESPIRATION RATE: 18 BRPM | BODY MASS INDEX: 23.04 KG/M2 | HEART RATE: 62 BPM

## 2024-05-28 LAB
ANION GAP SERPL CALC-SCNC: 13 MMOL/L (ref 10–20)
BASOPHILS # BLD AUTO: 0.05 X10*3/UL (ref 0–0.1)
BASOPHILS NFR BLD AUTO: 0.5 %
BUN SERPL-MCNC: 7 MG/DL (ref 6–23)
CALCIUM SERPL-MCNC: 9.2 MG/DL (ref 8.6–10.3)
CHLORIDE SERPL-SCNC: 103 MMOL/L (ref 98–107)
CO2 SERPL-SCNC: 25 MMOL/L (ref 21–32)
CREAT SERPL-MCNC: 1.39 MG/DL (ref 0.5–1.3)
CREAT UR-MCNC: 254.8 MG/DL (ref 20–370)
EGFRCR SERPLBLD CKD-EPI 2021: 69 ML/MIN/1.73M*2
EOSINOPHIL # BLD AUTO: 0.15 X10*3/UL (ref 0–0.7)
EOSINOPHIL NFR BLD AUTO: 1.5 %
ERYTHROCYTE [DISTWIDTH] IN BLOOD BY AUTOMATED COUNT: 13.7 % (ref 11.5–14.5)
GLUCOSE SERPL-MCNC: 74 MG/DL (ref 74–99)
HCT VFR BLD AUTO: 40.1 % (ref 41–52)
HGB BLD-MCNC: 14.1 G/DL (ref 13.5–17.5)
IMM GRANULOCYTES # BLD AUTO: 0.03 X10*3/UL (ref 0–0.7)
IMM GRANULOCYTES NFR BLD AUTO: 0.3 % (ref 0–0.9)
LYMPHOCYTES # BLD AUTO: 2.09 X10*3/UL (ref 1.2–4.8)
LYMPHOCYTES NFR BLD AUTO: 21.4 %
MCH RBC QN AUTO: 28.7 PG (ref 26–34)
MCHC RBC AUTO-ENTMCNC: 35.2 G/DL (ref 32–36)
MCV RBC AUTO: 82 FL (ref 80–100)
MICROALBUMIN UR-MCNC: 61.9 MG/L
MICROALBUMIN/CREAT UR: 24.3 UG/MG CREAT
MONOCYTES # BLD AUTO: 1.39 X10*3/UL (ref 0.1–1)
MONOCYTES NFR BLD AUTO: 14.2 %
NEUTROPHILS # BLD AUTO: 6.07 X10*3/UL (ref 1.2–7.7)
NEUTROPHILS NFR BLD AUTO: 62.1 %
NRBC BLD-RTO: 0 /100 WBCS (ref 0–0)
PLATELET # BLD AUTO: 234 X10*3/UL (ref 150–450)
POTASSIUM SERPL-SCNC: 3.3 MMOL/L (ref 3.5–5.3)
RBC # BLD AUTO: 4.91 X10*6/UL (ref 4.5–5.9)
SODIUM SERPL-SCNC: 138 MMOL/L (ref 136–145)
URATE SERPL-MCNC: 1.5 MG/DL (ref 4–7.5)
WBC # BLD AUTO: 9.8 X10*3/UL (ref 4.4–11.3)

## 2024-05-28 PROCEDURE — 87491 CHLMYD TRACH DNA AMP PROBE: CPT | Mod: PARLAB

## 2024-05-28 PROCEDURE — 80048 BASIC METABOLIC PNL TOTAL CA: CPT | Performed by: INTERNAL MEDICINE

## 2024-05-28 PROCEDURE — 85025 COMPLETE CBC W/AUTO DIFF WBC: CPT | Performed by: INTERNAL MEDICINE

## 2024-05-28 PROCEDURE — 2500000002 HC RX 250 W HCPCS SELF ADMINISTERED DRUGS (ALT 637 FOR MEDICARE OP, ALT 636 FOR OP/ED): Performed by: INTERNAL MEDICINE

## 2024-05-28 PROCEDURE — 82043 UR ALBUMIN QUANTITATIVE: CPT | Performed by: INTERNAL MEDICINE

## 2024-05-28 PROCEDURE — 2500000001 HC RX 250 WO HCPCS SELF ADMINISTERED DRUGS (ALT 637 FOR MEDICARE OP)

## 2024-05-28 PROCEDURE — 2500000001 HC RX 250 WO HCPCS SELF ADMINISTERED DRUGS (ALT 637 FOR MEDICARE OP): Performed by: INTERNAL MEDICINE

## 2024-05-28 PROCEDURE — 2500000004 HC RX 250 GENERAL PHARMACY W/ HCPCS (ALT 636 FOR OP/ED)

## 2024-05-28 PROCEDURE — 36415 COLL VENOUS BLD VENIPUNCTURE: CPT | Performed by: INTERNAL MEDICINE

## 2024-05-28 PROCEDURE — 84550 ASSAY OF BLOOD/URIC ACID: CPT | Performed by: INTERNAL MEDICINE

## 2024-05-28 PROCEDURE — 82507 ASSAY OF CITRATE: CPT | Performed by: INTERNAL MEDICINE

## 2024-05-28 RX ORDER — POTASSIUM CHLORIDE 1.5 G/1.58G
20 POWDER, FOR SOLUTION ORAL 2 TIMES DAILY
Status: DISCONTINUED | OUTPATIENT
Start: 2024-05-28 | End: 2024-05-28 | Stop reason: HOSPADM

## 2024-05-28 RX ORDER — LEVOFLOXACIN 500 MG/1
250 TABLET, FILM COATED ORAL DAILY
Qty: 4 TABLET | Refills: 0 | Status: SHIPPED | OUTPATIENT
Start: 2024-05-28 | End: 2024-06-09 | Stop reason: HOSPADM

## 2024-05-28 RX ADMIN — PIPERACILLIN SODIUM AND TAZOBACTAM SODIUM 3.38 G: 3; .375 INJECTION, SOLUTION INTRAVENOUS at 10:00

## 2024-05-28 RX ADMIN — Medication 1 TABLET: at 09:05

## 2024-05-28 RX ADMIN — PIPERACILLIN SODIUM AND TAZOBACTAM SODIUM 3.38 G: 3; .375 INJECTION, SOLUTION INTRAVENOUS at 04:02

## 2024-05-28 RX ADMIN — POTASSIUM CHLORIDE 20 MEQ: 1.5 POWDER, FOR SOLUTION ORAL at 11:09

## 2024-05-28 RX ADMIN — POTASSIUM CHLORIDE 40 MEQ: 1500 TABLET, EXTENDED RELEASE ORAL at 09:05

## 2024-05-28 ASSESSMENT — PAIN SCALES - GENERAL: PAINLEVEL_OUTOF10: 0 - NO PAIN

## 2024-05-28 NOTE — DISCHARGE SUMMARY
Discharge Diagnosis  Chest pain    Issues Requiring Follow-Up  Patient fully evaluated on May 28. Patient improved, urine culture negative. Medically cleared for discharge today on antibiotics. Follow up with urology.     Discharge Meds     Your medication list        START taking these medications        Instructions Last Dose Given Next Dose Due   levoFLOXacin 500 mg tablet  Commonly known as: Levaquin      Take 0.5 tablets (250 mg) by mouth once daily for 7 days.                 Where to Get Your Medications        These medications were sent to [facility]  [address], [address], [facility] [address]      Hours: 8AM to 6PM Mon-Fri, 8AM to 4PM Sat, 9AM to 1PM Sun Phone: [phone]   levoFLOXacin 500 mg tablet         Test Results Pending At Discharge  Pending Labs       Order Current Status    Urine Culture In process    Blood Culture Preliminary result    Blood Culture Preliminary result            Hospital Course         Beni Ngo MD   Physician  Internal Medicine     Progress Notes      Signed     Date of Service: 5/27/2024  8:42 AM     Signed       Expand All Collapse All    Attending admit note/H&P H&P patient has been seen in ER by ER physician nurse practitioner appreciate help   Conynttrent Maurice is a 33 y.o. male on day 0 of admission presenting with abdominal pain           Subjective   No              Objective []Expand by Default  Last Recorded Vitals  /67 (BP Location: Left arm, Patient Position: Lying)   Pulse 60   Temp 36.3 °C (97.3 °F) (Temporal)   Resp 18   Wt 66.6 kg (146 lb 13.2 oz)   SpO2 97%   Intake/Output last 3 Shifts:     Intake/Output Summary (Last 24 hours) at 5/27/2024 0842  Last data filed at 5/27/2024 0001      Gross per 24 hour   Intake 1050 ml   Output --   Net 1050 ml         Admission Weight  Weight: 66.6 kg (146 lb 13.2 oz) (05/26/24 0315)     Daily Weight  05/26/24 : 66.6 kg (146 lb 13.2 oz)     Image Results  CT abdomen pelvis w IV  contrast  Narrative: Interpreted By:  Aleks Avila,   STUDY:  CT ABDOMEN PELVIS W IV CONTRAST;  5/26/2024 7:02 am      INDICATION:  Signs/Symptoms:abdominal pain.      COMPARISON:  None.      ACCESSION NUMBER(S):  [id number]      ORDERING CLINICIAN:  MARCO DOE      TECHNIQUE:  CT of the abdomen and pelvis was performed.  Standard contiguous  axial images were obtained at 3 mm slice thickness through the  abdomen and pelvis. Coronal and sagittal reconstructions at 3 mm  slice thickness were performed.      75 cc Omnipaque 350 administered intravenously without immediate  complication.      FINDINGS:  LOWER CHEST:  The lung bases are hyperinflated.      ABDOMEN:      LIVER:  The liver is grossly unremarkable. No definite focal liver lesions.      BILE DUCTS:  No bile duct dilatation.      GALLBLADDER:  Suboptimally distended gallbladder.      PANCREAS:  Unremarkable pancreas.      SPLEEN:  Unremarkable spleen.      ADRENAL GLANDS:  No adrenal masses.      KIDNEYS AND URETERS:  Right nephrectomy. Unremarkable left kidney. No hydronephrosis or  hydroureter.      PELVIS:      BLADDER:  Grossly unremarkable.      REPRODUCTIVE ORGANS:  No definite masses.      BOWEL:  Total colectomy with ileal anal pouch and right lower quadrant  ostomy, similar to the prior exam. No evidence of bowel obstruction.      VESSELS:  Grossly unremarkable.      PERITONEUM/RETROPERITONEUM/LYMPH NODES:  No retroperitoneal adenopathy. No ascites.      BONES AND ABDOMINAL WALL:  Right anterior abdominal ostomy. No acute osseous abnormalities.      Impression: Total colectomy with ileal anal pouch and right lower quadrant  ostomy, similar to the prior exam.No bowel obstruction.      Right nephrectomy.      No acute abnormalities.      MACRO:  None      Signed by: lAeks Avila 5/26/2024 8:04 AM  Dictation workstation:   [id number]   33 y.o. 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  His girlfriend is at the bedside.  The patient tells me the pain started suddenly a couple days ago, and has been constant since.  He states it is a dull and achy pain all over his abdomen.  Nothing makes it better, though being out in the cold air makes it worse.  He denies any radiation and rates it a 6 out of 10.  He states he has had abdominal pain before, which he experienced shortly after he had his colectomy procedure performed.  He states his output from his colostomy has been normal.  He endorses fever/chills, headache and nausea, but denies lightheadedness/dizziness, chest pain, shortness of breath, diarrhea, hematuria, urgency/frequency, dysuria, penile discharge, itching, burning of genitals.  He tells me he is sexually active and does not use protection, though he is not concerned about STIs today. He denies use of alcohol and cigarettes, but admits to using marijuana daily.     ED course: On arrival, patient has temperature of 100.8 degrees, otherwise vital signs stable.  Labs and imaging performed, revealing potassium 3.4, creatinine normal, LFTs normal, lactate normal, lipase normal, WBC 15.4.  Urinalysis shows evidence of infection.  Urine cultures and blood cultures ordered.  CT abdomen/pelvis shows right nephrectomy, unremarkable left kidney, no hydronephrosis or hydroureter, no evidence of bowel obstruction, no other acute abnormalities.  Patient given Rocephin, 2 1000 mL NS fluid boluses, morphine, and Zofran in the ED.       Past Medical History  Medical History           Past Medical History:   Diagnosis Date    Hemorrhage of anus and rectum       Hemorrhage of rectum and anus            Surgical History  Surgical History             Past Surgical History:   Procedure Laterality  Date    ABDOMINAL ADHESION SURGERY   10/14/2013     Laparoscopic Lysis Of Intestinal Adhesions    CT GUIDED PERCUTANEOUS PERITONEAL OR RETROPERITONEAL FLUID COLLECTION DRAINAGE   7/3/2018     CT GUIDED PERCUTANEOUS PERITONEAL OR RETROPERITONEAL FLUID COLLECTION DRAINAGE 7/3/2018 [facility] CLINICAL LEGACY    EXPLORATORY LAPAROTOMY   10/14/2013     Exploratory Laparotomy    IR GI TUBE EXCHANGE   1/8/2013     IR GI TUBE EXCHANGE 1/8/2013 [facility]IB LEGACY    IR GI TUBE EXCHANGE   3/13/2013     IR GI TUBE EXCHANGE 3/13/2013 [facility] CLINICAL LEGACY    IR GI TUBE EXCHANGE   4/12/2013     IR GI TUBE EXCHANGE 4/12/2013 [facility] EMERGENCY LEGACY    IR GI TUBE EXCHANGE   6/19/2013     IR GI TUBE EXCHANGE 6/19/2013 [facility] CLINICAL LEGACY    IR GI TUBE EXCHANGE   7/24/2013     IR GI TUBE EXCHANGE 7/24/2013 [facility] CLINICAL LEGACY    IR GI TUBE EXCHANGE   11/25/2013     IR GI TUBE EXCHANGE 11/25/2013 [facility] CLINICAL LEGACY    IR GI TUBE EXCHANGE   3/6/2014     IR GI TUBE EXCHANGE 3/6/2014 [facility] CLINICAL LEGACY    IR GI TUBE EXCHANGE   5/2/2014     IR GI TUBE EXCHANGE 5/2/2014 [facility] CLINICAL LEGACY    IR GI TUBE EXCHANGE   6/9/2014     IR GI TUBE EXCHANGE 6/9/2014 [facility]IB [facility]GAC[facility]    IR GI TUBE EXCHANGE   7/14/2014     IR GI TUBE EXCHANGE 7/14/2014 [facility] CLINICAL LEGACY    IR GI TUBE EXCHANGE   8/18/2014     IR GI TUBE EXCHANGE 8/18/2014 [facility] CLINICAL LEGACY    IR GI TUBE EXCHANGE   10/7/2014     IR GI TUBE EXCHANGE 10/7/2014 [facility]    IR GI TUBE EXCHANGE   11/30/2014     IR GI TUBE EXCHANGE 11/30/2014 [facility] CLINICAL LEGACY    IR GI TUBE EXCHANGE   12/30/2014     IR GI TUBE EXCHANGE 12/30/2014 [facility]    IR GI TUBE EXCHANGE   4/8/2015     IR GI TUBE EXCHANGE 4/8/2015 [facility]    IR GI TUBE EXCHANGE   6/5/2015     IR GI TUBE EXCHANGE 6/5/2015 [facility]    IR GI TUBE EXCHANGE   8/3/2015     IR GI TUBE EXCHANGE 8/3/2015 [facility] CLINICAL LEGACY    IR GI TUBE EXCHANGE   11/25/2015     IR GI TUBE EXCHANGE 11/25/2015 [facility]    IR GI  TUBE EXCHANGE   12/28/2015     IR GI TUBE EXCHANGE 12/28/2015 [facility] CLINICAL LEGACY    IR GI TUBE EXCHANGE   3/21/2012     IR GI TUBE EXCHANGE 3/21/2012 [facility]    IR GI TUBE EXCHANGE   5/24/2012     IR GI TUBE EXCHANGE 5/24/2012 [facility] INPATIENT LEGACY    IR GI TUBE EXCHANGE   8/7/2012     IR GI TUBE EXCHANGE 8/7/2012 [facility]    IR GI TUBE EXCHANGE   9/5/2012     IR GI TUBE EXCHANGE 9/5/2012 [facility]    IR GI TUBE EXCHANGE   11/5/2012     IR GI TUBE EXCHANGE 11/5/2012 [facility] CLINICAL LEGACY    IR NEPHROSTOMY TUBE EXCHANGE   1/8/2013     IR NEPHROSTOMY TUBE EXCHANGE 1/8/2013 [facility]    IR NEPHROSTOMY TUBE EXCHANGE   3/13/2013     IR NEPHROSTOMY TUBE EXCHANGE 3/13/2013 [facility] CLINICAL LEGACY    IR NEPHROSTOMY TUBE EXCHANGE   4/12/2013     IR NEPHROSTOMY TUBE EXCHANGE 4/12/2013 [facility]GENCY [facility]    IR NEPHROSTOMY TUBE EXCHANGE   6/19/2013     IR NEPHROSTOMY TUBE EXCHANGE 6/19/2013 [facility] CLINICAL LEGACY    IR NEPHROSTOMY TUBE EXCHANGE   7/24/2013     IR NEPHROSTOMY TUBE EXCHANGE 7/24/2013 [facility] CLINICAL LEGACY    IR NEPHROSTOMY TUBE EXCHANGE   11/25/2013     IR NEPHROSTOMY TUBE EXCHANGE 11/25/2013 [facility] CLINICAL LEGACY    IR NEPHROSTOMY TUBE EXCHANGE   3/6/2014     IR NEPHROSTOMY TUBE EXCHANGE 3/6/2014 [facility] CLINICAL LEGACY    IR NEPHROSTOMY TUBE EXCHANGE   5/2/2014     IR NEPHROSTOMY TUBE EXCHANGE 5/2/2014 [facility] CLINICAL LEGACY    IR NEPHROSTOMY TUBE EXCHANGE   6/9/2014     IR NEPHROSTOMY TUBE EXCHANGE 6/9/2014 [facility]    IR NEPHROSTOMY TUBE EXCHANGE   7/14/2014     IR NEPHROSTOMY TUBE EXCHANGE 7/14/2014 [facility] CLINICAL LEGACY    IR NEPHROSTOMY TUBE EXCHANGE   8/18/2014     IR NEPHROSTOMY TUBE EXCHANGE 8/18/2014 [facility] CLINICAL LEGACY    IR NEPHROSTOMY TUBE EXCHANGE   10/7/2014     IR NEPHROSTOMY TUBE EXCHANGE 10/7/2014 [facility]    IR NEPHROSTOMY TUBE EXCHANGE   11/30/2014     IR NEPHROSTOMY TUBE EXCHANGE 11/30/2014 [facility] CLINICAL LEGACY    IR NEPHROSTOMY TUBE EXCHANGE   12/30/2014     IR  NEPHROSTOMY TUBE EXCHANGE 12/30/2014 [facility]    IR NEPHROSTOMY TUBE EXCHANGE   4/8/2015     IR NEPHROSTOMY TUBE EXCHANGE 4/8/2015 [facility]    IR NEPHROSTOMY TUBE EXCHANGE   6/5/2015     IR NEPHROSTOMY TUBE EXCHANGE 6/5/2015 [facility]    IR NEPHROSTOMY TUBE EXCHANGE   8/3/2015     IR NEPHROSTOMY TUBE EXCHANGE 8/3/2015 [facility] CLINICAL LEGACY    IR NEPHROSTOMY TUBE EXCHANGE   10/1/2015     IR NEPHROSTOMY TUBE EXCHANGE 10/1/2015 [facility]    IR NEPHROSTOMY TUBE EXCHANGE   11/25/2015     IR NEPHROSTOMY TUBE EXCHANGE 11/25/2015 [facility]    IR NEPHROSTOMY TUBE EXCHANGE   12/28/2015     IR NEPHROSTOMY TUBE EXCHANGE 12/28/2015 [facility] CLINICAL LEGACY    IR NEPHROSTOMY TUBE EXCHANGE   1/28/2016     IR NEPHROSTOMY TUBE EXCHANGE 1/28/2016 [facility]    IR NEPHROSTOMY TUBE EXCHANGE   1/10/2018     IR NEPHROSTOMY TUBE EXCHANGE 1/10/2018 [facility]    IR NEPHROSTOMY TUBE EXCHANGE   4/30/2018     IR NEPHROSTOMY TUBE EXCHANGE 4/30/2018 [facility]    IR NEPHROSTOMY TUBE EXCHANGE   5/22/2018     IR NEPHROSTOMY TUBE EXCHANGE 5/22/2018 [facility]GENCY [facility]GAC[facility]    IR NEPHROSTOMY TUBE EXCHANGE   1/31/2017     IR NEPHROSTOMY TUBE EXCHANGE 1/31/2017 [facility]    IR NEPHROSTOMY TUBE EXCHANGE   3/15/2017     IR NEPHROSTOMY TUBE EXCHANGE 3/15/2017 [facility]    IR NEPHROSTOMY TUBE EXCHANGE   4/19/2017     IR NEPHROSTOMY TUBE EXCHANGE 4/19/2017 [facility]    IR NEPHROSTOMY TUBE EXCHANGE   5/18/2017     IR NEPHROSTOMY TUBE EXCHANGE 5/18/2017 [facility]    IR NEPHROSTOMY TUBE EXCHANGE   6/29/2017     IR NEPHROSTOMY TUBE EXCHANGE 6/29/2017 [facility]    IR NEPHROSTOMY TUBE EXCHANGE   8/17/2017     IR NEPHROSTOMY TUBE EXCHANGE 8/17/2017 [facility]    IR NEPHROSTOMY TUBE EXCHANGE   10/13/2017     IR NEPHROSTOMY TUBE EXCHANGE 10/13/2017 [facility]    IR NEPHROSTOMY TUBE EXCHANGE   3/21/2012     IR NEPHROSTOMY TUBE EXCHANGE 3/21/2012 [facility]    IR NEPHROSTOMY TUBE EXCHANGE   5/24/2012      IR NEPHROSTOMY TUBE EXCHANGE 5/24/2012 [facility] INPATIENT LEGACY    IR NEPHROSTOMY TUBE EXCHANGE   8/7/2012     IR NEPHROSTOMY TUBE EXCHANGE 8/7/2012 [facility] AIB LEGACY    IR NEPHROSTOMY TUBE EXCHANGE   9/5/2012     IR NEPHROSTOMY TUBE EXCHANGE 9/5/2012 [facility] AIB LEGACY    IR NEPHROSTOMY TUBE EXCHANGE   11/5/2012     IR NEPHROSTOMY TUBE EXCHANGE 11/5/2012 [facility] CLINICAL LEGACY    OTHER SURGICAL HISTORY   10/14/2013     Laparos Total Colectomy W/ Proctectomy, Ileoanal Anastomosis    OTHER SURGICAL HISTORY   10/14/2013     Kock Pouch Endoscopy    OTHER SURGICAL HISTORY   10/14/2013     Ileostomy Non-Tube    OTHER SURGICAL HISTORY   10/14/2013     Ileostomy Revision Complex    US GUIDED PERCUTANEOUS PERITONEAL OR RETROPERITONEAL FLUID COLLECTION DRAINAGE   1/2/2015     US GUIDED PERCUTANEOUS PERITONEAL OR RETROPERITONEAL FLUID COLLECTION DRAINAGE 1/2/2015 [facility] EMERGENCY LEGACY            Social History  He reports that he has never smoked. He has never used smokeless tobacco. No history on file for alcohol use and drug use.     Family History  Family History   No family history on file.         Allergies  Coconut and Hydrocodone-acetaminophen     Review of Systems   Constitutional:  Positive for chills and fever.   HENT:  Negative for congestion, rhinorrhea and sore throat.    Respiratory:  Positive for wheezing. Negative for cough and shortness of breath.    Cardiovascular:  Negative for chest pain, palpitations and leg swelling.   Gastrointestinal:  Positive for abdominal pain and nausea. Negative for abdominal distention, blood in stool, constipation, diarrhea and vomiting.   Genitourinary:  Negative for difficulty urinating, dysuria, frequency, hematuria, penile discharge, penile pain, penile swelling, testicular pain and urgency.   Neurological:  Negative for dizziness, weakness and light-headedness.         Physical Exam 5/27/2024  Constitutional:       Appearance:He is not toxic-appearing.      Comments: Patient says he  "feels improved since admission but not back to baseline.    HENT:      Head: Normocephalic and atraumatic.      Nose: Nose normal.      Mouth/Throat:      Mouth: Mucous membranes are moist.      Pharynx: Oropharynx is clear.   Eyes:      Extraocular Movements: Extraocular movements intact.      Conjunctiva/sclera: Conjunctivae normal.      Pupils: Pupils are equal, round, and reactive to light.   Cardiovascular:      Rate and Rhythm: Normal rate and regular rhythm.      Pulses: Normal pulses.   Pulmonary:      Effort: Pulmonary effort is normal. No respiratory distress.      Breath sounds: Clear to auscultation bilaterally   abdominal:      General: Abdomen is flat. Bowel sounds are normal.      Palpations: Abdomen is soft.      Tenderness: There is no abdominal tenderness.  No guarding or rebound  Musculoskeletal:         General: Normal range of motion.      Cervical back: Normal range of motion.      Right lower leg: No edema.      Left lower leg: No edema.   Skin:     General: Skin is warm and dry.   Neurological:      General: No focal deficit present.      Mental Status: He is oriented to person, place, and time.   Psychiatric:         Mood and Affect: Mood normal.         Behavior: Behavior normal.         Thought Content: Thought content normal.            Last Recorded Vitals  Blood pressure 119/73, pulse 89, temperature 37.8 °C (100 °F), temperature source Temporal, resp. rate 16, height 1.702 m (5' 7\"), weight 66.6 kg (146 lb 13.2 oz), SpO2 96%.     Relevant Results on admission               Results for orders placed or performed during the hospital encounter of 05/26/24 (from the past 24 hour(s))   CBC and Auto Differential   Result Value Ref Range     WBC 15.4 (H) 4.4 - 11.3 x10*3/uL     nRBC 0.0 0.0 - 0.0 /100 WBCs     RBC 4.54 4.50 - 5.90 x10*6/uL     Hemoglobin 13.5 13.5 - 17.5 g/dL     Hematocrit 36.2 (L) 41.0 - 52.0 %     MCV 80 80 - 100 fL     MCH 29.7 26.0 - 34.0 pg     MCHC 37.3 (H) 32.0 - 36.0 " g/dL     RDW 13.2 11.5 - 14.5 %     Platelets 212 150 - 450 x10*3/uL     Neutrophils % 88.2 40.0 - 80.0 %     Immature Granulocytes %, Automated 0.4 0.0 - 0.9 %     Lymphocytes % 5.3 13.0 - 44.0 %     Monocytes % 5.6 2.0 - 10.0 %     Eosinophils % 0.2 0.0 - 6.0 %     Basophils % 0.3 0.0 - 2.0 %     Neutrophils Absolute 13.56 (H) 1.20 - 7.70 x10*3/uL     Immature Granulocytes Absolute, Automated 0.06 0.00 - 0.70 x10*3/uL     Lymphocytes Absolute 0.81 (L) 1.20 - 4.80 x10*3/uL     Monocytes Absolute 0.86 0.10 - 1.00 x10*3/uL     Eosinophils Absolute 0.03 0.00 - 0.70 x10*3/uL     Basophils Absolute 0.05 0.00 - 0.10 x10*3/uL   Comprehensive metabolic panel   Result Value Ref Range     Glucose 94 74 - 99 mg/dL     Sodium 136 136 - 145 mmol/L     Potassium 3.4 (L) 3.5 - 5.3 mmol/L     Chloride 105 98 - 107 mmol/L     Bicarbonate 23 21 - 32 mmol/L     Anion Gap 11 10 - 20 mmol/L     Urea Nitrogen 5 (L) 6 - 23 mg/dL     Creatinine 1.23 0.50 - 1.30 mg/dL     eGFR 80 >60 mL/min/1.73m*2     Calcium 9.0 8.6 - 10.3 mg/dL     Albumin 4.0 3.4 - 5.0 g/dL     Alkaline Phosphatase 56 33 - 120 U/L     Total Protein 6.5 6.4 - 8.2 g/dL     AST 21 9 - 39 U/L     Bilirubin, Total 0.6 0.0 - 1.2 mg/dL     ALT 18 10 - 52 U/L   Lipase   Result Value Ref Range     Lipase 30 9 - 82 U/L   Lactate   Result Value Ref Range     Lactate 1.1 0.4 - 2.0 mmol/L   Urinalysis with Reflex Culture and Microscopic   Result Value Ref Range     Color, Urine Yellow Light-Yellow, Yellow, Dark-Yellow     Appearance, Urine Clear Clear     Specific Gravity, Urine 1.018 1.005 - 1.035     pH, Urine 6.0 5.0, 5.5, 6.0, 6.5, 7.0, 7.5, 8.0     Protein, Urine 20 (TRACE) NEGATIVE, 10 (TRACE), 20 (TRACE) mg/dL     Glucose, Urine Normal Normal mg/dL     Blood, Urine 0.03 (TRACE) (A) NEGATIVE     Ketones, Urine NEGATIVE NEGATIVE mg/dL     Bilirubin, Urine NEGATIVE NEGATIVE     Urobilinogen, Urine Normal Normal mg/dL     Nitrite, Urine NEGATIVE NEGATIVE     Leukocyte Esterase,  Urine 250 Alice/µL (A) NEGATIVE   Microscopic Only, Urine   Result Value Ref Range     WBC, Urine 21-50 (A) 1-5, NONE /HPF     WBC Clumps, Urine RARE Reference range not established. /HPF     RBC, Urine 3-5 NONE, 1-2, 3-5 /HPF     Mucus, Urine FEW Reference range not established. /LPF      CT abdomen pelvis w IV contrast     Result Date: 5/26/2024  Interpreted By:  Aleks Avila, STUDY: CT ABDOMEN PELVIS W IV CONTRAST;  5/26/2024 7:02 am   INDICATION: Signs/Symptoms:abdominal pain.   COMPARISON: None.   ACCESSION NUMBER(S): [id number]   ORDERING CLINICIAN: MARCO DOE   TECHNIQUE: CT of the abdomen and pelvis was performed.  Standard contiguous axial images were obtained at 3 mm slice thickness through the abdomen and pelvis. Coronal and sagittal reconstructions at 3 mm slice thickness were performed.   75 cc Omnipaque 350 administered intravenously without immediate complication.   FINDINGS: LOWER CHEST: The lung bases are hyperinflated.   ABDOMEN:   LIVER: The liver is grossly unremarkable. No definite focal liver lesions.   BILE DUCTS: No bile duct dilatation.   GALLBLADDER: Suboptimally distended gallbladder.   PANCREAS: Unremarkable pancreas.   SPLEEN: Unremarkable spleen.   ADRENAL GLANDS: No adrenal masses.   KIDNEYS AND URETERS: Right nephrectomy. Unremarkable left kidney. No hydronephrosis or hydroureter.   PELVIS:   BLADDER: Grossly unremarkable.   REPRODUCTIVE ORGANS: No definite masses.   BOWEL: Total colectomy with ileal anal pouch and right lower quadrant ostomy, similar to the prior exam. No evidence of bowel obstruction.   VESSELS: Grossly unremarkable.   PERITONEUM/RETROPERITONEUM/LYMPH NODES: No retroperitoneal adenopathy. No ascites.   BONES AND ABDOMINAL WALL: Right anterior abdominal ostomy. No acute osseous abnormalities.        Total colectomy with ileal anal pouch and right lower quadrant ostomy, similar to the prior exam.No bowel obstruction.   Right nephrectomy.   No acute abnormalities.    MACRO: None   Signed by: Aleks Avila 5/26/2024 8:04 AM Dictation workstation:   [id number]       5/27/2024 Patient currently feels better, his abdominal pain was acute on chronic chronic issue [place] stopped [place] for a while and says he is dealing with it is acute presentation is greatly improved he received antibiotics blood cultures pending he also received morphine IV x 2 so far ;  patient unfortunately he developed worsening renal function with his presentation he has a solitary kidney status post nephrectomy on the right at this point we are unable to discharge him he is complex patient will continue with fluid challenge continue antibiotic pending cultures '  His potassium is also low again even after appropriate supplementation yesterday patient partial responder and not able to be discharged within 24 hours for this condition will admit.     Assessment/Plan   Principal Problem:  Abdominal pain      Suspected sepsis hemodynamically stable currently  With worsening renal function and single kidney/FABRICIO  Urinary tract infection  Leukocytosis  -Urology consult and recommendations appreciated  -UA shows evidence of infection, urine culture pending  -IV Rocephin initiated in the ED,   Changed to Zosyn as history of ESBL in the past infectious disease consulted as well repeat cultures  -IV fluids continued  -Following blood cultures  -Acetaminophen as needed for pain and fever  -Morphine as needed for pain control  -Pt can have Zofran once EKG comes back  -Postvoid residual ordered     Hypokalemia  -Potassium 3.3 today  -40 mg potassium replacement given  Give another dose     FABRICIO with single kidney worsening renal function unfortunately cannot discharge will continue with IV fluids fluid challenge to repeat labs in the morning of worsening nephrology consultation will be given    -Monitor with morning BMP, Mg     DVT prophylaxis  -Lovenox  -SCDs     Chronic  conditions: 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 Patient improved, urine culture negative. Medically cleared for discharge today on antibiotics. Follow up with urology. Medications and labs reviewed, continue plan as above.

## 2024-05-28 NOTE — CONSULTS
Reason For Consult  Acute kidney injury  History Of Present Illness  Ingrid Maurice is a 33 y.o. male presenting with abdominal pain with fevers.  Was found to have urinary tract infection with pyuria sediment showed WBC of more than 24-50 WBCs per high-power field with positive white cell cast mild hematuria with RBCs of 3-5 per high-power field.  Patient history of Faust syndrome, and also history of nephrolithiasis.  He also had right nephrectomy send can [place] to dermoid tumor.  He denies hematuria  Denies nocturia    Procedure       Past Medical History  He has a past medical history of Hemorrhage of anus and rectum.    Surgical History  He has a past surgical history that includes Other surgical history (10/14/2013); Abdominal adhesion surgery (10/14/2013); Other surgical history (10/14/2013); Other surgical history (10/14/2013); Exploratory laparotomy (10/14/2013); Other surgical history (10/14/2013); IR nephrostomy tube exchange (1/8/2013); IR GI tube exchange (1/8/2013); IR nephrostomy tube exchange (3/13/2013); IR GI tube exchange (3/13/2013); IR nephrostomy tube exchange (4/12/2013); IR GI tube exchange (4/12/2013); IR nephrostomy tube exchange (6/19/2013); IR GI tube exchange (6/19/2013); IR nephrostomy tube exchange (7/24/2013); IR GI tube exchange (7/24/2013); IR nephrostomy tube exchange (11/25/2013); IR GI tube exchange (11/25/2013); IR nephrostomy tube exchange (3/6/2014); IR GI tube exchange (3/6/2014); IR nephrostomy tube exchange (5/2/2014); IR GI tube exchange (5/2/2014); IR nephrostomy tube exchange (6/9/2014); IR GI tube exchange (6/9/2014); IR nephrostomy tube exchange (7/14/2014); IR GI tube exchange (7/14/2014); IR nephrostomy tube exchange (8/18/2014); IR GI tube exchange (8/18/2014); IR nephrostomy tube exchange (10/7/2014); IR GI tube exchange (10/7/2014); IR nephrostomy tube exchange (11/30/2014); IR GI tube exchange (11/30/2014); IR nephrostomy tube exchange (12/30/2014); IR GI  tube exchange (12/30/2014); US guided percutaneous peritoneal or retroperitoneal fluid collection drainage (1/2/2015); IR nephrostomy tube exchange (4/8/2015); IR GI tube exchange (4/8/2015); IR nephrostomy tube exchange (6/5/2015); IR GI tube exchange (6/5/2015); IR nephrostomy tube exchange (8/3/2015); IR GI tube exchange (8/3/2015); IR nephrostomy tube exchange (10/1/2015); IR nephrostomy tube exchange (11/25/2015); IR GI tube exchange (11/25/2015); IR nephrostomy tube exchange (12/28/2015); IR GI tube exchange (12/28/2015); IR nephrostomy tube exchange (1/28/2016); IR nephrostomy tube exchange (1/10/2018); IR nephrostomy tube exchange (4/30/2018); IR nephrostomy tube exchange (5/22/2018); IR nephrostomy tube exchange (1/31/2017); IR nephrostomy tube exchange (3/15/2017); IR nephrostomy tube exchange (4/19/2017); IR nephrostomy tube exchange (5/18/2017); IR nephrostomy tube exchange (6/29/2017); IR nephrostomy tube exchange (8/17/2017); IR nephrostomy tube exchange (10/13/2017); CT guided percutaneous peritoneal or retroperitoneal fluid collection drainage (7/3/2018); IR nephrostomy tube exchange (3/21/2012); IR GI tube exchange (3/21/2012); IR nephrostomy tube exchange (5/24/2012); IR GI tube exchange (5/24/2012); IR nephrostomy tube exchange (8/7/2012); IR GI tube exchange (8/7/2012); IR nephrostomy tube exchange (9/5/2012); IR GI tube exchange (9/5/2012); IR nephrostomy tube exchange (11/5/2012); and IR GI tube exchange (11/5/2012).     Social History  He reports that he has never smoked. He has never used smokeless tobacco. No history on file for alcohol use and drug use.    Family History  No family history on file.     Allergies  Coconut and Hydrocodone-acetaminophen    Review of System  HEENT; complaints  Cardiac; denies complaint  Pulmonary; denies complaint  GI denies hematochezia or melena  ; refer to present illness  Neurological; tremors or asterixis  Physical Exam  General appearance; pleasant  74-year-old male in no acute distress  HEENT; pupils react to light and accommodation  Neck; no JVD or bruit  Lungs; clear to auscultation and percussion   Heart regular rate no gallop or rubs;  Abdomen;; active peristalsis no rebound or guarding  Extremities; no edema  Results for orders placed or performed during the hospital encounter of 05/26/24 (from the past 24 hour(s))   CBC and Auto Differential   Result Value Ref Range    WBC 9.8 4.4 - 11.3 x10*3/uL    nRBC 0.0 0.0 - 0.0 /100 WBCs    RBC 4.91 4.50 - 5.90 x10*6/uL    Hemoglobin 14.1 13.5 - 17.5 g/dL    Hematocrit 40.1 (L) 41.0 - 52.0 %    MCV 82 80 - 100 fL    MCH 28.7 26.0 - 34.0 pg    MCHC 35.2 32.0 - 36.0 g/dL    RDW 13.7 11.5 - 14.5 %    Platelets 234 150 - 450 x10*3/uL    Neutrophils % 62.1 40.0 - 80.0 %    Immature Granulocytes %, Automated 0.3 0.0 - 0.9 %    Lymphocytes % 21.4 13.0 - 44.0 %    Monocytes % 14.2 2.0 - 10.0 %    Eosinophils % 1.5 0.0 - 6.0 %    Basophils % 0.5 0.0 - 2.0 %    Neutrophils Absolute 6.07 1.20 - 7.70 x10*3/uL    Immature Granulocytes Absolute, Automated 0.03 0.00 - 0.70 x10*3/uL    Lymphocytes Absolute 2.09 1.20 - 4.80 x10*3/uL    Monocytes Absolute 1.39 (H) 0.10 - 1.00 x10*3/uL    Eosinophils Absolute 0.15 0.00 - 0.70 x10*3/uL    Basophils Absolute 0.05 0.00 - 0.10 x10*3/uL   Basic Metabolic Panel   Result Value Ref Range    Glucose 74 74 - 99 mg/dL    Sodium 138 136 - 145 mmol/L    Potassium 3.3 (L) 3.5 - 5.3 mmol/L    Chloride 103 98 - 107 mmol/L    Bicarbonate 25 21 - 32 mmol/L    Anion Gap 13 10 - 20 mmol/L    Urea Nitrogen 7 6 - 23 mg/dL    Creatinine 1.39 (H) 0.50 - 1.30 mg/dL    eGFR 69 >60 mL/min/1.73m*2    Calcium 9.2 8.6 - 10.3 mg/dL         No results found.        I&O 24HR    Intake/Output Summary (Last 24 hours) at 5/28/2024 1345  Last data filed at 5/28/2024 1200  Gross per 24 hour   Intake 2038.34 ml   Output --   Net 2038.34 ml       Vitals 24HR  Heart Rate:  [69-84]   Temp:  [36.2 °C (97.2 °F)-37.2 °C (99  °F)]   Resp:  [15-18]   BP: (116-122)/(73-82)   SpO2:  [98 %-99 %]              Assessment/Plan   FABRICIO  Status post nephrectomy  UTI      Antibiotic as per primary  Will check urinary citrate  Will check urine for microalbumin  Okay for discharge from nephrology point of view  Zachary Young MD

## 2024-05-28 NOTE — PROGRESS NOTES
"Ingrid Maurice is a 33 y.o. male on day 1 of admission presenting with Chest pain.    Subjective   No acute events overnight. No new complaints. He is having no difficulty with urination. Denies pain. He would like to go home because he has to work tomorrow.        Objective     Physical Exam  Constitutional:       Appearance: Normal appearance.   HENT:      Mouth/Throat:      Mouth: Mucous membranes are moist.   Pulmonary:      Effort: Pulmonary effort is normal.   Skin:     General: Skin is warm and dry.   Neurological:      Mental Status: He is alert and oriented to person, place, and time. Mental status is at baseline.   Psychiatric:         Mood and Affect: Mood normal.         Behavior: Behavior normal.         Last Recorded Vitals  Blood pressure 117/76, pulse 84, temperature 36.7 °C (98.1 °F), temperature source Temporal, resp. rate 18, height 1.702 m (5' 7\"), weight 66.6 kg (146 lb 13.2 oz), SpO2 98%.  Intake/Output last 3 Shifts:  I/O last 3 completed shifts:  In: 1988.3 (29.9 mL/kg) [I.V.:1688.3 (25.4 mL/kg); IV Piggyback:300]  Out: - (0 mL/kg)   Weight: 66.6 kg     Relevant Results  Scheduled medications  lactobacillus acidophilus, 1 tablet, oral, BID  piperacillin-tazobactam, 3.375 g, intravenous, q6h  potassium chloride, 20 mEq, oral, BID  potassium chloride CR, 40 mEq, oral, Daily      Continuous medications  lactated Ringer's, 100 mL/hr, Last Rate: 100 mL/hr (05/28/24 1200)      PRN medications  PRN medications: acetaminophen, morphine, ondansetron **OR** ondansetron    Results for orders placed or performed during the hospital encounter of 05/26/24 (from the past 24 hour(s))   CBC and Auto Differential   Result Value Ref Range    WBC 9.8 4.4 - 11.3 x10*3/uL    nRBC 0.0 0.0 - 0.0 /100 WBCs    RBC 4.91 4.50 - 5.90 x10*6/uL    Hemoglobin 14.1 13.5 - 17.5 g/dL    Hematocrit 40.1 (L) 41.0 - 52.0 %    MCV 82 80 - 100 fL    MCH 28.7 26.0 - 34.0 pg    MCHC 35.2 32.0 - 36.0 g/dL    RDW 13.7 11.5 - 14.5 % "    Platelets 234 150 - 450 x10*3/uL    Neutrophils % 62.1 40.0 - 80.0 %    Immature Granulocytes %, Automated 0.3 0.0 - 0.9 %    Lymphocytes % 21.4 13.0 - 44.0 %    Monocytes % 14.2 2.0 - 10.0 %    Eosinophils % 1.5 0.0 - 6.0 %    Basophils % 0.5 0.0 - 2.0 %    Neutrophils Absolute 6.07 1.20 - 7.70 x10*3/uL    Immature Granulocytes Absolute, Automated 0.03 0.00 - 0.70 x10*3/uL    Lymphocytes Absolute 2.09 1.20 - 4.80 x10*3/uL    Monocytes Absolute 1.39 (H) 0.10 - 1.00 x10*3/uL    Eosinophils Absolute 0.15 0.00 - 0.70 x10*3/uL    Basophils Absolute 0.05 0.00 - 0.10 x10*3/uL   Basic Metabolic Panel   Result Value Ref Range    Glucose 74 74 - 99 mg/dL    Sodium 138 136 - 145 mmol/L    Potassium 3.3 (L) 3.5 - 5.3 mmol/L    Chloride 103 98 - 107 mmol/L    Bicarbonate 25 21 - 32 mmol/L    Anion Gap 13 10 - 20 mmol/L    Urea Nitrogen 7 6 - 23 mg/dL    Creatinine 1.39 (H) 0.50 - 1.30 mg/dL    eGFR 69 >60 mL/min/1.73m*2    Calcium 9.2 8.6 - 10.3 mg/dL       CT abdomen pelvis w IV contrast    Result Date: 5/26/2024  Interpreted By:  Aleks Avila, STUDY: CT ABDOMEN PELVIS W IV CONTRAST;  5/26/2024 7:02 am   INDICATION: Signs/Symptoms:abdominal pain.   COMPARISON: None.   ACCESSION NUMBER(S): [id number]   ORDERING CLINICIAN: MARCO DOE   TECHNIQUE: CT of the abdomen and pelvis was performed.  Standard contiguous axial images were obtained at 3 mm slice thickness through the abdomen and pelvis. Coronal and sagittal reconstructions at 3 mm slice thickness were performed.   75 cc Omnipaque 350 administered intravenously without immediate complication.   FINDINGS: LOWER CHEST: The lung bases are hyperinflated.   ABDOMEN:   LIVER: The liver is grossly unremarkable. No definite focal liver lesions.   BILE DUCTS: No bile duct dilatation.   GALLBLADDER: Suboptimally distended gallbladder.   PANCREAS: Unremarkable pancreas.   SPLEEN: Unremarkable spleen.   ADRENAL GLANDS: No adrenal masses.   KIDNEYS AND URETERS: Right  nephrectomy. Unremarkable left kidney. No hydronephrosis or hydroureter.   PELVIS:   BLADDER: Grossly unremarkable.   REPRODUCTIVE ORGANS: No definite masses.   BOWEL: Total colectomy with ileal anal pouch and right lower quadrant ostomy, similar to the prior exam. No evidence of bowel obstruction.   VESSELS: Grossly unremarkable.   PERITONEUM/RETROPERITONEUM/LYMPH NODES: No retroperitoneal adenopathy. No ascites.   BONES AND ABDOMINAL WALL: Right anterior abdominal ostomy. No acute osseous abnormalities.       Total colectomy with ileal anal pouch and right lower quadrant ostomy, similar to the prior exam.No bowel obstruction.   Right nephrectomy.   No acute abnormalities.   MACRO: None   Signed by: Aleks Avila 5/26/2024 8:04 AM Dictation workstation:   [id number]                  Assessment/Plan   Principal Problem:    Chest pain  Active Problems:    Pyelonephritis    Ingrid Maurice is a 33 y.o. 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 He had neph tubes for some time but ultimately ended up having a right nephrectomy about 4-5 yrs ago. Was seen by urology at [facility] [place] in 2022 for recurrent UTIs, then lost to follow-up. He said this is the first UTI he has had in over a year.  History of ESBL E. coli resistant to E. coli in 2021 and 2022. Pt presented to  [facility] on 5/26 for fever and chills. Febrile in ED at 38.2, no further fevers since. Received rocephin x1 in ED.     Impression:  UTI  H/o ESBL E. coli UTI  H/o Rt nephrectomy   H/o recurrent UTIs     Recommendations:  - CT images reviewed with no sign of pyelonephritis  - 5/26 Urine cx shows no growth, 5/27 Urine cx pending, blood cx negative to date  - If urine cx is negative will defer further care to primary team and ID as there is no indication of urologic source for infection  - Pt should follow-up with Dr. Delgado after  discharge     Urology will sign-off but is available if needed     Patient discussed with attending surgeon, Dr. Agee       I spent 20 minutes in the professional and overall care of this patient.      Marian Reid, APRN-CNP

## 2024-05-29 ENCOUNTER — APPOINTMENT (OUTPATIENT)
Dept: AUDIOLOGY | Facility: HOSPITAL | Age: 34
End: 2024-05-29

## 2024-05-29 LAB
ATRIAL RATE: 81 BPM
C TRACH RRNA SPEC QL NAA+PROBE: NEGATIVE
N GONORRHOEA DNA SPEC QL PROBE+SIG AMP: NEGATIVE
P AXIS: 38 DEGREES
PR INTERVAL: 168 MS
Q ONSET: 253 MS
QRS COUNT: 14 BEATS
QRS DURATION: 78 MS
QT INTERVAL: 362 MS
QTC CALCULATION(BAZETT): 423 MS
QTC FREDERICIA: 401 MS
R AXIS: 61 DEGREES
T AXIS: 43 DEGREES
T OFFSET: 434 MS
VENTRICULAR RATE: 82 BPM

## 2024-05-29 NOTE — CONSULTS
Wound Care Consult     Per nurse patient is independent with his ostomy care. There are no issues with pouch or skin.    Karina Waterman RN WOCN

## 2024-05-30 LAB
BACTERIA BLD CULT: NORMAL
BACTERIA BLD CULT: NORMAL
BACTERIA UR CULT: NO GROWTH
CITRATE 24H UR-MRATE: ABNORMAL MG/D (ref 320–1240)
CITRATE UR-MCNC: 74 MG/L
CITRATE/CREAT UR: 26 MG/G
COLLECT DURATION TIME SPEC: ABNORMAL HR
CREAT 24H UR-MRATE: ABNORMAL MG/D (ref 1000–2500)
CREAT UR-MCNC: 280 MG/DL
SPECIMEN VOL ?TM UR: ABNORMAL ML

## 2024-06-01 NOTE — DOCUMENTATION CLARIFICATION NOTE
"    PATIENT:               COOPER RUIZ  ACCT #:                  [id number]  MRN:                       [id number]  :                       1990  ADMIT DATE:       2024 3:15 AM  DISCH DATE:        2024 3:45 PM  RESPONDING PROVIDER #:        [id number]          PROVIDER RESPONSE TEXT:    Metabolic encephalopathy 2/2 UTI    CDI QUERY TEXT:    Clarification    Instruction:    Based on your assessment of the patient and the clinical information, please provide the requested documentation by clicking on the appropriate radio button and enter any additional information if prompted.    Question: Please further clarify the most likely etiology of the altered mental status as    When answering this query, please exercise your independent professional judgment. The fact that a question is being asked, does not imply that any particular answer is desired or expected.    The patient's clinical indicators include:  Clinical Information  32 yo presenting with UTI/suspected sepsis    Clinical Indicators  H and P  \"On arrival, patient has temperature of 100.8 degrees\", \"WBC 15.4\", 'Urinalysis shows evidence of infection\", \"ill-appearing\", \"Patient is lethargic. He did fall asleep once during my interview\", \"abdominal tenderness\",  Alice/ul Leukocyte Esterase    24  WBCs 15.4  Temp 100.8  HR 93-98    27 PN Dr. Ngo  \"chills and fever\"  \"Patient says he feels improved since admission but not back to baseline\"    24 Urology  \"He is alert and oriented to person, place and time.  Mental status is at baseline\"    Treatment  IV NS bolus 1000 mls/2, IV Rocephin/1 day, IV Zosyn/3 days, Urology consult, Nephrology consult, lab monitoring    Risk Factors  32 yo with UTI, colectomy, familial adenomatous polyposis  Options provided:  -- Metabolic encephalopathy 2/2 UTI  -- Septic encephalopathy 2/2 UTI  -- Other - I will add my own diagnosis  -- Refer to Clinical Documentation Reviewer    Query created by: " Yahaira Hancock on 5/30/2024 5:17 PM      Electronically signed by:  MARCO DOUGHERTY MD 6/1/2024 3:16 PM

## 2024-06-06 ENCOUNTER — HOSPITAL ENCOUNTER (INPATIENT)
Facility: HOSPITAL | Age: 34
LOS: 3 days | Discharge: HOME | End: 2024-06-09
Attending: EMERGENCY MEDICINE | Admitting: STUDENT IN AN ORGANIZED HEALTH CARE EDUCATION/TRAINING PROGRAM
Payer: COMMERCIAL

## 2024-06-06 ENCOUNTER — CLINICAL SUPPORT (OUTPATIENT)
Dept: EMERGENCY MEDICINE | Facility: HOSPITAL | Age: 34
End: 2024-06-06
Payer: COMMERCIAL

## 2024-06-06 ENCOUNTER — APPOINTMENT (OUTPATIENT)
Dept: RADIOLOGY | Facility: HOSPITAL | Age: 34
End: 2024-06-06
Payer: COMMERCIAL

## 2024-06-06 DIAGNOSIS — N12 PYELONEPHRITIS: ICD-10-CM

## 2024-06-06 DIAGNOSIS — N17.9 ACUTE RENAL FAILURE, UNSPECIFIED ACUTE RENAL FAILURE TYPE (CMS-HCC): Primary | ICD-10-CM

## 2024-06-06 DIAGNOSIS — R52 PAIN: ICD-10-CM

## 2024-06-06 DIAGNOSIS — E87.5 HYPERKALEMIA: ICD-10-CM

## 2024-06-06 LAB
ALBUMIN SERPL BCP-MCNC: 6 G/DL (ref 3.4–5)
ALP SERPL-CCNC: 116 U/L (ref 33–120)
ALT SERPL W P-5'-P-CCNC: 33 U/L (ref 10–52)
ANION GAP BLDV CALCULATED.4IONS-SCNC: 14 MMOL/L (ref 10–25)
ANION GAP BLDV CALCULATED.4IONS-SCNC: 18 MMOL/L (ref 10–25)
ANION GAP SERPL CALC-SCNC: 28 MMOL/L (ref 10–20)
APPEARANCE UR: ABNORMAL
AST SERPL W P-5'-P-CCNC: 41 U/L (ref 9–39)
ATRIAL RATE: 131 BPM
ATRIAL RATE: 93 BPM
BASE EXCESS BLDV CALC-SCNC: -4.8 MMOL/L (ref -2–3)
BASE EXCESS BLDV CALC-SCNC: -9.1 MMOL/L (ref -2–3)
BASOPHILS # BLD AUTO: 0.12 X10*3/UL (ref 0–0.1)
BASOPHILS NFR BLD AUTO: 0.5 %
BILIRUB SERPL-MCNC: 0.5 MG/DL (ref 0–1.2)
BILIRUB UR STRIP.AUTO-MCNC: NEGATIVE MG/DL
BODY TEMPERATURE: 37 DEGREES CELSIUS
BODY TEMPERATURE: 37 DEGREES CELSIUS
BUN SERPL-MCNC: 30 MG/DL (ref 6–23)
CA-I BLDV-SCNC: 1.23 MMOL/L (ref 1.1–1.33)
CA-I BLDV-SCNC: 1.34 MMOL/L (ref 1.1–1.33)
CALCIUM SERPL-MCNC: 11.5 MG/DL (ref 8.6–10.6)
CARDIAC TROPONIN I PNL SERPL HS: <3 NG/L (ref 0–53)
CARDIAC TROPONIN I PNL SERPL HS: <3 NG/L (ref 0–53)
CHLORIDE BLDV-SCNC: 100 MMOL/L (ref 98–107)
CHLORIDE BLDV-SCNC: 99 MMOL/L (ref 98–107)
CHLORIDE SERPL-SCNC: 98 MMOL/L (ref 98–107)
CO2 SERPL-SCNC: 10 MMOL/L (ref 21–32)
COLOR UR: ABNORMAL
CREAT SERPL-MCNC: 4.19 MG/DL (ref 0.5–1.3)
EGFRCR SERPLBLD CKD-EPI 2021: 18 ML/MIN/1.73M*2
EOSINOPHIL # BLD AUTO: 0.01 X10*3/UL (ref 0–0.7)
EOSINOPHIL NFR BLD AUTO: 0 %
ERYTHROCYTE [DISTWIDTH] IN BLOOD BY AUTOMATED COUNT: 14.6 % (ref 11.5–14.5)
GLUCOSE BLD MANUAL STRIP-MCNC: 246 MG/DL (ref 74–99)
GLUCOSE BLDV-MCNC: 139 MG/DL (ref 74–99)
GLUCOSE BLDV-MCNC: 169 MG/DL (ref 74–99)
GLUCOSE SERPL-MCNC: 120 MG/DL (ref 74–99)
GLUCOSE UR STRIP.AUTO-MCNC: NORMAL MG/DL
HCO3 BLDV-SCNC: 18.4 MMOL/L (ref 22–26)
HCO3 BLDV-SCNC: 22.9 MMOL/L (ref 22–26)
HCT VFR BLD AUTO: 53.7 % (ref 41–52)
HCT VFR BLD EST: 50 % (ref 41–52)
HCT VFR BLD EST: 59 % (ref 41–52)
HGB BLD-MCNC: 19.6 G/DL (ref 13.5–17.5)
HGB BLDV-MCNC: 16.5 G/DL (ref 13.5–17.5)
HGB BLDV-MCNC: 19.6 G/DL (ref 13.5–17.5)
HYALINE CASTS #/AREA URNS AUTO: ABNORMAL /LPF
IMM GRANULOCYTES # BLD AUTO: 0.17 X10*3/UL (ref 0–0.7)
IMM GRANULOCYTES NFR BLD AUTO: 0.7 % (ref 0–0.9)
INHALED O2 CONCENTRATION: 21 %
INHALED O2 CONCENTRATION: 21 %
KETONES UR STRIP.AUTO-MCNC: NEGATIVE MG/DL
LACTATE BLDV-SCNC: 2.7 MMOL/L (ref 0.4–2)
LACTATE BLDV-SCNC: 3.6 MMOL/L (ref 0.4–2)
LACTATE BLDV-SCNC: 4 MMOL/L (ref 0.4–2)
LEUKOCYTE ESTERASE UR QL STRIP.AUTO: ABNORMAL
LIPASE SERPL-CCNC: 92 U/L (ref 9–82)
LYMPHOCYTES # BLD AUTO: 2.09 X10*3/UL (ref 1.2–4.8)
LYMPHOCYTES NFR BLD AUTO: 8.2 %
MAGNESIUM SERPL-MCNC: 2.8 MG/DL (ref 1.6–2.4)
MCH RBC QN AUTO: 28.6 PG (ref 26–34)
MCHC RBC AUTO-ENTMCNC: 36.5 G/DL (ref 32–36)
MCV RBC AUTO: 78 FL (ref 80–100)
MONOCYTES # BLD AUTO: 0.75 X10*3/UL (ref 0.1–1)
MONOCYTES NFR BLD AUTO: 3 %
MUCOUS THREADS #/AREA URNS AUTO: ABNORMAL /LPF
NEUTROPHILS # BLD AUTO: 22.28 X10*3/UL (ref 1.2–7.7)
NEUTROPHILS NFR BLD AUTO: 87.6 %
NITRITE UR QL STRIP.AUTO: NEGATIVE
NRBC BLD-RTO: 0 /100 WBCS (ref 0–0)
OXYHGB MFR BLDV: 26.2 % (ref 45–75)
OXYHGB MFR BLDV: 28 % (ref 45–75)
P AXIS: 82 DEGREES
P AXIS: 82 DEGREES
P OFFSET: 202 MS
P OFFSET: 206 MS
P ONSET: 150 MS
P ONSET: 164 MS
PCO2 BLDV: 44 MM HG (ref 41–51)
PCO2 BLDV: 51 MM HG (ref 41–51)
PH BLDV: 7.23 PH (ref 7.33–7.43)
PH BLDV: 7.26 PH (ref 7.33–7.43)
PH UR STRIP.AUTO: 6 [PH]
PLATELET # BLD AUTO: 629 X10*3/UL (ref 150–450)
PO2 BLDV: 20 MM HG (ref 35–45)
PO2 BLDV: 24 MM HG (ref 35–45)
POTASSIUM BLDV-SCNC: 5 MMOL/L (ref 3.5–5.3)
POTASSIUM BLDV-SCNC: 7.1 MMOL/L (ref 3.5–5.3)
POTASSIUM SERPL-SCNC: 6.9 MMOL/L (ref 3.5–5.3)
PR INTERVAL: 118 MS
PR INTERVAL: 148 MS
PROT SERPL-MCNC: >12 G/DL (ref 6.4–8.2)
PROT UR STRIP.AUTO-MCNC: ABNORMAL MG/DL
Q ONSET: 223 MS
Q ONSET: 224 MS
QRS COUNT: 16 BEATS
QRS COUNT: 21 BEATS
QRS DURATION: 70 MS
QRS DURATION: 78 MS
QT INTERVAL: 284 MS
QT INTERVAL: 320 MS
QTC CALCULATION(BAZETT): 397 MS
QTC CALCULATION(BAZETT): 419 MS
QTC FREDERICIA: 368 MS
QTC FREDERICIA: 370 MS
R AXIS: 85 DEGREES
R AXIS: 89 DEGREES
RBC # BLD AUTO: 6.85 X10*6/UL (ref 4.5–5.9)
RBC # UR STRIP.AUTO: ABNORMAL /UL
RBC #/AREA URNS AUTO: >20 /HPF
SAO2 % BLDV: 26 % (ref 45–75)
SAO2 % BLDV: 28 % (ref 45–75)
SODIUM BLDV-SCNC: 129 MMOL/L (ref 136–145)
SODIUM BLDV-SCNC: 131 MMOL/L (ref 136–145)
SODIUM SERPL-SCNC: 129 MMOL/L (ref 136–145)
SP GR UR STRIP.AUTO: 1.02
SQUAMOUS #/AREA URNS AUTO: ABNORMAL /HPF
T AXIS: 60 DEGREES
T AXIS: 72 DEGREES
T OFFSET: 365 MS
T OFFSET: 384 MS
UROBILINOGEN UR STRIP.AUTO-MCNC: NORMAL MG/DL
VENTRICULAR RATE: 131 BPM
VENTRICULAR RATE: 93 BPM
WBC # BLD AUTO: 25.4 X10*3/UL (ref 4.4–11.3)
WBC #/AREA URNS AUTO: >50 /HPF

## 2024-06-06 PROCEDURE — 96375 TX/PRO/DX INJ NEW DRUG ADDON: CPT

## 2024-06-06 PROCEDURE — 93005 ELECTROCARDIOGRAM TRACING: CPT

## 2024-06-06 PROCEDURE — 85025 COMPLETE CBC W/AUTO DIFF WBC: CPT | Performed by: EMERGENCY MEDICINE

## 2024-06-06 PROCEDURE — 96368 THER/DIAG CONCURRENT INF: CPT

## 2024-06-06 PROCEDURE — 2020000001 HC ICU ROOM DAILY

## 2024-06-06 PROCEDURE — 71045 X-RAY EXAM CHEST 1 VIEW: CPT

## 2024-06-06 PROCEDURE — 81001 URINALYSIS AUTO W/SCOPE: CPT | Performed by: EMERGENCY MEDICINE

## 2024-06-06 PROCEDURE — 96366 THER/PROPH/DIAG IV INF ADDON: CPT

## 2024-06-06 PROCEDURE — 82330 ASSAY OF CALCIUM: CPT | Performed by: EMERGENCY MEDICINE

## 2024-06-06 PROCEDURE — 94640 AIRWAY INHALATION TREATMENT: CPT

## 2024-06-06 PROCEDURE — 87075 CULTR BACTERIA EXCEPT BLOOD: CPT | Performed by: STUDENT IN AN ORGANIZED HEALTH CARE EDUCATION/TRAINING PROGRAM

## 2024-06-06 PROCEDURE — 36415 COLL VENOUS BLD VENIPUNCTURE: CPT | Performed by: EMERGENCY MEDICINE

## 2024-06-06 PROCEDURE — 84484 ASSAY OF TROPONIN QUANT: CPT | Performed by: STUDENT IN AN ORGANIZED HEALTH CARE EDUCATION/TRAINING PROGRAM

## 2024-06-06 PROCEDURE — 83605 ASSAY OF LACTIC ACID: CPT | Performed by: EMERGENCY MEDICINE

## 2024-06-06 PROCEDURE — 71045 X-RAY EXAM CHEST 1 VIEW: CPT | Mod: FOREIGN READ | Performed by: RADIOLOGY

## 2024-06-06 PROCEDURE — 82947 ASSAY GLUCOSE BLOOD QUANT: CPT

## 2024-06-06 PROCEDURE — 2500000005 HC RX 250 GENERAL PHARMACY W/O HCPCS: Mod: SE | Performed by: STUDENT IN AN ORGANIZED HEALTH CARE EDUCATION/TRAINING PROGRAM

## 2024-06-06 PROCEDURE — 74176 CT ABD & PELVIS W/O CONTRAST: CPT | Performed by: RADIOLOGY

## 2024-06-06 PROCEDURE — 96361 HYDRATE IV INFUSION ADD-ON: CPT

## 2024-06-06 PROCEDURE — 83690 ASSAY OF LIPASE: CPT | Performed by: EMERGENCY MEDICINE

## 2024-06-06 PROCEDURE — 82435 ASSAY OF BLOOD CHLORIDE: CPT | Performed by: EMERGENCY MEDICINE

## 2024-06-06 PROCEDURE — 2500000004 HC RX 250 GENERAL PHARMACY W/ HCPCS (ALT 636 FOR OP/ED): Mod: SE | Performed by: STUDENT IN AN ORGANIZED HEALTH CARE EDUCATION/TRAINING PROGRAM

## 2024-06-06 PROCEDURE — 99291 CRITICAL CARE FIRST HOUR: CPT | Mod: 25 | Performed by: EMERGENCY MEDICINE

## 2024-06-06 PROCEDURE — 2500000002 HC RX 250 W HCPCS SELF ADMINISTERED DRUGS (ALT 637 FOR MEDICARE OP, ALT 636 FOR OP/ED): Mod: SE | Performed by: STUDENT IN AN ORGANIZED HEALTH CARE EDUCATION/TRAINING PROGRAM

## 2024-06-06 PROCEDURE — 74176 CT ABD & PELVIS W/O CONTRAST: CPT

## 2024-06-06 PROCEDURE — 99291 CRITICAL CARE FIRST HOUR: CPT | Performed by: STUDENT IN AN ORGANIZED HEALTH CARE EDUCATION/TRAINING PROGRAM

## 2024-06-06 PROCEDURE — 87086 URINE CULTURE/COLONY COUNT: CPT | Performed by: EMERGENCY MEDICINE

## 2024-06-06 PROCEDURE — 83735 ASSAY OF MAGNESIUM: CPT | Performed by: STUDENT IN AN ORGANIZED HEALTH CARE EDUCATION/TRAINING PROGRAM

## 2024-06-06 PROCEDURE — 96376 TX/PRO/DX INJ SAME DRUG ADON: CPT

## 2024-06-06 PROCEDURE — 82805 BLOOD GASES W/O2 SATURATION: CPT | Performed by: EMERGENCY MEDICINE

## 2024-06-06 PROCEDURE — 96365 THER/PROPH/DIAG IV INF INIT: CPT

## 2024-06-06 RX ORDER — INDOMETHACIN 25 MG/1
50 CAPSULE ORAL ONCE
Status: COMPLETED | OUTPATIENT
Start: 2024-06-06 | End: 2024-06-06

## 2024-06-06 RX ORDER — ONDANSETRON HYDROCHLORIDE 2 MG/ML
4 INJECTION, SOLUTION INTRAVENOUS ONCE
Status: COMPLETED | OUTPATIENT
Start: 2024-06-06 | End: 2024-06-06

## 2024-06-06 RX ORDER — INDOMETHACIN 25 MG/1
50 CAPSULE ORAL ONCE
Status: DISCONTINUED | OUTPATIENT
Start: 2024-06-06 | End: 2024-06-07

## 2024-06-06 RX ORDER — CALCIUM GLUCONATE 20 MG/ML
2 INJECTION, SOLUTION INTRAVENOUS ONCE
Status: COMPLETED | OUTPATIENT
Start: 2024-06-06 | End: 2024-06-06

## 2024-06-06 RX ORDER — DEXTROSE 50 % IN WATER (D50W) INTRAVENOUS SYRINGE
25 ONCE
Status: COMPLETED | OUTPATIENT
Start: 2024-06-06 | End: 2024-06-06

## 2024-06-06 RX ORDER — MORPHINE SULFATE 4 MG/ML
4 INJECTION INTRAVENOUS ONCE
Status: COMPLETED | OUTPATIENT
Start: 2024-06-06 | End: 2024-06-06

## 2024-06-06 RX ORDER — ALBUTEROL SULFATE 0.83 MG/ML
10 SOLUTION RESPIRATORY (INHALATION) ONCE
Status: COMPLETED | OUTPATIENT
Start: 2024-06-06 | End: 2024-06-06

## 2024-06-06 RX ORDER — VANCOMYCIN HYDROCHLORIDE 750 MG/150ML
1.5 INJECTION, SOLUTION INTRAVENOUS ONCE
Status: COMPLETED | OUTPATIENT
Start: 2024-06-06 | End: 2024-06-06

## 2024-06-06 RX ORDER — CALCIUM GLUCONATE 20 MG/ML
2 INJECTION, SOLUTION INTRAVENOUS ONCE
Status: DISCONTINUED | OUTPATIENT
Start: 2024-06-06 | End: 2024-06-07

## 2024-06-06 RX ADMIN — SODIUM BICARBONATE 50 MEQ: 84 INJECTION, SOLUTION INTRAVENOUS at 18:52

## 2024-06-06 RX ADMIN — DEXTROSE MONOHYDRATE 25 G: 25 INJECTION, SOLUTION INTRAVENOUS at 18:52

## 2024-06-06 RX ADMIN — SODIUM BICARBONATE 50 MEQ: 84 INJECTION, SOLUTION INTRAVENOUS at 22:21

## 2024-06-06 RX ADMIN — CALCIUM GLUCONATE 2 G: 20 INJECTION, SOLUTION INTRAVENOUS at 22:21

## 2024-06-06 RX ADMIN — MORPHINE SULFATE 4 MG: 4 INJECTION, SOLUTION INTRAMUSCULAR; INTRAVENOUS at 19:17

## 2024-06-06 RX ADMIN — ALBUTEROL SULFATE 10 MG: 2.5 SOLUTION RESPIRATORY (INHALATION) at 18:52

## 2024-06-06 RX ADMIN — SODIUM CHLORIDE, POTASSIUM CHLORIDE, SODIUM LACTATE AND CALCIUM CHLORIDE 1000 ML: 600; 310; 30; 20 INJECTION, SOLUTION INTRAVENOUS at 19:17

## 2024-06-06 RX ADMIN — INSULIN HUMAN 5 UNITS: 100 INJECTION, SOLUTION PARENTERAL at 18:52

## 2024-06-06 RX ADMIN — VANCOMYCIN HYDROCHLORIDE 0.75 G: 750 INJECTION, SOLUTION INTRAVENOUS at 20:40

## 2024-06-06 RX ADMIN — SODIUM CHLORIDE, POTASSIUM CHLORIDE, SODIUM LACTATE AND CALCIUM CHLORIDE 1000 ML: 600; 310; 30; 20 INJECTION, SOLUTION INTRAVENOUS at 19:38

## 2024-06-06 RX ADMIN — PIPERACILLIN SODIUM AND TAZOBACTAM SODIUM 4.5 G: 4; .5 INJECTION, SOLUTION INTRAVENOUS at 20:59

## 2024-06-06 RX ADMIN — CALCIUM GLUCONATE 2 G: 20 INJECTION, SOLUTION INTRAVENOUS at 18:52

## 2024-06-06 RX ADMIN — ONDANSETRON 4 MG: 2 INJECTION INTRAMUSCULAR; INTRAVENOUS at 19:17

## 2024-06-06 RX ADMIN — VANCOMYCIN HYDROCHLORIDE 0.75 G: 750 INJECTION, SOLUTION INTRAVENOUS at 21:25

## 2024-06-06 RX ADMIN — SODIUM CHLORIDE, POTASSIUM CHLORIDE, SODIUM LACTATE AND CALCIUM CHLORIDE 1000 ML: 600; 310; 30; 20 INJECTION, SOLUTION INTRAVENOUS at 20:59

## 2024-06-06 ASSESSMENT — COLUMBIA-SUICIDE SEVERITY RATING SCALE - C-SSRS
1. IN THE PAST MONTH, HAVE YOU WISHED YOU WERE DEAD OR WISHED YOU COULD GO TO SLEEP AND NOT WAKE UP?: NO
6. HAVE YOU EVER DONE ANYTHING, STARTED TO DO ANYTHING, OR PREPARED TO DO ANYTHING TO END YOUR LIFE?: NO
2. HAVE YOU ACTUALLY HAD ANY THOUGHTS OF KILLING YOURSELF?: NO

## 2024-06-06 NOTE — ED TRIAGE NOTES
Patient reports abodominal pain, nausea, vomiting, chills, body aches x 1 day. Patient has ostomy. Pt reports decreased out from ostomy. Pt diagnosed with pyelonephritis a week ago. Patient states he has not been able to get his antibiotic from pharmacy.

## 2024-06-07 PROBLEM — E87.29 INCREASED ANION GAP METABOLIC ACIDOSIS: Status: ACTIVE | Noted: 2024-06-07

## 2024-06-07 LAB
ALBUMIN SERPL BCP-MCNC: 3.9 G/DL (ref 3.4–5)
ALBUMIN SERPL BCP-MCNC: 4.9 G/DL (ref 3.4–5)
ANION GAP BLDV CALCULATED.4IONS-SCNC: 11 MMOL/L (ref 10–25)
ANION GAP BLDV CALCULATED.4IONS-SCNC: 11 MMOL/L (ref 10–25)
ANION GAP SERPL CALC-SCNC: 16 MMOL/L (ref 10–20)
ANION GAP SERPL CALC-SCNC: 19 MMOL/L (ref 10–20)
BASE EXCESS BLDV CALC-SCNC: -1.8 MMOL/L (ref -2–3)
BASE EXCESS BLDV CALC-SCNC: 0.1 MMOL/L (ref -2–3)
BASOPHILS # BLD AUTO: 0.09 X10*3/UL (ref 0–0.1)
BASOPHILS NFR BLD AUTO: 0.3 %
BODY TEMPERATURE: 37 DEGREES CELSIUS
BODY TEMPERATURE: 37 DEGREES CELSIUS
BUN SERPL-MCNC: 23 MG/DL (ref 6–23)
BUN SERPL-MCNC: 28 MG/DL (ref 6–23)
CA-I BLDV-SCNC: 1.15 MMOL/L (ref 1.1–1.33)
CA-I BLDV-SCNC: 1.18 MMOL/L (ref 1.1–1.33)
CALCIUM SERPL-MCNC: 12 MG/DL (ref 8.6–10.6)
CALCIUM SERPL-MCNC: 8.6 MG/DL (ref 8.6–10.6)
CHLORIDE BLDV-SCNC: 101 MMOL/L (ref 98–107)
CHLORIDE BLDV-SCNC: 99 MMOL/L (ref 98–107)
CHLORIDE SERPL-SCNC: 102 MMOL/L (ref 98–107)
CHLORIDE SERPL-SCNC: 95 MMOL/L (ref 98–107)
CO2 SERPL-SCNC: 21 MMOL/L (ref 21–32)
CO2 SERPL-SCNC: 24 MMOL/L (ref 21–32)
CREAT SERPL-MCNC: 2.12 MG/DL (ref 0.5–1.3)
CREAT SERPL-MCNC: 3 MG/DL (ref 0.5–1.3)
EGFRCR SERPLBLD CKD-EPI 2021: 27 ML/MIN/1.73M*2
EGFRCR SERPLBLD CKD-EPI 2021: 42 ML/MIN/1.73M*2
EOSINOPHIL # BLD AUTO: 0.01 X10*3/UL (ref 0–0.7)
EOSINOPHIL NFR BLD AUTO: 0 %
ERYTHROCYTE [DISTWIDTH] IN BLOOD BY AUTOMATED COUNT: 12.9 % (ref 11.5–14.5)
GLUCOSE BLD MANUAL STRIP-MCNC: 101 MG/DL (ref 74–99)
GLUCOSE BLD MANUAL STRIP-MCNC: 106 MG/DL (ref 74–99)
GLUCOSE BLDV-MCNC: 103 MG/DL (ref 74–99)
GLUCOSE BLDV-MCNC: 104 MG/DL (ref 74–99)
GLUCOSE SERPL-MCNC: 71 MG/DL (ref 74–99)
GLUCOSE SERPL-MCNC: 84 MG/DL (ref 74–99)
HCO3 BLDV-SCNC: 23.7 MMOL/L (ref 22–26)
HCO3 BLDV-SCNC: 26.9 MMOL/L (ref 22–26)
HCT VFR BLD AUTO: 45 % (ref 41–52)
HCT VFR BLD EST: 41 % (ref 41–52)
HCT VFR BLD EST: 44 % (ref 41–52)
HGB BLD-MCNC: 15.9 G/DL (ref 13.5–17.5)
HGB BLDV-MCNC: 13.8 G/DL (ref 13.5–17.5)
HGB BLDV-MCNC: 14.6 G/DL (ref 13.5–17.5)
HOLD SPECIMEN: NORMAL
IMM GRANULOCYTES # BLD AUTO: 0.14 X10*3/UL (ref 0–0.7)
IMM GRANULOCYTES NFR BLD AUTO: 0.5 % (ref 0–0.9)
INHALED O2 CONCENTRATION: 21 %
INHALED O2 CONCENTRATION: 21 %
LACTATE BLDV-SCNC: 2 MMOL/L (ref 0.4–2)
LACTATE BLDV-SCNC: 2.1 MMOL/L (ref 0.4–2)
LACTATE BLDV-SCNC: 3.8 MMOL/L (ref 0.4–2)
LACTATE SERPL-SCNC: 2.1 MMOL/L (ref 0.4–2)
LACTATE SERPL-SCNC: 2.2 MMOL/L (ref 0.4–2)
LYMPHOCYTES # BLD AUTO: 4.1 X10*3/UL (ref 1.2–4.8)
LYMPHOCYTES NFR BLD AUTO: 15 %
MAGNESIUM SERPL-MCNC: 2.27 MG/DL (ref 1.6–2.4)
MCH RBC QN AUTO: 27.6 PG (ref 26–34)
MCHC RBC AUTO-ENTMCNC: 35.3 G/DL (ref 32–36)
MCV RBC AUTO: 78 FL (ref 80–100)
MONOCYTES # BLD AUTO: 1.6 X10*3/UL (ref 0.1–1)
MONOCYTES NFR BLD AUTO: 5.9 %
NEUTROPHILS # BLD AUTO: 21.37 X10*3/UL (ref 1.2–7.7)
NEUTROPHILS NFR BLD AUTO: 78.3 %
NRBC BLD-RTO: 0 /100 WBCS (ref 0–0)
OXYHGB MFR BLDV: 43.5 % (ref 45–75)
OXYHGB MFR BLDV: 74.1 % (ref 45–75)
PCO2 BLDV: 42 MM HG (ref 41–51)
PCO2 BLDV: 51 MM HG (ref 41–51)
PH BLDV: 7.33 PH (ref 7.33–7.43)
PH BLDV: 7.36 PH (ref 7.33–7.43)
PHOSPHATE SERPL-MCNC: 4.8 MG/DL (ref 2.5–4.9)
PHOSPHATE SERPL-MCNC: 6.7 MG/DL (ref 2.5–4.9)
PLATELET # BLD AUTO: 677 X10*3/UL (ref 150–450)
PO2 BLDV: 33 MM HG (ref 35–45)
PO2 BLDV: 49 MM HG (ref 35–45)
POTASSIUM BLDV-SCNC: 3.6 MMOL/L (ref 3.5–5.3)
POTASSIUM BLDV-SCNC: 3.6 MMOL/L (ref 3.5–5.3)
POTASSIUM SERPL-SCNC: 3.8 MMOL/L (ref 3.5–5.3)
POTASSIUM SERPL-SCNC: 4.4 MMOL/L (ref 3.5–5.3)
RBC # BLD AUTO: 5.76 X10*6/UL (ref 4.5–5.9)
SAO2 % BLDV: 44 % (ref 45–75)
SAO2 % BLDV: 76 % (ref 45–75)
SODIUM BLDV-SCNC: 132 MMOL/L (ref 136–145)
SODIUM BLDV-SCNC: 133 MMOL/L (ref 136–145)
SODIUM SERPL-SCNC: 134 MMOL/L (ref 136–145)
SODIUM SERPL-SCNC: 135 MMOL/L (ref 136–145)
WBC # BLD AUTO: 27.3 X10*3/UL (ref 4.4–11.3)

## 2024-06-07 PROCEDURE — 84295 ASSAY OF SERUM SODIUM: CPT

## 2024-06-07 PROCEDURE — 2500000004 HC RX 250 GENERAL PHARMACY W/ HCPCS (ALT 636 FOR OP/ED)

## 2024-06-07 PROCEDURE — 82435 ASSAY OF BLOOD CHLORIDE: CPT | Performed by: STUDENT IN AN ORGANIZED HEALTH CARE EDUCATION/TRAINING PROGRAM

## 2024-06-07 PROCEDURE — 1200000002 HC GENERAL ROOM WITH TELEMETRY DAILY

## 2024-06-07 PROCEDURE — 36415 COLL VENOUS BLD VENIPUNCTURE: CPT | Performed by: STUDENT IN AN ORGANIZED HEALTH CARE EDUCATION/TRAINING PROGRAM

## 2024-06-07 PROCEDURE — 82435 ASSAY OF BLOOD CHLORIDE: CPT

## 2024-06-07 PROCEDURE — 99291 CRITICAL CARE FIRST HOUR: CPT | Performed by: PODIATRIST

## 2024-06-07 PROCEDURE — 80069 RENAL FUNCTION PANEL: CPT | Performed by: STUDENT IN AN ORGANIZED HEALTH CARE EDUCATION/TRAINING PROGRAM

## 2024-06-07 PROCEDURE — 83605 ASSAY OF LACTIC ACID: CPT | Performed by: STUDENT IN AN ORGANIZED HEALTH CARE EDUCATION/TRAINING PROGRAM

## 2024-06-07 PROCEDURE — 2500000004 HC RX 250 GENERAL PHARMACY W/ HCPCS (ALT 636 FOR OP/ED): Performed by: STUDENT IN AN ORGANIZED HEALTH CARE EDUCATION/TRAINING PROGRAM

## 2024-06-07 PROCEDURE — 2500000001 HC RX 250 WO HCPCS SELF ADMINISTERED DRUGS (ALT 637 FOR MEDICARE OP): Performed by: STUDENT IN AN ORGANIZED HEALTH CARE EDUCATION/TRAINING PROGRAM

## 2024-06-07 PROCEDURE — 82947 ASSAY GLUCOSE BLOOD QUANT: CPT

## 2024-06-07 PROCEDURE — 83735 ASSAY OF MAGNESIUM: CPT | Performed by: STUDENT IN AN ORGANIZED HEALTH CARE EDUCATION/TRAINING PROGRAM

## 2024-06-07 PROCEDURE — 85025 COMPLETE CBC W/AUTO DIFF WBC: CPT | Performed by: STUDENT IN AN ORGANIZED HEALTH CARE EDUCATION/TRAINING PROGRAM

## 2024-06-07 RX ORDER — ONDANSETRON HYDROCHLORIDE 2 MG/ML
4 INJECTION, SOLUTION INTRAVENOUS EVERY 6 HOURS PRN
Status: DISCONTINUED | OUTPATIENT
Start: 2024-06-07 | End: 2024-06-09 | Stop reason: HOSPADM

## 2024-06-07 RX ORDER — HYDROMORPHONE HYDROCHLORIDE 1 MG/ML
0.5 INJECTION, SOLUTION INTRAMUSCULAR; INTRAVENOUS; SUBCUTANEOUS
Status: DISCONTINUED | OUTPATIENT
Start: 2024-06-07 | End: 2024-06-09

## 2024-06-07 RX ORDER — HYDROXYZINE HYDROCHLORIDE 25 MG/1
25 TABLET, FILM COATED ORAL 3 TIMES DAILY PRN
Status: DISCONTINUED | OUTPATIENT
Start: 2024-06-07 | End: 2024-06-09 | Stop reason: HOSPADM

## 2024-06-07 RX ADMIN — HYDROXYZINE HYDROCHLORIDE 25 MG: 25 TABLET, FILM COATED ORAL at 01:05

## 2024-06-07 RX ADMIN — HYDROXYZINE HYDROCHLORIDE 25 MG: 25 TABLET, FILM COATED ORAL at 20:54

## 2024-06-07 RX ADMIN — SODIUM CHLORIDE, POTASSIUM CHLORIDE, SODIUM LACTATE AND CALCIUM CHLORIDE 1000 ML: 600; 310; 30; 20 INJECTION, SOLUTION INTRAVENOUS at 03:47

## 2024-06-07 RX ADMIN — HYDROMORPHONE HYDROCHLORIDE 0.5 MG: 1 INJECTION, SOLUTION INTRAMUSCULAR; INTRAVENOUS; SUBCUTANEOUS at 01:05

## 2024-06-07 RX ADMIN — PIPERACILLIN SODIUM AND TAZOBACTAM SODIUM 3.38 G: 3; .375 INJECTION, SOLUTION INTRAVENOUS at 08:12

## 2024-06-07 RX ADMIN — PIPERACILLIN SODIUM AND TAZOBACTAM SODIUM 2.25 G: 2; .25 INJECTION, SOLUTION INTRAVENOUS at 20:46

## 2024-06-07 RX ADMIN — PIPERACILLIN SODIUM AND TAZOBACTAM SODIUM 3.38 G: 3; .375 INJECTION, SOLUTION INTRAVENOUS at 03:47

## 2024-06-07 RX ADMIN — HYDROMORPHONE HYDROCHLORIDE 0.5 MG: 1 INJECTION, SOLUTION INTRAMUSCULAR; INTRAVENOUS; SUBCUTANEOUS at 10:35

## 2024-06-07 RX ADMIN — PIPERACILLIN SODIUM AND TAZOBACTAM SODIUM 2.25 G: 2; .25 INJECTION, SOLUTION INTRAVENOUS at 14:59

## 2024-06-07 RX ADMIN — HYDROMORPHONE HYDROCHLORIDE 0.5 MG: 1 INJECTION, SOLUTION INTRAMUSCULAR; INTRAVENOUS; SUBCUTANEOUS at 20:54

## 2024-06-07 SDOH — SOCIAL STABILITY: SOCIAL INSECURITY: HAVE YOU HAD THOUGHTS OF HARMING ANYONE ELSE?: NO

## 2024-06-07 SDOH — ECONOMIC STABILITY: TRANSPORTATION INSECURITY
IN THE PAST 12 MONTHS, HAS LACK OF TRANSPORTATION KEPT YOU FROM MEETINGS, WORK, OR FROM GETTING THINGS NEEDED FOR DAILY LIVING?: NO

## 2024-06-07 SDOH — SOCIAL STABILITY: SOCIAL INSECURITY: HAVE YOU HAD ANY THOUGHTS OF HARMING ANYONE ELSE?: NO

## 2024-06-07 SDOH — ECONOMIC STABILITY: INCOME INSECURITY: IN THE PAST 12 MONTHS, HAS THE ELECTRIC, GAS, OIL, OR WATER COMPANY THREATENED TO SHUT OFF SERVICE IN YOUR HOME?: NO

## 2024-06-07 SDOH — ECONOMIC STABILITY: HOUSING INSECURITY
IN THE LAST 12 MONTHS, WAS THERE A TIME WHEN YOU DID NOT HAVE A STEADY PLACE TO SLEEP OR SLEPT IN A SHELTER (INCLUDING NOW)?: NO

## 2024-06-07 SDOH — SOCIAL STABILITY: SOCIAL INSECURITY: ARE THERE ANY APPARENT SIGNS OF INJURIES/BEHAVIORS THAT COULD BE RELATED TO ABUSE/NEGLECT?: NO

## 2024-06-07 SDOH — SOCIAL STABILITY: SOCIAL INSECURITY: ABUSE: ADULT

## 2024-06-07 SDOH — ECONOMIC STABILITY: FOOD INSECURITY: WITHIN THE PAST 12 MONTHS, THE FOOD YOU BOUGHT JUST DIDN'T LAST AND YOU DIDN'T HAVE MONEY TO GET MORE.: NEVER TRUE

## 2024-06-07 SDOH — SOCIAL STABILITY: SOCIAL INSECURITY: DO YOU FEEL UNSAFE GOING BACK TO THE PLACE WHERE YOU ARE LIVING?: NO

## 2024-06-07 SDOH — SOCIAL STABILITY: SOCIAL INSECURITY: HAS ANYONE EVER THREATENED TO HURT YOUR FAMILY OR YOUR PETS?: NO

## 2024-06-07 SDOH — ECONOMIC STABILITY: INCOME INSECURITY: IN THE LAST 12 MONTHS, WAS THERE A TIME WHEN YOU WERE NOT ABLE TO PAY THE MORTGAGE OR RENT ON TIME?: NO

## 2024-06-07 SDOH — SOCIAL STABILITY: SOCIAL INSECURITY: ARE YOU OR HAVE YOU BEEN THREATENED OR ABUSED PHYSICALLY, EMOTIONALLY, OR SEXUALLY BY ANYONE?: NO

## 2024-06-07 SDOH — ECONOMIC STABILITY: INCOME INSECURITY: HOW HARD IS IT FOR YOU TO PAY FOR THE VERY BASICS LIKE FOOD, HOUSING, MEDICAL CARE, AND HEATING?: NOT VERY HARD

## 2024-06-07 SDOH — SOCIAL STABILITY: SOCIAL INSECURITY: DOES ANYONE TRY TO KEEP YOU FROM HAVING/CONTACTING OTHER FRIENDS OR DOING THINGS OUTSIDE YOUR HOME?: NO

## 2024-06-07 SDOH — ECONOMIC STABILITY: FOOD INSECURITY: WITHIN THE PAST 12 MONTHS, YOU WORRIED THAT YOUR FOOD WOULD RUN OUT BEFORE YOU GOT MONEY TO BUY MORE.: NEVER TRUE

## 2024-06-07 SDOH — ECONOMIC STABILITY: TRANSPORTATION INSECURITY
IN THE PAST 12 MONTHS, HAS THE LACK OF TRANSPORTATION KEPT YOU FROM MEDICAL APPOINTMENTS OR FROM GETTING MEDICATIONS?: NO

## 2024-06-07 SDOH — SOCIAL STABILITY: SOCIAL INSECURITY: DO YOU FEEL ANYONE HAS EXPLOITED OR TAKEN ADVANTAGE OF YOU FINANCIALLY OR OF YOUR PERSONAL PROPERTY?: NO

## 2024-06-07 SDOH — SOCIAL STABILITY: SOCIAL INSECURITY: WERE YOU ABLE TO COMPLETE ALL THE BEHAVIORAL HEALTH SCREENINGS?: YES

## 2024-06-07 ASSESSMENT — ACTIVITIES OF DAILY LIVING (ADL)
HEARING - LEFT EAR: FUNCTIONAL
FEEDING YOURSELF: INDEPENDENT
TOILETING: INDEPENDENT
LACK_OF_TRANSPORTATION: NO
WALKS IN HOME: INDEPENDENT
ADEQUATE_TO_COMPLETE_ADL: YES
GROOMING: INDEPENDENT
BATHING: INDEPENDENT
PATIENT'S MEMORY ADEQUATE TO SAFELY COMPLETE DAILY ACTIVITIES?: YES
DRESSING YOURSELF: INDEPENDENT
JUDGMENT_ADEQUATE_SAFELY_COMPLETE_DAILY_ACTIVITIES: YES
HEARING - RIGHT EAR: FUNCTIONAL

## 2024-06-07 ASSESSMENT — COGNITIVE AND FUNCTIONAL STATUS - GENERAL
PATIENT BASELINE BEDBOUND: NO
MOBILITY SCORE: 24
DAILY ACTIVITIY SCORE: 24

## 2024-06-07 ASSESSMENT — PAIN - FUNCTIONAL ASSESSMENT
PAIN_FUNCTIONAL_ASSESSMENT: 0-10

## 2024-06-07 ASSESSMENT — LIFESTYLE VARIABLES
SKIP TO QUESTIONS 9-10: 1
HOW OFTEN DO YOU HAVE A DRINK CONTAINING ALCOHOL: NEVER
SUBSTANCE_ABUSE_PAST_12_MONTHS: YES
PRESCIPTION_ABUSE_PAST_12_MONTHS: NO
AUDIT-C TOTAL SCORE: 0
HOW OFTEN DO YOU HAVE 6 OR MORE DRINKS ON ONE OCCASION: NEVER
AUDIT-C TOTAL SCORE: 0
HOW MANY STANDARD DRINKS CONTAINING ALCOHOL DO YOU HAVE ON A TYPICAL DAY: PATIENT DOES NOT DRINK

## 2024-06-07 ASSESSMENT — PAIN SCALES - GENERAL
PAINLEVEL_OUTOF10: 0 - NO PAIN
PAINLEVEL_OUTOF10: 7
PAINLEVEL_OUTOF10: 8
PAINLEVEL_OUTOF10: 0 - NO PAIN
PAINLEVEL_OUTOF10: 8

## 2024-06-07 ASSESSMENT — PATIENT HEALTH QUESTIONNAIRE - PHQ9
2. FEELING DOWN, DEPRESSED OR HOPELESS: NOT AT ALL
SUM OF ALL RESPONSES TO PHQ9 QUESTIONS 1 & 2: 0
1. LITTLE INTEREST OR PLEASURE IN DOING THINGS: NOT AT ALL

## 2024-06-07 ASSESSMENT — PAIN DESCRIPTION - LOCATION
LOCATION: ABDOMEN
LOCATION: ABDOMEN

## 2024-06-07 NOTE — SIGNIFICANT EVENT
ICU to [facility] Transfer Summary     I:  ICU Admission Reason & Brief ICU Course:      Ingrid Maurice is a 33 y.o. 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 In the ED, CXR negative for acute pulmonary process. CT AP showd Stable postsurgical change related to total colectomy and right lower quadrant ileostomy as well as ileoanal pouch. No bowel obstruction. Stable postsurgical change related to right nephrectomy.  High-density material layering within the gallbladder lumen,  likely sludge. Labs notable for K 6.9, Bicarb 10, Cr 4.19, WBC 25.4. Received 3L LR in ED. Admitted to MICU for FABRICIO and metabolic acidosis. He was started on Zosyn (6/7-  while blood cultures and UA culture pend. Patient's metabolic acidosis resolved along with his nausea and vomiting. He tolerated po intake and was  medically stable for regular nursing floor.    To do/ Follow up:  - FABRICIO   [ ] UA Cx 6/6: in process   [ ] Blood Cultures 6/6: in progress   -On Zosyn; deescalate as appropriate   -Leukocytosis     C: Code Status/DPOA Info/Goals of Care/ACP Note    Full Code  DPOA/Contact Number: Alea (girlfriend) [phone]     U: Unprescribing & Pertinent High-Risk Medications    Changes to home meds: None     Anticoagulation: No Reason for no VTE prophylaxis: Low risk     Antibiotics:   [] N/A - no current planned antimicrobioals  [x]  Zosyn  indication Leukocytosis  start date 6/7 planned duration pending culture results of blood and urine along WBC    P: Pending Tests at the Time of Transfer   None       A: Active consultants, including Rehab:   []  Subspecialty Consultants:  None   []  PT  []  OT  []  SLP  []  Wound Care    U: Uncertainty Measure/Diagnostic Pause:    Working diagnosis at the time of transfer FABRICIO , ?UTI,  sepsis     Diagnosis Degree of Certainty: 1. High degree of certainty about the clinical diagnosis.     S: Summary of Major Problems and To-Dos:   #FABRICIO   #Leukocytosis     To-do list prior to transfer:  [x]None    See above for Follow up      E: Exam, including Lines/Drains/Airways & Data Review:     Gen: Alert, well appearing, in NAD  Head/Neck: normocephalic, atraumatic, neck w/ FROM, no lymphadenopathy  Eyes: anicteric sclerae, noninjected conjunctivae  Mouth:  MMM, oropharynx without erythema or lesions  Heart: RRR, no murmurs, rubs, or gallops  Lungs: No increased work of breathing, lungs clear bilaterally, no wheezing, crackles, rhonchi  Abdomen: soft, tender to palpation in all 4 quadrants, ND,  good bowel sounds; stoma in place  Musculoskeletal: no joint swelling  Extremities: WWP, cap refill <2sec  Neurologic: Alert, symmetrical facies, phonates clearly, moves all extremities equally, responsive to touch  Psychological: appropriate mood/affect      Difficult airway? N/A  Lines/drains assessed for removal? Yes, describe: PIV     Within 30 minutes of the patient physically leaving the floor, a Floor Readiness Note needs to be placed with updated vitals.

## 2024-06-07 NOTE — H&P
"History Of Present Illness  Ingrid Maurice is a 33 y.o. 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    Patient presented 5/26 to ED with fever and abdominal pain. He was treated with IV ceftriaxone and later zosyn given hx of ESBL. Per notes \"history of ESBL E. coli resistant to E. coli in 2021 and 2022\" however these cultures are not available for view in our system.  CT did not show evidence of pyelonephritis. Urine and blood culture 5/26 with no growth and repeat urine culture 5/27 with no growth. He was discharged with levaquin 250mg daily for 7 days however reports he never picked this up.      Pt on arrival to MICU reports 1 day of poor PO intake, nausea, and vomiting. Prior to that he says he was in his usual state of health. He reports acute on chronic abdominal pain, generalized. He says this happens to him from time to time and has had prior hospitalizations for similar presentations. He reports feeling much better after receiving fluids and zofran in the ED. He is eager to have some water to drink and try eating some food. He denies any fever. He feels hot/cold after throwing up. No chest pain, sob, lightheadedness/dizziness. He has decreased urination but no dysuria or hematuria. He reports decreased ostomy output over the last day no bloody output. He reports some muscle cramping at home.     Past Medical History  Past Medical History:   Diagnosis Date    Hemorrhage of anus and rectum     Hemorrhage of rectum and anus       Surgical History  Past Surgical History:   Procedure Laterality Date    ABDOMINAL ADHESION SURGERY  10/14/2013    Laparoscopic Lysis Of Intestinal Adhesions    CT GUIDED PERCUTANEOUS PERITONEAL OR RETROPERITONEAL " FLUID COLLECTION DRAINAGE  7/3/2018    CT GUIDED PERCUTANEOUS PERITONEAL OR RETROPERITONEAL FLUID COLLECTION DRAINAGE 7/3/2018 [facility] CLINICAL LEGACY    EXPLORATORY LAPAROTOMY  10/14/2013    Exploratory Laparotomy    IR GI TUBE EXCHANGE  1/8/2013    IR GI TUBE EXCHANGE 1/8/2013 [facility]    IR GI TUBE EXCHANGE  3/13/2013    IR GI TUBE EXCHANGE 3/13/2013 [facility] CLINICAL LEGACY    IR GI TUBE EXCHANGE  4/12/2013    IR GI TUBE EXCHANGE 4/12/2013 [facility]ERGENCY [facility]GAC[facility]    IR GI TUBE EXCHANGE  6/19/2013    IR GI TUBE EXCHANGE 6/19/2013 [facility] CLINICAL LEGACY    IR GI TUBE EXCHANGE  7/24/2013    IR GI TUBE EXCHANGE 7/24/2013 [facility] CLINICAL LEGACY    IR GI TUBE EXCHANGE  11/25/2013    IR GI TUBE EXCHANGE 11/25/2013 [facility] CLINICAL LEGACY    IR GI TUBE EXCHANGE  3/6/2014    IR GI TUBE EXCHANGE 3/6/2014 [facility] CLINICAL LEGACY    IR GI TUBE EXCHANGE  5/2/2014    IR GI TUBE EXCHANGE 5/2/2014 [facility] CLINICAL LEGACY    IR GI TUBE EXCHANGE  6/9/2014    IR GI TUBE EXCHANGE 6/9/2014 [facility]    IR GI TUBE EXCHANGE  7/14/2014    IR GI TUBE EXCHANGE 7/14/2014 [facility] CLINICAL LEGACY    IR GI TUBE EXCHANGE  8/18/2014    IR GI TUBE EXCHANGE 8/18/2014 [facility] CLINICAL LEGAC[facility]    IR GI TUBE EXCHANGE  10/7/2014    IR GI TUBE EXCHANGE 10/7/2014 [facility]    IR GI TUBE EXCHANGE  11/30/2014    IR GI TUBE EXCHANGE 11/30/2014 [facility] CLINICAL LEGACY    IR GI TUBE EXCHANGE  12/30/2014    IR GI TUBE EXCHANGE 12/30/2014 [facility]    IR GI TUBE EXCHANGE  4/8/2015    IR GI TUBE EXCHANGE 4/8/2015 [facility]    IR GI TUBE EXCHANGE  6/5/2015    IR GI TUBE EXCHANGE 6/5/2015 [facility]    IR GI TUBE EXCHANGE  8/3/2015    IR GI TUBE EXCHANGE 8/3/2015 [facility] CLINICAL LEGAC[facility]    IR GI TUBE EXCHANGE  11/25/2015    IR GI TUBE EXCHANGE 11/25/2015 [facility]    IR GI TUBE EXCHANGE  12/28/2015    IR GI TUBE EXCHANGE 12/28/2015 [facility] CLINICAL LEGACY    IR GI TUBE EXCHANGE  3/21/2012    IR GI TUBE EXCHANGE 3/21/2012 [facility]    IR GI TUBE EXCHANGE   5/24/2012    IR GI TUBE EXCHANGE 5/24/2012 [facility]    IR GI TUBE EXCHANGE  8/7/2012    IR GI TUBE EXCHANGE 8/7/2012 [facility]    IR GI TUBE EXCHANGE  9/5/2012    IR GI TUBE EXCHANGE 9/5/2012 [facility]    IR GI TUBE EXCHANGE  11/5/2012    IR GI TUBE EXCHANGE 11/5/2012 [facility]    IR NEPHROSTOMY TUBE EXCHANGE  1/8/2013    IR NEPHROSTOMY TUBE EXCHANGE 1/8/2013 [facility]    IR NEPHROSTOMY TUBE EXCHANGE  3/13/2013    IR NEPHROSTOMY TUBE EXCHANGE 3/13/2013 [facility] CL[facility]ICAL LEGAC[facility]    IR NEPHROSTOMY TUBE EXCHANGE  4/12/2013    IR NEPHROSTOMY TUBE EXCHANGE 4/12/2013 [facility]    IR NEPHROSTOMY TUBE EXCHANGE  6/19/2013    IR NEPHROSTOMY TUBE EXCHANGE 6/19/2013 [facility] CL[facility]ICAL LEGAC[facility]    IR NEPHROSTOMY TUBE EXCHANGE  7/24/2013    IR NEPHROSTOMY TUBE EXCHANGE 7/24/2013 [facility] CL[facility]ICAL LEGAC[facility]    IR NEPHROSTOMY TUBE EXCHANGE  11/25/2013    IR NEPHROSTOMY TUBE EXCHANGE 11/25/2013 [facility] CL[facility]ICAL LEGAC[facility]    IR NEPHROSTOMY TUBE EXCHANGE  3/6/2014    IR NEPHROSTOMY TUBE EXCHANGE 3/6/2014 [facility] CL[facility]ICAL LEGAC[facility]    IR NEPHROSTOMY TUBE EXCHANGE  5/2/2014    IR NEPHROSTOMY TUBE EXCHANGE 5/2/2014 [facility] CL[facility]ICAL LEGAC[facility]    IR NEPHROSTOMY TUBE EXCHANGE  6/9/2014    IR NEPHROSTOMY TUBE EXCHANGE 6/9/2014 [facility]    IR NEPHROSTOMY TUBE EXCHANGE  7/14/2014    IR NEPHROSTOMY TUBE EXCHANGE 7/14/2014 [facility] CL[facility]ICAL [facility]GAC[facility]    IR NEPHROSTOMY TUBE EXCHANGE  8/18/2014    IR NEPHROSTOMY TUBE EXCHANGE 8/18/2014 [facility] CL[facility]ICAL LEGAC[facility]    IR NEPHROSTOMY TUBE EXCHANGE  10/7/2014    IR NEPHROSTOMY TUBE EXCHANGE 10/7/2014 [facility]    IR NEPHROSTOMY TUBE EXCHANGE  11/30/2014    IR NEPHROSTOMY TUBE EXCHANGE 11/30/2014 [facility] CL[facility]ICAL [facility]GAC[facility]    IR NEPHROSTOMY TUBE EXCHANGE  12/30/2014    IR NEPHROSTOMY TUBE EXCHANGE 12/30/2014 [facility]    IR NEPHROSTOMY TUBE EXCHANGE  4/8/2015    IR NEPHROSTOMY TUBE EXCHANGE 4/8/2015 [facility]    IR NEPHROSTOMY TUBE EXCHANGE  6/5/2015    IR NEPHROSTOMY TUBE EXCHANGE  6/5/2015 [facility]    IR NEPHROSTOMY TUBE EXCHANGE  8/3/2015    IR NEPHROSTOMY TUBE EXCHANGE 8/3/2015 [facility] CLINICAL LEGACY    IR NEPHROSTOMY TUBE EXCHANGE  10/1/2015    IR NEPHROSTOMY TUBE EXCHANGE 10/1/2015 [facility]    IR NEPHROSTOMY TUBE EXCHANGE  11/25/2015    IR NEPHROSTOMY TUBE EXCHANGE 11/25/2015 [facility]    IR NEPHROSTOMY TUBE EXCHANGE  12/28/2015    IR NEPHROSTOMY TUBE EXCHANGE 12/28/2015 [facility] CLINICAL LEGACY    IR NEPHROSTOMY TUBE EXCHANGE  1/28/2016    IR NEPHROSTOMY TUBE EXCHANGE 1/28/2016 [facility]    IR NEPHROSTOMY TUBE EXCHANGE  1/10/2018    IR NEPHROSTOMY TUBE EXCHANGE 1/10/2018 [facility]    IR NEPHROSTOMY TUBE EXCHANGE  4/30/2018    IR NEPHROSTOMY TUBE EXCHANGE 4/30/2018 [facility]    IR NEPHROSTOMY TUBE EXCHANGE  5/22/2018    IR NEPHROSTOMY TUBE EXCHANGE 5/22/2018 [facility]    IR NEPHROSTOMY TUBE EXCHANGE  1/31/2017    IR NEPHROSTOMY TUBE EXCHANGE 1/31/2017 [facility]    IR NEPHROSTOMY TUBE EXCHANGE  3/15/2017    IR NEPHROSTOMY TUBE EXCHANGE 3/15/2017 [facility]    IR NEPHROSTOMY TUBE EXCHANGE  4/19/2017    IR NEPHROSTOMY TUBE EXCHANGE 4/19/2017 [facility]    IR NEPHROSTOMY TUBE EXCHANGE  5/18/2017    IR NEPHROSTOMY TUBE EXCHANGE 5/18/2017 [facility]    IR NEPHROSTOMY TUBE EXCHANGE  6/29/2017    IR NEPHROSTOMY TUBE EXCHANGE 6/29/2017 [facility]    IR NEPHROSTOMY TUBE EXCHANGE  8/17/2017    IR NEPHROSTOMY TUBE EXCHANGE 8/17/2017 [facility]    IR NEPHROSTOMY TUBE EXCHANGE  10/13/2017    IR NEPHROSTOMY TUBE EXCHANGE 10/13/2017 [facility]    IR NEPHROSTOMY TUBE EXCHANGE  3/21/2012    IR NEPHROSTOMY TUBE EXCHANGE 3/21/2012 [facility]    IR NEPHROSTOMY TUBE EXCHANGE  5/24/2012    IR NEPHROSTOMY TUBE EXCHANGE 5/24/2012 [facility]    IR NEPHROSTOMY TUBE EXCHANGE  8/7/2012    IR NEPHROSTOMY TUBE EXCHANGE 8/7/2012 [facility]    IR NEPHROSTOMY TUBE EXCHANGE  9/5/2012    IR NEPHROSTOMY TUBE EXCHANGE 9/5/2012 [facility]     IR NEPHROSTOMY TUBE EXCHANGE  11/5/2012    IR NEPHROSTOMY TUBE EXCHANGE 11/5/2012 [facility] CLINICAL LEGACY    OTHER SURGICAL HISTORY  10/14/2013    Laparos Total Colectomy W/ Proctectomy, Ileoanal Anastomosis    OTHER SURGICAL HISTORY  10/14/2013    [place] Pouch Endoscopy    OTHER SURGICAL HISTORY  10/14/2013    Ileostomy Non-Tube    OTHER SURGICAL HISTORY  10/14/2013    Ileostomy Revision Complex    US GUIDED PERCUTANEOUS PERITONEAL OR RETROPERITONEAL FLUID COLLECTION DRAINAGE  1/2/2015    US GUIDED PERCUTANEOUS PERITONEAL OR RETROPERITONEAL FLUID COLLECTION DRAINAGE 1/2/2015 [facility] EMERGENCY LEGACY        Social History  He reports that he has never smoked. He has never used smokeless tobacco. No history on file for alcohol use and drug use.  Reports marijuana use for pain, nausea, and appetite stimulation     Family History  No family history on file.     Allergies  Coconut and Hydrocodone-acetaminophen    ROS:  - CONSTITUTIONAL: Denies fever and chills.  - HEENT: Denies changes in vision and hearing.  - RESPIRATORY: Denies SOB and cough.  - CV: Denies palpitations and chest pain.  - GI: + abdominal pain, nausea, vomiting.   - : Denies dysuria and urinary frequency. +decreased urination   - MSK: Denies myalgia and joint pain.  - SKIN: Denies rash and pruritus.  - NEUROLOGICAL: Denies headache and syncope.  - PSYCHIATRIC: Denies recent changes in mood. Denies anxiety and depression      Physical Exam:  General:  alert, conversant, in no apparent distress  HEENT: normocephalic, atraumatic, EOMI, no scleral icterus  CV: RRR, no murmurs  Pulm: CTAB, no wheezes or crackles, no increased work of breathing  Abd: soft, tender to palpation in all quadrants, non distended, stoma in place with dark bilious output   : no combs   Ext: warm, no lower extremity edema  Skin: no rashes  Neuro: moving all extremities  Psych: normal affect       Last Recorded Vitals  Blood pressure (!) 123/98, pulse 95, temperature 36.7 °C (98.1 °F),  resp. rate 16, SpO2 100%.    Relevant Results  Lab Results   Component Value Date    WBC 25.4 (H) 06/06/2024    HGB 19.6 (H) 06/06/2024    HCT 53.7 (H) 06/06/2024    MCV 78 (L) 06/06/2024     (H) 06/06/2024      Lab Results   Component Value Date    GLUCOSE 120 (H) 06/06/2024    CALCIUM 11.5 (H) 06/06/2024     (L) 06/06/2024    K 6.9 (HH) 06/06/2024    CO2 10 (LL) 06/06/2024    CL 98 06/06/2024    BUN 30 (H) 06/06/2024    CREATININE 4.19 (H) 06/06/2024     Lab Results   Component Value Date    ALT 33 06/06/2024    AST 41 (H) 06/06/2024    ALKPHOS 116 06/06/2024    BILITOT 0.5 06/06/2024       CT abd/pelvis  ABDOMEN/PELVIS:      ABDOMINAL WALL: Right lower quadrant ostomy again noted. Otherwise,  abdominal wall is unremarkable.      LIVER: The liver demonstrates a normal noncontrast attenuation.      BILE DUCTS: No significant intrahepatic or extrahepatic dilatation.      GALLBLADDER: No significant abnormality.      PANCREAS: No significant abnormality.      SPLEEN: No significant abnormality.      ADRENALS: No significant abnormality.      KIDNEYS, URETERS, BLADDER: Status post right nephrectomy without  evidence of soft tissue thickening within the surgical bed. Left  kidney is unremarkable. The bladder wall is normal thickness for  degree of distention.      REPRODUCTIVE ORGANS: No significant abnormality.      VESSELS: No significant abnormality.      RETROPERITONEUM/LYMPH NODES: No enlarged lymph nodes. No acute  retroperitoneal abnormality.      BOWEL/MESENTERY/PERITONEUM: Stomach is unremarkable. Patient is  status post total colectomy with ileal anal pouch and right lower  quadrant ostomy, similar to the prior exam. There are multiple suture  lines in the right lower quadrant and rectosigmoid region. The  anastomosis appear to be intact. No inflammatory bowel wall  thickening or dilatation. Appendix is surgically absent.      No ascites, free air, or fluid collection.          MUSCULOSKELETAL:  No acute osseous abnormality.      IMPRESSION:  Status post total colectomy with ileal anal pouch and right lower  quadrant ostomy. No evidence of obstruction or parastomal herniation.  Status post right nephrectomy without soft tissue thickening in the  surgical bed.       Assessment/Plan   Principal Problem:    Acute renal failure, unspecified acute renal failure type (CMS-HCC)    Ingrid Maurice is a 33 y.o. 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     Neuro:   #Bipolar disorder  -per chart review. Not on home meds     CV:   -HOLA     Pulm:   -HOLA     GI:   #Acute nausea/vomiting   -lipase 92   -CT abd/pelvis without acute changes. 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 Was never hypotensive, no exam findings to suggest hypoperfusion presently  -bicarb 10, suspect 2/2 GI losses  -s/p bicarb 50mEq x 2 in ED     #Hyperkalemia  -K 6.9 with mild hemolysis detected. Treated with insulin in the ED, calcium gluconate     #Hypovolemic hyponatremia   -Na 129, asymptomatic  -Improved to 134 after 2L IVF    #Hypercalcemia  -initial Ca 11.5. Received 4g calcium gluconate in the ED  -if repeat Ca remains high, consider work up  with pth, vitamin D    ID:   #Leukocytosis   #Recent diagnosis of UTI  -diagnosed during hospitalization 5/26-5/28  -urine culture that admission with NGTD. Additionally, CT without evidence of pyelonephritis  -Pt reports he did not take levaquin prescribed on discharge. Denies any urinary symptoms   -Repeat WBC after 2L IVF is 27.3. Unclear source of infection based on symptoms  -Follow up blood cultures   -continue empiric zosyn (?urinary source) while awaiting infectious work up     Heme:   #Leukocytosis  #Polycythemia   #Thrombocytosis  -suspect hemoconcentration in setting of dehydration     F: s/p 2L IVF   E: as above  N: regular diet   GI ppx: not indicated  DVT ppx: low risk, encourage ambulation     Full code  Surrogate decision maker: Alea (girlfriend) [phone]     Akanksha Johnson MD

## 2024-06-07 NOTE — PROGRESS NOTES
Pharmacy Medication History Review    Ingrid Maurice is a 33 y.o. male admitted for Acute renal failure, unspecified acute renal failure type (CMS-[facility]). Pharmacy reviewed the patient's [id number] medications and allergies for accuracy.        The list below reflects the updated PTA list. Comments regarding how patient may be taking medications differently can be found in the Admit Orders Activity  Prior to Admission Medications     The patient currently takes no medications prior to admission.      The list below reflects the updated allergy list. Please review each documented allergy for additional clarification and justification.  Allergies  Reviewed by Maria Dolores Haines RN on 6/7/2024        Severity Reactions Comments    Coconut High Anaphylaxis, Swelling Throat Swelling    Hydrocodone-acetaminophen Not Specified Other, Hives tolerated 09/12 without reaction            Patient accepts M2B at discharge.     Sources used to complete the med history include out patient fill history, OARRS, and patient interview along with discharge summary 5/28/24 Dr. Brian Mattson.       Below are additional concerns with the patient's PTA list.  The patient was discharged on levofloxacin 250 mg daily for 7 days on 5/28/24 but the patient never picked up the medication.     Anibal Liu [facility]  Transitions of Care Clinical Pharmacist  Please reach out via Epic Chat for questions, if no response call  [employer] or [employer]  [facility]

## 2024-06-07 NOTE — PROGRESS NOTES
"Ingrid Maurice is a 33 y.o. male on day 1 of admission presenting with Acute renal failure, unspecified acute renal failure type (CMS-HCC).    Subjective   No acute events overnight. He is feeling better. Denies nausea and vomiting. Has baseline 7/10 abdominal pain.       Objective     VS  /82 (Patient Position: Lying)   Pulse 88   Temp 36.3 °C (97.3 °F) (Temporal)   Resp 21   Ht 1.727 m (5' 8\")   Wt 65.1 kg (143 lb 8.3 oz)   SpO2 100%   BMI 21.82 kg/m²      I/O  Intake/Output Summary (Last 24 hours) at 2024 1331  Last data filed at 2024 0842  Gross per 24 hour   Intake 2500 ml   Output 700 ml   Net 1800 ml        Physical Exam  Gen: Alert, well appearing, in NAD  Head/Neck: normocephalic, atraumatic, neck w/ FROM, no lymphadenopathy  Eyes: anicteric sclerae, noninjected conjunctivae  Mouth:  MMM, oropharynx without erythema or lesions  Heart: RRR, no murmurs, rubs, or gallops  Lungs: No increased work of breathing, lungs clear bilaterally, no wheezing, crackles, rhonchi  Abdomen: soft, tender to palpation in all 4 quadrants, ND,  good bowel sounds; stoma in place  Musculoskeletal: no joint swelling  Extremities: WWP, cap refill <2sec  Neurologic: Alert, symmetrical facies, phonates clearly, moves all extremities equally, responsive to touch  Psychological: appropriate mood/affect      Relevant Results  No current facility-administered medications on file prior to encounter.     Current Outpatient Medications on File Prior to Encounter   Medication Sig Dispense Refill    [] levoFLOXacin (Levaquin) 500 mg tablet Take 0.5 tablets (250 mg) by mouth once daily for 7 days. 4 tablet 0        Results for orders placed or performed during the hospital encounter of 24 (from the past 24 hour(s))   CBC with Differential   Result Value Ref Range    WBC 25.4 (H) 4.4 - 11.3 x10*3/uL    nRBC 0.0 0.0 - 0.0 /100 WBCs    RBC 6.85 (H) 4.50 - 5.90 x10*6/uL    Hemoglobin 19.6 (H) 13.5 - 17.5 g/dL    " Hematocrit 53.7 (H) 41.0 - 52.0 %    MCV 78 (L) 80 - 100 fL    MCH 28.6 26.0 - 34.0 pg    MCHC 36.5 (H) 32.0 - 36.0 g/dL    RDW 14.6 (H) 11.5 - 14.5 %    Platelets 629 (H) 150 - 450 x10*3/uL    Neutrophils % 87.6 40.0 - 80.0 %    Immature Granulocytes %, Automated 0.7 0.0 - 0.9 %    Lymphocytes % 8.2 13.0 - 44.0 %    Monocytes % 3.0 2.0 - 10.0 %    Eosinophils % 0.0 0.0 - 6.0 %    Basophils % 0.5 0.0 - 2.0 %    Neutrophils Absolute 22.28 (H) 1.20 - 7.70 x10*3/uL    Immature Granulocytes Absolute, Automated 0.17 0.00 - 0.70 x10*3/uL    Lymphocytes Absolute 2.09 1.20 - 4.80 x10*3/uL    Monocytes Absolute 0.75 0.10 - 1.00 x10*3/uL    Eosinophils Absolute 0.01 0.00 - 0.70 x10*3/uL    Basophils Absolute 0.12 (H) 0.00 - 0.10 x10*3/uL   Comprehensive Metabolic Panel   Result Value Ref Range    Glucose 120 (H) 74 - 99 mg/dL    Sodium 129 (L) 136 - 145 mmol/L    Potassium 6.9 (HH) 3.5 - 5.3 mmol/L    Chloride 98 98 - 107 mmol/L    Bicarbonate 10 (LL) 21 - 32 mmol/L    Anion Gap 28 (H) 10 - 20 mmol/L    Urea Nitrogen 30 (H) 6 - 23 mg/dL    Creatinine 4.19 (H) 0.50 - 1.30 mg/dL    eGFR 18 (L) >60 mL/min/1.73m*2    Calcium 11.5 (H) 8.6 - 10.6 mg/dL    Albumin 6.0 (H) 3.4 - 5.0 g/dL    Alkaline Phosphatase 116 33 - 120 U/L    Total Protein >12.0 (H) 6.4 - 8.2 g/dL    AST 41 (H) 9 - 39 U/L    Bilirubin, Total 0.5 0.0 - 1.2 mg/dL    ALT 33 10 - 52 U/L   Lipase   Result Value Ref Range    Lipase 92 (H) 9 - 82 U/L   BLOOD GAS VENOUS FULL PANEL   Result Value Ref Range    POCT pH, Venous 7.23 (LL) 7.33 - 7.43 pH    POCT pCO2, Venous 44 41 - 51 mm Hg    POCT pO2, Venous 20 (L) 35 - 45 mm Hg    POCT SO2, Venous 26 (L) 45 - 75 %    POCT Oxy Hemoglobin, Venous 26.2 (L) 45.0 - 75.0 %    POCT Hematocrit Calculated, Venous 59.0 (H) 41.0 - 52.0 %    POCT Sodium, Venous 129 (L) 136 - 145 mmol/L    POCT Potassium, Venous 7.1 (HH) 3.5 - 5.3 mmol/L    POCT Chloride, Venous 100 98 - 107 mmol/L    POCT Ionized Calicum, Venous 1.23 1.10 - 1.33  mmol/L    POCT Glucose, Venous 139 (H) 74 - 99 mg/dL    POCT Lactate, Venous 2.7 (H) 0.4 - 2.0 mmol/L    POCT Base Excess, Venous -9.1 (L) -2.0 - 3.0 mmol/L    POCT HCO3 Calculated, Venous 18.4 (L) 22.0 - 26.0 mmol/L    POCT Hemoglobin, Venous 19.6 (H) 13.5 - 17.5 g/dL    POCT Anion Gap, Venous 18.0 10.0 - 25.0 mmol/L    Patient Temperature 37.0 degrees Celsius    FiO2 21 %   Magnesium   Result Value Ref Range    Magnesium 2.80 (H) 1.60 - 2.40 mg/dL   POCT GLUCOSE   Result Value Ref Range    POCT Glucose 246 (H) 74 - 99 mg/dL   Troponin I, High Sensitivity, Initial   Result Value Ref Range    Troponin I, High Sensitivity <3 0 - 53 ng/L   ECG 12 lead   Result Value Ref Range    Ventricular Rate 131 BPM    Atrial Rate 131 BPM    CA Interval 118 ms    QRS Duration 78 ms    QT Interval 284 ms    QTC Calculation(Bazett) 419 ms    P Axis 82 degrees    R Axis 85 degrees    T Axis 60 degrees    QRS Count 21 beats    Q Onset 223 ms    P Onset 164 ms    P Offset 206 ms    T Offset 365 ms    QTC Fredericia 368 ms   ECG 12 Lead   Result Value Ref Range    Ventricular Rate 93 BPM    Atrial Rate 93 BPM    CA Interval 148 ms    QRS Duration 70 ms    QT Interval 320 ms    QTC Calculation(Bazett) 397 ms    P Axis 82 degrees    R Axis 89 degrees    T Axis 72 degrees    QRS Count 16 beats    Q Onset 224 ms    P Onset 150 ms    P Offset 202 ms    T Offset 384 ms    QTC Fredericia 370 ms   Troponin, High Sensitivity, 1 Hour   Result Value Ref Range    Troponin I, High Sensitivity <3 0 - 53 ng/L   Blood Culture    Specimen: Peripheral Venipuncture; Blood culture   Result Value Ref Range    Blood Culture Loaded on Instrument - Culture in progress    Blood Culture    Specimen: Peripheral Venipuncture; Blood culture   Result Value Ref Range    Blood Culture Loaded on Instrument - Culture in progress    Urinalysis with Reflex Culture and Microscopic   Result Value Ref Range    Color, Urine Light-Orange (N) Light-Yellow, Yellow, Dark-Yellow     Appearance, Urine Ex.Turbid (N) Clear    Specific Gravity, Urine 1.024 1.005 - 1.035    pH, Urine 6.0 5.0, 5.5, 6.0, 6.5, 7.0, 7.5, 8.0    Protein, Urine 300 (3+) (A) NEGATIVE, 10 (TRACE), 20 (TRACE) mg/dL    Glucose, Urine Normal Normal mg/dL    Blood, Urine 0.5 (2+) (A) NEGATIVE    Ketones, Urine NEGATIVE NEGATIVE mg/dL    Bilirubin, Urine NEGATIVE NEGATIVE    Urobilinogen, Urine Normal Normal mg/dL    Nitrite, Urine NEGATIVE NEGATIVE    Leukocyte Esterase, Urine 500 Alice/µL (A) NEGATIVE   Microscopic Only, Urine   Result Value Ref Range    WBC, Urine >50 (A) 1-5, NONE /HPF    RBC, Urine >20 (A) NONE, 1-2, 3-5 /HPF    Squamous Epithelial Cells, Urine 10-25 (FEW) Reference range not established. /HPF    Mucus, Urine 4+ Reference range not established. /LPF    Hyaline Casts, Urine 4+ (A) NONE /LPF   BLOOD GAS VENOUS FULL PANEL   Result Value Ref Range    POCT pH, Venous 7.26 (L) 7.33 - 7.43 pH    POCT pCO2, Venous 51 41 - 51 mm Hg    POCT pO2, Venous 24 (L) 35 - 45 mm Hg    POCT SO2, Venous 28 (L) 45 - 75 %    POCT Oxy Hemoglobin, Venous 28.0 (L) 45.0 - 75.0 %    POCT Hematocrit Calculated, Venous 50.0 41.0 - 52.0 %    POCT Sodium, Venous 131 (L) 136 - 145 mmol/L    POCT Potassium, Venous 5.0 3.5 - 5.3 mmol/L    POCT Chloride, Venous 99 98 - 107 mmol/L    POCT Ionized Calicum, Venous 1.34 (H) 1.10 - 1.33 mmol/L    POCT Glucose, Venous 169 (H) 74 - 99 mg/dL    POCT Lactate, Venous 3.6 (H) 0.4 - 2.0 mmol/L    POCT Base Excess, Venous -4.8 (L) -2.0 - 3.0 mmol/L    POCT HCO3 Calculated, Venous 22.9 22.0 - 26.0 mmol/L    POCT Hemoglobin, Venous 16.5 13.5 - 17.5 g/dL    POCT Anion Gap, Venous 14.0 10.0 - 25.0 mmol/L    Patient Temperature 37.0 degrees Celsius    FiO2 21 %   Blood Gas Lactic Acid, Venous   Result Value Ref Range    POCT Lactate, Venous 4.0 (HH) 0.4 - 2.0 mmol/L   CBC and Auto Differential   Result Value Ref Range    WBC 27.3 (H) 4.4 - 11.3 x10*3/uL    nRBC 0.0 0.0 - 0.0 /100 WBCs    RBC 5.76 4.50 - 5.90  x10*6/uL    Hemoglobin 15.9 13.5 - 17.5 g/dL    Hematocrit 45.0 41.0 - 52.0 %    MCV 78 (L) 80 - 100 fL    MCH 27.6 26.0 - 34.0 pg    MCHC 35.3 32.0 - 36.0 g/dL    RDW 12.9 11.5 - 14.5 %    Platelets 677 (H) 150 - 450 x10*3/uL    Neutrophils % 78.3 40.0 - 80.0 %    Immature Granulocytes %, Automated 0.5 0.0 - 0.9 %    Lymphocytes % 15.0 13.0 - 44.0 %    Monocytes % 5.9 2.0 - 10.0 %    Eosinophils % 0.0 0.0 - 6.0 %    Basophils % 0.3 0.0 - 2.0 %    Neutrophils Absolute 21.37 (H) 1.20 - 7.70 x10*3/uL    Immature Granulocytes Absolute, Automated 0.14 0.00 - 0.70 x10*3/uL    Lymphocytes Absolute 4.10 1.20 - 4.80 x10*3/uL    Monocytes Absolute 1.60 (H) 0.10 - 1.00 x10*3/uL    Eosinophils Absolute 0.01 0.00 - 0.70 x10*3/uL    Basophils Absolute 0.09 0.00 - 0.10 x10*3/uL   Renal Function Panel   Result Value Ref Range    Glucose 71 (L) 74 - 99 mg/dL    Sodium 134 (L) 136 - 145 mmol/L    Potassium 4.4 3.5 - 5.3 mmol/L    Chloride 95 (L) 98 - 107 mmol/L    Bicarbonate 24 21 - 32 mmol/L    Anion Gap 19 10 - 20 mmol/L    Urea Nitrogen 28 (H) 6 - 23 mg/dL    Creatinine 3.00 (H) 0.50 - 1.30 mg/dL    eGFR 27 (L) >60 mL/min/1.73m*2    Calcium 12.0 (H) 8.6 - 10.6 mg/dL    Phosphorus 6.7 (H) 2.5 - 4.9 mg/dL    Albumin 4.9 3.4 - 5.0 g/dL   Magnesium   Result Value Ref Range    Magnesium 2.27 1.60 - 2.40 mg/dL   Lactate   Result Value Ref Range    Lactate 2.1 (H) 0.4 - 2.0 mmol/L   POCT GLUCOSE   Result Value Ref Range    POCT Glucose 106 (H) 74 - 99 mg/dL   BLOOD GAS VENOUS FULL PANEL   Result Value Ref Range    POCT pH, Venous 7.33 7.33 - 7.43 pH    POCT pCO2, Venous 51 41 - 51 mm Hg    POCT pO2, Venous 33 (L) 35 - 45 mm Hg    POCT SO2, Venous 44 (L) 45 - 75 %    POCT Oxy Hemoglobin, Venous 43.5 (L) 45.0 - 75.0 %    POCT Hematocrit Calculated, Venous 41.0 41.0 - 52.0 %    POCT Sodium, Venous 133 (L) 136 - 145 mmol/L    POCT Potassium, Venous 3.6 3.5 - 5.3 mmol/L    POCT Chloride, Venous 99 98 - 107 mmol/L    POCT Ionized Calicum,  Venous 1.18 1.10 - 1.33 mmol/L    POCT Glucose, Venous 103 (H) 74 - 99 mg/dL    POCT Lactate, Venous 2.1 (H) 0.4 - 2.0 mmol/L    POCT Base Excess, Venous 0.1 -2.0 - 3.0 mmol/L    POCT HCO3 Calculated, Venous 26.9 (H) 22.0 - 26.0 mmol/L    POCT Hemoglobin, Venous 13.8 13.5 - 17.5 g/dL    POCT Anion Gap, Venous 11.0 10.0 - 25.0 mmol/L    Patient Temperature 37.0 degrees Celsius    FiO2 21 %   Lactate   Result Value Ref Range    Lactate 2.2 (H) 0.4 - 2.0 mmol/L              Assessment/Plan   Principal Problem:    Acute renal failure, unspecified acute renal failure type (CMS-HCC)    Ingrid Maurice is a 33 y.o. 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 Metabolic acidosis resolved. Patient tolerating po intake. N/V resolved. Hyperkalemia resolved. FABRICIO down-trending.      Neuro:   #Bipolar disorder  -per chart review. Not on home meds      CV:   -HOLA      Pulm:   -HOLA      GI:   #Acute nausea/vomiting- resolved   -lipase 92   -CT abd/pelvis without acute changes. 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 Was never hypotensive, no exam findings to suggest  hypoperfusion presently  -bicarb 10, suspect 2/2 GI losses  -s/p bicarb 50mEq x 2 in ED      #Hyperkalemia- resolved   -K 6.9 with mild hemolysis detected in ED. Treated with insulin in the ED, calcium gluconate   -3.8 now      #Hypovolemic hyponatremia   -Na 129 in ED, asymptomatic   -Improved to 134 after 2L IVF  - Na 135 today      #Hypercalcemia  -initial Ca 11.5. Received 4g calcium gluconate in the ED  -Ca 8.0 today      ID:   #Leukocytosis   #Recent diagnosis of UTI  -diagnosed during hospitalization 5/26-5/28  -urine culture that admission with NGTD. Additionally, CT without evidence of pyelonephritis  -Pt reports he did not take levaquin prescribed on discharge. Denies any urinary symptoms   -Repeat WBC after 2L IVF is 27.3. Unclear source of infection based on symptoms  -Follow up blood cultures   -continue empiric zosyn (?urinary source) while awaiting infectious work up   - WBC today 27.3; was 25.4     Heme:   #Leukocytosis  #Polycythemia   #Thrombocytosis  -suspect hemoconcentration in setting of dehydration      F: s/p 3 L IVF   E: as above  N: regular diet   GI ppx: not indicated  DVT ppx: low risk, encourage ambulation      Full code  Surrogate decision maker: Alea (girlfriend) [phone]     Patient seen and discussed with attending, Dr. Sky.     Sandra Lizarraga MD  PGY1 Family Medicine   [facility] Gold Team

## 2024-06-07 NOTE — PROGRESS NOTES
Social Work Transitional Care Note   - ICU TREATMENT PLAN: Patient was admitted to MICU for FABRICIO and metabolic acidosis.  - P[facility]: [facility]  -Support System: Patient's significant other Briana is listed as NOK.  - Planned Disposition: Pending medical outcome and rehab recommendations  - Additional Information: None at this time.  - Barriers to discharge: None at this time.   LUCITA VALDERRAMA

## 2024-06-07 NOTE — CARE PLAN
The patient's goals for the shift include  Pain and nausea     The clinical goals for the shift include Pt will not be nautious    Over the shift, the patient did not make progress toward the following goals. Barriers to progression include pt had pain managed and was able to tolerate crackers. Continue with current care plan.

## 2024-06-07 NOTE — HOSPITAL COURSE
Ingrid Maurice is a 33 y.o. 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 In the ED, CXR negative for acute pulmonary process. CT AP showd Stable postsurgical change related to total colectomy and right lower quadrant ileostomy as well as ileoanal pouch. No bowel obstruction. Stable postsurgical change related to right nephrectomy.  High-density material layering within the gallbladder lumen, likely sludge. Labs notable for K 6.9, Bicarb 10, Cr 4.19, WBC 25.4. Received 3L LR in ED. Admitted to MICU for FABRICIO and metabolic acidosis. He was started on Zosyn (6/7-  while blood cultures and UA culture pend. Patient's metabolic acidosis resolved along with his nausea and vomiting. He tolerated po intake and was transferred to regular nursing floor. Pts leukocytosis cont. to improve and WBC was 8.7 on discharge. Pts FABRICIO improved and his Cr trended down from 4.19 to 1.49. His pain improved. Pts UA was growing enteric bacilli, with cultures pending. Pt previously had a UA on 3/18/2023 that was resistant to ampicillin and had intermediate susceptibilities to Nitrofurantoin, but was sensitive to other penicillins and flouroquinolones. Pt expressed a wish to leave today if possible, and give his UA hx, pt was transitioned to ciprofloxacin and was discharged with an end date of 6/12. Pt also had stomach cramping still and was willing to try bentyl outpatient.     PCP To Do:  [ ] Repeat RFP to monitor pts Cr to ensure it comes back to baseline, and cont. to evaluate pts potassium  [ ] Check pts symptoms after he completes his course of abx (6/6 - 6/12)  [ ] Ensure pt is having adequate output in his colostomy bag    Of note, pt was discharge on ciprofloxacin but his cultures resulted in e coli that was resistant  to cipro but was sensitive to macrobid. Pt was called and informed of the new macrobid BID script that was [facility] to [facility]. Pt verbalized his understanding and said that he would come and pick it up right away. He was also informed to not take the cipro, and that the end date would still be the same.

## 2024-06-08 LAB
ALBUMIN SERPL BCP-MCNC: 4.1 G/DL (ref 3.4–5)
ANION GAP SERPL CALC-SCNC: 13 MMOL/L (ref 10–20)
BASOPHILS # BLD AUTO: 0.07 X10*3/UL (ref 0–0.1)
BASOPHILS NFR BLD AUTO: 0.6 %
BUN SERPL-MCNC: 24 MG/DL (ref 6–23)
CALCIUM SERPL-MCNC: 9.5 MG/DL (ref 8.6–10.6)
CHLORIDE SERPL-SCNC: 100 MMOL/L (ref 98–107)
CO2 SERPL-SCNC: 26 MMOL/L (ref 21–32)
CREAT SERPL-MCNC: 1.73 MG/DL (ref 0.5–1.3)
EGFRCR SERPLBLD CKD-EPI 2021: 53 ML/MIN/1.73M*2
EOSINOPHIL # BLD AUTO: 0.14 X10*3/UL (ref 0–0.7)
EOSINOPHIL NFR BLD AUTO: 1.2 %
ERYTHROCYTE [DISTWIDTH] IN BLOOD BY AUTOMATED COUNT: 13.1 % (ref 11.5–14.5)
GLUCOSE SERPL-MCNC: 69 MG/DL (ref 74–99)
HCT VFR BLD AUTO: 38.5 % (ref 41–52)
HGB BLD-MCNC: 13.8 G/DL (ref 13.5–17.5)
IMM GRANULOCYTES # BLD AUTO: 0.03 X10*3/UL (ref 0–0.7)
IMM GRANULOCYTES NFR BLD AUTO: 0.2 % (ref 0–0.9)
LYMPHOCYTES # BLD AUTO: 4.48 X10*3/UL (ref 1.2–4.8)
LYMPHOCYTES NFR BLD AUTO: 37.1 %
MAGNESIUM SERPL-MCNC: 1.95 MG/DL (ref 1.6–2.4)
MCH RBC QN AUTO: 28.7 PG (ref 26–34)
MCHC RBC AUTO-ENTMCNC: 35.8 G/DL (ref 32–36)
MCV RBC AUTO: 80 FL (ref 80–100)
MONOCYTES # BLD AUTO: 1.02 X10*3/UL (ref 0.1–1)
MONOCYTES NFR BLD AUTO: 8.4 %
NEUTROPHILS # BLD AUTO: 6.35 X10*3/UL (ref 1.2–7.7)
NEUTROPHILS NFR BLD AUTO: 52.5 %
NRBC BLD-RTO: 0 /100 WBCS (ref 0–0)
PHOSPHATE SERPL-MCNC: 4.1 MG/DL (ref 2.5–4.9)
PLATELET # BLD AUTO: 504 X10*3/UL (ref 150–450)
POTASSIUM SERPL-SCNC: 3.6 MMOL/L (ref 3.5–5.3)
RBC # BLD AUTO: 4.81 X10*6/UL (ref 4.5–5.9)
SODIUM SERPL-SCNC: 135 MMOL/L (ref 136–145)
WBC # BLD AUTO: 12.1 X10*3/UL (ref 4.4–11.3)

## 2024-06-08 PROCEDURE — 2500000004 HC RX 250 GENERAL PHARMACY W/ HCPCS (ALT 636 FOR OP/ED)

## 2024-06-08 PROCEDURE — 85025 COMPLETE CBC W/AUTO DIFF WBC: CPT

## 2024-06-08 PROCEDURE — 36415 COLL VENOUS BLD VENIPUNCTURE: CPT

## 2024-06-08 PROCEDURE — 80069 RENAL FUNCTION PANEL: CPT

## 2024-06-08 PROCEDURE — 99233 SBSQ HOSP IP/OBS HIGH 50: CPT

## 2024-06-08 PROCEDURE — 1210000001 HC SEMI-PRIVATE ROOM DAILY

## 2024-06-08 PROCEDURE — 83735 ASSAY OF MAGNESIUM: CPT

## 2024-06-08 RX ORDER — DEXTROSE 50 % IN WATER (D50W) INTRAVENOUS SYRINGE
12.5
Status: DISCONTINUED | OUTPATIENT
Start: 2024-06-08 | End: 2024-06-09 | Stop reason: HOSPADM

## 2024-06-08 RX ORDER — DEXTROSE 50 % IN WATER (D50W) INTRAVENOUS SYRINGE
25
Status: DISCONTINUED | OUTPATIENT
Start: 2024-06-08 | End: 2024-06-09 | Stop reason: HOSPADM

## 2024-06-08 RX ADMIN — HYDROMORPHONE HYDROCHLORIDE 0.5 MG: 1 INJECTION, SOLUTION INTRAMUSCULAR; INTRAVENOUS; SUBCUTANEOUS at 10:33

## 2024-06-08 RX ADMIN — PIPERACILLIN SODIUM AND TAZOBACTAM SODIUM 2.25 G: 2; .25 INJECTION, SOLUTION INTRAVENOUS at 03:45

## 2024-06-08 RX ADMIN — PIPERACILLIN SODIUM AND TAZOBACTAM SODIUM 2.25 G: 2; .25 INJECTION, SOLUTION INTRAVENOUS at 15:36

## 2024-06-08 RX ADMIN — PIPERACILLIN SODIUM AND TAZOBACTAM SODIUM 2.25 G: 2; .25 INJECTION, SOLUTION INTRAVENOUS at 09:45

## 2024-06-08 RX ADMIN — PIPERACILLIN SODIUM AND TAZOBACTAM SODIUM 2.25 G: 2; .25 INJECTION, SOLUTION INTRAVENOUS at 21:30

## 2024-06-08 RX ADMIN — HYDROMORPHONE HYDROCHLORIDE 0.5 MG: 1 INJECTION, SOLUTION INTRAMUSCULAR; INTRAVENOUS; SUBCUTANEOUS at 21:30

## 2024-06-08 ASSESSMENT — PAIN DESCRIPTION - LOCATION
LOCATION: ABDOMEN
LOCATION: ABDOMEN

## 2024-06-08 ASSESSMENT — PAIN - FUNCTIONAL ASSESSMENT
PAIN_FUNCTIONAL_ASSESSMENT: 0-10

## 2024-06-08 ASSESSMENT — COGNITIVE AND FUNCTIONAL STATUS - GENERAL
DAILY ACTIVITIY SCORE: 24
MOBILITY SCORE: 24

## 2024-06-08 ASSESSMENT — PAIN SCALES - GENERAL
PAINLEVEL_OUTOF10: 7

## 2024-06-08 NOTE — CARE PLAN
The patient's goals for the shift include      The clinical goals for the shift include patient pain level will be below 5 throughout shift  Problem: Pain  Goal: Takes deep breaths with improved pain control throughout the shift  Outcome: Progressing  Goal: Turns in bed with improved pain control throughout the shift  Outcome: Progressing  Goal: Walks with improved pain control throughout the shift  Outcome: Progressing  Goal: Performs ADL's with improved pain control throughout shift  Outcome: Progressing  Goal: Participates in PT with improved pain control throughout the shift  Outcome: Progressing  Goal: Free from opioid side effects throughout the shift  Outcome: Progressing  Goal: Free from acute confusion related to pain meds throughout the shift  Outcome: Progressing

## 2024-06-08 NOTE — PROGRESS NOTES
"  MEDICINE INPATIENT PROGRESS NOTE      Ingrid Maurice is a 33 y.o. male on day 2 of admission presenting with Acute renal failure, unspecified acute renal failure type (CMS-HCC).    Subjective   Patient evaluated at bedside this morning.  He denies any chest pain, SOB, nausea, vomiting however endorses mild chronic abdominal pain.  Denies any significant changes to colostomy output and no signs of infection or erythema. Reports baseline PO intake and asks when he can go home.  He denies any dysuria, urinary frequency, suprapubic tenderness, fever and chills.         Objective     Last Recorded Vitals  Blood pressure 117/75, pulse 85, temperature 35.8 °C (96.4 °F), temperature source Temporal, resp. rate 16, height 1.727 m (5' 8\"), weight 65.1 kg (143 lb 8.3 oz), SpO2 100%.  Intake/Output last 3 Shifts:  I/O last 3 completed shifts:  In: 2650 (40.7 mL/kg) [IV Piggyback:2650]  Out: 700 (10.8 mL/kg) [Urine:700 (0.3 mL/kg/hr)]  Weight: 65.1 kg     Physical Exam  Constitutional:       General: He is not in acute distress.     Appearance: Normal appearance. He is not ill-appearing.   HENT:      Head: Normocephalic and atraumatic.      Nose: Nose normal. No congestion or rhinorrhea.      Mouth/Throat:      Mouth: Mucous membranes are moist.      Pharynx: Oropharynx is clear.   Eyes:      General: No scleral icterus.     Extraocular Movements: Extraocular movements intact.      Conjunctiva/sclera: Conjunctivae normal.      Pupils: Pupils are equal, round, and reactive to light.   Cardiovascular:      Rate and Rhythm: Normal rate and regular rhythm.      Pulses: Normal pulses.      Heart sounds: Normal heart sounds. No murmur heard.     No gallop.   Pulmonary:      Effort: Pulmonary effort is normal. No respiratory distress.      Breath sounds: Normal breath sounds. No wheezing or rales.   Abdominal:      General: Abdomen is flat. Bowel sounds are normal. There is no distension.      Palpations: Abdomen is soft.      " Tenderness: There is abdominal tenderness. There is no guarding.      Comments: Mild epigastric discomfort (chronic). Stoma visible, colostomy bag in place, no signs of infection, skin separation or erythema.   Musculoskeletal:         General: No swelling.      Cervical back: Normal range of motion and neck supple.      Right lower leg: No edema.      Left lower leg: No edema.   Skin:     General: Skin is warm and dry.      Capillary Refill: Capillary refill takes less than 2 seconds.      Coloration: Skin is not jaundiced.      Findings: No lesion or rash.   Neurological:      General: No focal deficit present.      Mental Status: He is alert and oriented to person, place, and time.   Psychiatric:         Mood and Affect: Mood normal.         Behavior: Behavior normal.         Thought Content: Thought content normal.         Relevant Results    Labs    Results from last 7 days   Lab Units 06/08/24  0708 06/07/24  0045 06/06/24  1818   WBC AUTO x10*3/uL 12.1* 27.3* 25.4*   HEMOGLOBIN g/dL 13.8 15.9 19.6*   HEMATOCRIT % 38.5* 45.0 53.7*   PLATELETS AUTO x10*3/uL 504* 677* 629*            Results from last 7 days   Lab Units 06/08/24  0708 06/07/24  1116 06/07/24  0045   SODIUM mmol/L 135* 135* 134*   POTASSIUM mmol/L 3.6 3.8 4.4   CHLORIDE mmol/L 100 102 95*   CO2 mmol/L 26 21 24   BUN mg/dL 24* 23 28*   CREATININE mg/dL 1.73* 2.12* 3.00*   CALCIUM mg/dL 9.5 8.6 12.0*     Results from last 7 days   Lab Units 06/06/24  1818   ALK PHOS U/L 116   BILIRUBIN TOTAL mg/dL 0.5   PROTEIN TOTAL g/dL >12.0*   ALT U/L 33   AST U/L 41*             Medications  Scheduled medications  piperacillin-tazobactam, 2.25 g, intravenous, q6h      Continuous medications     PRN medications  PRN medications: HYDROmorphone, hydrOXYzine HCL, ondansetron     Imaging  CT abdomen pelvis wo IV contrast   Final Result   1. Stable postsurgical change related to total colectomy and right   lower quadrant ileostomy as well as ileoanal pouch. No bowel    obstruction.   2. Stable postsurgical change related to right nephrectomy.   3. High-density material layering within the gallbladder lumen,   likely sludge.   4. Additional nonacute findings as above.        MACRO:   None        Signed by: Raciel Richey 6/7/2024 3:06 AM   Dictation workstation:   [id number]      XR chest 1 view   Final Result   Normal heart size with no radiographic signs of active pulmonary   parenchymal infiltration. Findings similar to the prior.   Signed by Mary Fan DO               Assessment/Plan   Principal Problem:    Acute renal failure, unspecified acute renal failure type (CMS-HCC)  Active Problems:    Hyperkalemia    Chest pain    Increased anion gap metabolic acidosis    Mr Maurice is a 33 y.o. 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 He was transferred to the floor for further management of symptoms and UTI         Updates 6/8:  -no changes in management made. Patient denies any UTI symptoms. Encourage to continue with PO intake.   -once speciation available, will transition to PO abx.    #Recent diagnosis of UTI  #Leukocytosis   -diagnosed during hospitalization 5/26-5/28  -urine culture that admission with >100k enteric bacilli, no speciation as of 6/8. Additionally, CT without evidence of pyelonephritis  -Pt reports he did not take levaquin prescribed on discharge. Denies any urinary symptoms.  -Repeat WBC after 2L IVF is 27.3. Unclear source of infection based on symptoms.  - WBC 12.1 today; was 25.4 (6/7)  PLAN:  -continue empiric zosyn for now and consider transitioning to orals once speciation  available.  -Follow up blood cultures, NGTD      FABRICIO stage III, non-oliguric (baseline Cr 1.2)-Improving   #Hyperkalemia-Resolved   -presenting Cr 4.19, s/p 3 L LR. Significant improvement as Cr 1.79  -K+ 3.6 (6/8)  PLAN:  -daily RFP. Mg  -avoid nephrotoxins   -avoid BP fluctuation, and hypotension   -renally dose meds   -encourage PO intake     #Acute nausea/vomiting- resolved   -lipase 92   -CT abd/pelvis without acute changes. No evidence of obstruction or parastomal herniation    -zofran prn     #Hypercalcemia  -initial Ca 11.5. Received 4g calcium gluconate in the ED  -Ca 9.5 today     #Bipolar disorder  -per chart review. 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         F: PRN  E: PRN  N: Regular Diet  A: pIV    DVT ppx: Low risk, encourage ambulation   GI ppx: Not indicated    CODE STATUS: Full Code   Surrogate Decision Maker: Alea (girlfriend) [phone]     Case seen and discussed with attending Dr. Ortiz, to addend as necessary.    Lanette Quick MD PGY-1

## 2024-06-08 NOTE — ED PROVIDER NOTES
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  Patient states that 2 days ago developed decreased p.o. intake, nausea, nonbloody nonbilious vomiting.  States he has had decreased/minimal output in his ostomy.  Is endorsing severe pain in his entire abdomen.  Also states that he has not urinated.  Patient denies any chest pain, shortness of breath, fevers.  Does endorse some chills.      Limitations to History: None    Additional History Obtained from: Documentation    Records Reviewed: Recent available ED and inpatient notes reviewed in EMR.    PMHx/PSHx:  Per HPI.   - has a past medical history of Hemorrhage of anus and rectum.  - has a past surgical history that includes Other surgical history (10/14/2013); Abdominal adhesion surgery (10/14/2013); Other surgical history (10/14/2013); Other surgical history (10/14/2013); Exploratory laparotomy (10/14/2013); Other surgical history (10/14/2013); IR nephrostomy tube exchange (1/8/2013); IR GI tube exchange (1/8/2013); IR nephrostomy tube exchange (3/13/2013); IR GI tube exchange (3/13/2013); IR nephrostomy tube exchange (4/12/2013); IR GI tube exchange (4/12/2013); IR nephrostomy tube exchange (6/19/2013); IR GI tube exchange (6/19/2013); IR nephrostomy tube exchange (7/24/2013); IR GI tube exchange (7/24/2013); IR nephrostomy tube exchange (11/25/2013); IR GI tube exchange (11/25/2013); IR nephrostomy tube exchange (3/6/2014); IR GI tube exchange (3/6/2014); IR nephrostomy tube exchange (5/2/2014); IR GI tube exchange (5/2/2014); IR nephrostomy tube exchange (6/9/2014); IR GI tube exchange (6/9/2014); IR nephrostomy tube exchange (7/14/2014); IR GI tube exchange (7/14/2014); IR nephrostomy tube exchange (8/18/2014); IR  GI tube exchange (8/18/2014); IR nephrostomy tube exchange (10/7/2014); IR GI tube exchange (10/7/2014); IR nephrostomy tube exchange (11/30/2014); IR GI tube exchange (11/30/2014); IR nephrostomy tube exchange (12/30/2014); IR GI tube exchange (12/30/2014); US guided percutaneous peritoneal or retroperitoneal fluid collection drainage (1/2/2015); IR nephrostomy tube exchange (4/8/2015); IR GI tube exchange (4/8/2015); IR nephrostomy tube exchange (6/5/2015); IR GI tube exchange (6/5/2015); IR nephrostomy tube exchange (8/3/2015); IR GI tube exchange (8/3/2015); IR nephrostomy tube exchange (10/1/2015); IR nephrostomy tube exchange (11/25/2015); IR GI tube exchange (11/25/2015); IR nephrostomy tube exchange (12/28/2015); IR GI tube exchange (12/28/2015); IR nephrostomy tube exchange (1/28/2016); IR nephrostomy tube exchange (1/10/2018); IR nephrostomy tube exchange (4/30/2018); IR nephrostomy tube exchange (5/22/2018); IR nephrostomy tube exchange (1/31/2017); IR nephrostomy tube exchange (3/15/2017); IR nephrostomy tube exchange (4/19/2017); IR nephrostomy tube exchange (5/18/2017); IR nephrostomy tube exchange (6/29/2017); IR nephrostomy tube exchange (8/17/2017); IR nephrostomy tube exchange (10/13/2017); CT guided percutaneous peritoneal or retroperitoneal fluid collection drainage (7/3/2018); IR nephrostomy tube exchange (3/21/2012); IR GI tube exchange (3/21/2012); IR nephrostomy tube exchange (5/24/2012); IR GI tube exchange (5/24/2012); IR nephrostomy tube exchange (8/7/2012); IR GI tube exchange (8/7/2012); IR nephrostomy tube exchange (9/5/2012); IR GI tube exchange (9/5/2012); IR nephrostomy tube exchange (11/5/2012); and IR GI tube exchange (11/5/2012).    Medications: Reviewed in EMR. See EMR for complete list of medications and doses.    Allergies:  Coconut and Hydrocodone-acetaminophen    Social History:  - Tobacco:  reports that he has never smoked. He has never used smokeless tobacco.   - Alcohol:   has no history on file for alcohol use.   - Illicit Drugs:  has no history on file for drug use.   ???????????????????????????????????????????????????????????????  Triage Vitals:  T 36.7 °C (98.1 °F)  HR 74  BP 93/69  RR 20  O2 96 % None (Room air)    PHYSICAL EXAM:   VS: As documented in the triage note and EMR flowsheet from this visit were reviewed.  Gen: uncomfortable appearing male  Eyes: PERRL, EOMI. Clear scerla.  HENT: NC/AT, Mucosal membranes dry.   Resp: Non-labored breathing on RA, CTAB, no wheezes or crackles  CV: RRR, nl S1, S2  Abd: Soft, non-distended, diffusely TTP,  stoma in place - pink with nothing in ostomy bag  Ext: no LE edema, pulses full and equal  Skin: WWP. No systemic rashes or lesions.  Neuro:  AAOx3, speech fluent, MAEx4, no focal deficit  Psych:  anxious mood and affect  ???????????????????????????????????????????????????????????????    ED Labs/Imaging:   Labs Reviewed   CBC WITH AUTO DIFFERENTIAL - Abnormal       Result Value    WBC 25.4 (*)     nRBC 0.0      RBC 6.85 (*)     Hemoglobin 19.6 (*)     Hematocrit 53.7 (*)     MCV 78 (*)     MCH 28.6      MCHC 36.5 (*)     RDW 14.6 (*)     Platelets 629 (*)     Neutrophils % 87.6      Immature Granulocytes %, Automated 0.7      Lymphocytes % 8.2      Monocytes % 3.0      Eosinophils % 0.0      Basophils % 0.5      Neutrophils Absolute 22.28 (*)     Immature Granulocytes Absolute, Automated 0.17      Lymphocytes Absolute 2.09      Monocytes Absolute 0.75      Eosinophils Absolute 0.01      Basophils Absolute 0.12 (*)    COMPREHENSIVE METABOLIC PANEL - Abnormal    Glucose 120 (*)     Sodium 129 (*)     Potassium 6.9 (*)     Chloride 98      Bicarbonate 10 (*)     Anion Gap 28 (*)     Urea Nitrogen 30 (*)     Creatinine 4.19 (*)     eGFR 18 (*)     Calcium 11.5 (*)     Albumin 6.0 (*)     Alkaline Phosphatase 116      Total Protein >12.0 (*)     AST 41 (*)     Bilirubin, Total 0.5      ALT 33     LIPASE - Abnormal    Lipase 92 (*)      Narrative:     Venipuncture immediately after or during the administration of Metamizole may lead to falsely low results. Testing should be performed immediately prior to Metamizole dosing.   URINALYSIS WITH REFLEX CULTURE AND MICROSCOPIC - Abnormal    Color, Urine Light-Orange (*)     Appearance, Urine Ex.Turbid (*)     Specific Gravity, Urine 1.024      pH, Urine 6.0      Protein, Urine 300 (3+) (*)     Glucose, Urine Normal      Blood, Urine 0.5 (2+) (*)     Ketones, Urine NEGATIVE      Bilirubin, Urine NEGATIVE      Urobilinogen, Urine Normal      Nitrite, Urine NEGATIVE      Leukocyte Esterase, Urine 500 Alice/µL (*)    BLOOD GAS VENOUS FULL PANEL - Abnormal    POCT pH, Venous 7.23 (*)     POCT pCO2, Venous 44      POCT pO2, Venous 20 (*)     POCT SO2, Venous 26 (*)     POCT Oxy Hemoglobin, Venous 26.2 (*)     POCT Hematocrit Calculated, Venous 59.0 (*)     POCT Sodium, Venous 129 (*)     POCT Potassium, Venous 7.1 (*)     POCT Chloride, Venous 100      POCT Ionized Calicum, Venous 1.23      POCT Glucose, Venous 139 (*)     POCT Lactate, Venous 2.7 (*)     POCT Base Excess, Venous -9.1 (*)     POCT HCO3 Calculated, Venous 18.4 (*)     POCT Hemoglobin, Venous 19.6 (*)     POCT Anion Gap, Venous 18.0      Patient Temperature 37.0      FiO2 21     BLOOD GAS LACTIC ACID, VENOUS - Abnormal    POCT Lactate, Venous 3.8 (*)    MAGNESIUM - Abnormal    Magnesium 2.80 (*)    MICROSCOPIC ONLY, URINE - Abnormal    WBC, Urine >50 (*)     RBC, Urine >20 (*)     Squamous Epithelial Cells, Urine 10-25 (FEW)      Mucus, Urine 4+      Hyaline Casts, Urine 4+ (*)    BLOOD GAS VENOUS FULL PANEL - Abnormal    POCT pH, Venous 7.26 (*)     POCT pCO2, Venous 51      POCT pO2, Venous 24 (*)     POCT SO2, Venous 28 (*)     POCT Oxy Hemoglobin, Venous 28.0 (*)     POCT Hematocrit Calculated, Venous 50.0      POCT Sodium, Venous 131 (*)     POCT Potassium, Venous 5.0      POCT Chloride, Venous 99      POCT Ionized Calicum, Venous 1.34 (*)      POCT Glucose, Venous 169 (*)     POCT Lactate, Venous 3.6 (*)     POCT Base Excess, Venous -4.8 (*)     POCT HCO3 Calculated, Venous 22.9      POCT Hemoglobin, Venous 16.5      POCT Anion Gap, Venous 14.0      Patient Temperature 37.0      FiO2 21     BLOOD GAS LACTIC ACID, VENOUS - Abnormal    POCT Lactate, Venous 4.0 (*)    CBC WITH AUTO DIFFERENTIAL - Abnormal    WBC 27.3 (*)     nRBC 0.0      RBC 5.76      Hemoglobin 15.9      Hematocrit 45.0      MCV 78 (*)     MCH 27.6      MCHC 35.3      RDW 12.9      Platelets 677 (*)     Neutrophils % 78.3      Immature Granulocytes %, Automated 0.5      Lymphocytes % 15.0      Monocytes % 5.9      Eosinophils % 0.0      Basophils % 0.3      Neutrophils Absolute 21.37 (*)     Immature Granulocytes Absolute, Automated 0.14      Lymphocytes Absolute 4.10      Monocytes Absolute 1.60 (*)     Eosinophils Absolute 0.01      Basophils Absolute 0.09     RENAL FUNCTION PANEL - Abnormal    Glucose 71 (*)     Sodium 134 (*)     Potassium 4.4      Chloride 95 (*)     Bicarbonate 24      Anion Gap 19      Urea Nitrogen 28 (*)     Creatinine 3.00 (*)     eGFR 27 (*)     Calcium 12.0 (*)     Phosphorus 6.7 (*)     Albumin 4.9     LACTATE - Abnormal    Lactate 2.1 (*)     Narrative:     Venipuncture immediately after or during the administration of Metamizole may lead to falsely low results. Testing should be performed immediately  prior to Metamizole dosing.   BLOOD GAS VENOUS FULL PANEL - Abnormal    POCT pH, Venous 7.33      POCT pCO2, Venous 51      POCT pO2, Venous 33 (*)     POCT SO2, Venous 44 (*)     POCT Oxy Hemoglobin, Venous 43.5 (*)     POCT Hematocrit Calculated, Venous 41.0      POCT Sodium, Venous 133 (*)     POCT Potassium, Venous 3.6      POCT Chloride, Venous 99      POCT Ionized Calicum, Venous 1.18      POCT Glucose, Venous 103 (*)     POCT Lactate, Venous 2.1 (*)     POCT Base Excess, Venous 0.1      POCT HCO3 Calculated, Venous 26.9 (*)     POCT Hemoglobin, Venous  13.8      POCT Anion Gap, Venous 11.0      Patient Temperature 37.0      FiO2 21     LACTATE - Abnormal    Lactate 2.2 (*)     Narrative:     Venipuncture immediately after or during the administration of Metamizole may lead to falsely low results. Testing should be performed immediately  prior to Metamizole dosing.   RENAL FUNCTION PANEL - Abnormal    Glucose 84      Sodium 135 (*)     Potassium 3.8      Chloride 102      Bicarbonate 21      Anion Gap 16      Urea Nitrogen 23      Creatinine 2.12 (*)     eGFR 42 (*)     Calcium 8.6      Phosphorus 4.8      Albumin 3.9     BLOOD GAS VENOUS FULL PANEL - Abnormal    POCT pH, Venous 7.36      POCT pCO2, Venous 42      POCT pO2, Venous 49 (*)     POCT SO2, Venous 76 (*)     POCT Oxy Hemoglobin, Venous 74.1      POCT Hematocrit Calculated, Venous 44.0      POCT Sodium, Venous 132 (*)     POCT Potassium, Venous 3.6      POCT Chloride, Venous 101      POCT Ionized Calicum, Venous 1.15      POCT Glucose, Venous 104 (*)     POCT Lactate, Venous 2.0      POCT Base Excess, Venous -1.8      POCT HCO3 Calculated, Venous 23.7      POCT Hemoglobin, Venous 14.6      POCT Anion Gap, Venous 11.0      Patient Temperature 37.0      FiO2 21     POCT GLUCOSE - Abnormal    POCT Glucose 246 (*)    POCT GLUCOSE - Abnormal    POCT Glucose 106 (*)    POCT GLUCOSE - Abnormal    POCT Glucose 101 (*)    BLOOD CULTURE - Normal    Blood Culture Loaded on Instrument - Culture in progress     BLOOD CULTURE - Normal    Blood Culture Loaded on Instrument - Culture in progress     SERIAL TROPONIN-INITIAL - Normal    Troponin I, High Sensitivity <3      Narrative:     Less than 99th percentile of normal range cutoff-  Female and children under 18 years old <35 ng/L; Male <54 ng/L: Negative  Repeat testing should be performed if clinically indicated.     Female and children under 18 years old  ng/L; Male  ng/L:  Consistent with possible cardiac damage and possible increased clinical   risk.  Serial measurements may help to assess extent of myocardial damage.     >120 ng/L: Consistent with cardiac damage, increased clinical risk and  myocardial infarction. Serial measurements may help assess extent of   myocardial damage.      NOTE: Children less than 1 year old may have higher baseline troponin   levels and results should be interpreted in conjunction with the overall   clinical context.    NOTE: Troponin I testing is performed using a different   testing methodology at [facility] than at other   [facility]. Direct result comparisons should only   be made within the same method.     SERIAL TROPONIN, 1 HOUR - Normal    Troponin I, High Sensitivity <3      Narrative:     Less than 99th percentile of normal range cutoff-  Female and children under 18 years old <35 ng/L; Male <54 ng/L: Negative  Repeat testing should be performed if clinically indicated.     Female and children under 18 years old  ng/L; Male  ng/L:  Consistent with possible cardiac damage and possible increased clinical   risk. Serial measurements may help to assess extent of myocardial damage.     >120 ng/L: Consistent with cardiac damage, increased clinical risk and  myocardial infarction. Serial measurements may help assess extent of   myocardial damage.      NOTE: Children less than 1 year old may have higher baseline troponin   levels and results should be interpreted in conjunction with the overall   clinical context.    NOTE: Troponin I testing is performed using a different   testing methodology at [facility] than at other   [facility]. Direct result comparisons should only   be made within the same method.     MAGNESIUM - Normal    Magnesium 2.27     URINE CULTURE   URINALYSIS WITH REFLEX CULTURE AND MICROSCOPIC    Narrative:     The following orders were created for panel order Urinalysis with Reflex Culture and Microscopic.  Procedure                               Abnormality          Status                     ---------                               -----------         ------                     Urinalysis with Reflex C...[[id number]]  Abnormal            Final result               Extra Urine Gray Tube[[id number]]                            Final result                 Please view results for these tests on the individual orders.   EXTRA URINE GRAY TUBE    Extra Tube Hold for add-ons.     TROPONIN SERIES- (INITIAL, 1 HR)    Narrative:     The following orders were created for panel order Troponin I Series, High Sensitivity (0, 1 HR).  Procedure                               Abnormality         Status                     ---------                               -----------         ------                     Troponin I, High Sensiti...[[id number]]  Normal              Final result               Troponin, High Sensitivi...[[id number]]  Normal              Final result                 Please view results for these tests on the individual orders.   BLOOD GAS VENOUS FULL PANEL   BLOOD GAS LACTIC ACID, VENOUS   CBC WITH AUTO DIFFERENTIAL   RENAL FUNCTION PANEL   MAGNESIUM   POCT GLUCOSE METER   POCT GLUCOSE METER   POCT GLUCOSE METER     CT abdomen pelvis wo IV contrast   Final Result   1. Stable postsurgical change related to total colectomy and right   lower quadrant ileostomy as well as ileoanal pouch. No bowel   obstruction.   2. Stable postsurgical change related to right nephrectomy.   3. High-density material layering within the gallbladder lumen,   likely sludge.   4. Additional nonacute findings as above.        MACRO:   None        Signed by: Raciel Richey 6/7/2024 3:06 AM   Dictation workstation:   [id number]      XR chest 1 view   Final Result   Normal heart size with no radiographic signs of active pulmonary   parenchymal infiltration. Findings similar to the prior.   Signed by Mary Fan DO          Medical Decision Making:  ED Course & MDM   Diagnoses as of 06/07/24 2028    Acute renal failure, unspecified acute renal failure type (CMS-HCC)   Hyperkalemia     This is a 33-year-old male with above-stated history presenting to the ED with abdominal pain, nausea vomiting, decreased ostomy output and urinary output.  Patient extremely uncomfortable appearing but hemodynamically stable.  Patient has diffuse tenderness to his abdomen and nothing in ostomy.  Concern for acute intra-abdominal pathology.  Patient also was nipped for labs which was notable for hyperkalemia with coinciding peaked T waves therefore patient immediately brought to a room, placed on monitoring, and given hyper-K cocktail including calcium.  Patient did have improvement in his T waves at that point.  Per chart review, patient recently admitted for possible urinary tract infection but did not  his outpatient Levaquin.  Patient's labs notable for significant FABRICIO with hyperkalemia, nephrology consulted and additional hyper-K cocktail given.  Patient was also acidotic therefore bicarb drip started in the setting of acute renal failure, likely also component of GI losses.  Given patient's single kidney, and concern for acute intra-abdominal process or infection causing patient's renal failure, likely compounded by dehydration.  Patient was discussed with ICU and will be admitted.  Patient will obtain a CT of the abdomen on the way up to the ICU.  Patient resting comfortably at time of admission and agreeable to the plan.    Social Determinants Limiting Care:  None identified    Disposition:  As a result of their workup, the patient will require admission to the hospital.  The patient was informed of their diagnosis.  Patient was given the opportunity to ask questions and answered them.  Patient agreed to be admitted to the hospital.    Patient seen and discussed with attending physician.    Angy Pollard MD PGY3  Emergency Medicine      Procedures ? Smart[employer] last updated 6/7/2024 8:28 PM          Angy TORREZ  MD [place]  Resident  06/07/24 2029

## 2024-06-08 NOTE — SIGNIFICANT EVENT
Floor Readiness Note       I, personally, evaluated Ingrid Maurice prior to transfer to the floor, including reviewing all current laboratory and imaging studies. The patient remains appropriate for transfer to the floor. Bedside nurse and respiratory therapy are also in agreement of patient's readiness for the floor.     Brief summary:  Ingrid Maurice is a 33 y.o. male who was admitted to the MICU on 6/6 for FABRICIO, metabolic acidosis, and possible urosepsis. They have been treated with Zosyn and volume resuscitated.     Updated focused Physical Exam:  Gen: Alert, well appearing, in NAD  Head/Neck: normocephalic, atraumatic, neck w/ FROM, no lymphadenopathy  Eyes: anicteric sclerae, noninjected conjunctivae  Mouth:  MMM, oropharynx without erythema or lesions  Heart: RRR, no murmurs, rubs, or gallops  Lungs: No increased work of breathing, lungs clear bilaterally, no wheezing, crackles, rhonchi  Abdomen: soft, tender to palpation in all 4 quadrants, ND,  good bowel sounds; stoma in place  Musculoskeletal: no joint swelling  Extremities: WWP, cap refill <2sec  Neurologic: Alert, symmetrical facies, phonates clearly, moves all extremities equally, responsive to touch  Psychological: appropriate mood/affect    Current Vital Signs:  Heart Rate: 85 (06/07/24 2000 : Lavern Null, GENESIS)  BP: (!) 132/102 (06/07/24 2000 : Lavern Null, RN)  Temp: 36.8 °C (98.2 °F) (06/08/24 0000 : Wilner Ramírez)  Resp: 16 (06/07/24 2000 : Lavern Null, RN)  SpO2: 100 % (06/07/24 2000 : Lavern Null, RN)    Relevant updates since rounds:  none    Accepting team, Naff, received verbal sign out and the Provider Care team/Attending has been updated. Bedside nurse will now call accepting nurse for report and patient will be transferred to [place] [id number].    Esperanza Arreola MD

## 2024-06-09 ENCOUNTER — PHARMACY VISIT (OUTPATIENT)
Dept: PHARMACY | Facility: CLINIC | Age: 34
End: 2024-06-09
Payer: MEDICAID

## 2024-06-09 VITALS
WEIGHT: 143.52 LBS | TEMPERATURE: 97.7 F | RESPIRATION RATE: 16 BRPM | SYSTOLIC BLOOD PRESSURE: 106 MMHG | DIASTOLIC BLOOD PRESSURE: 78 MMHG | BODY MASS INDEX: 21.75 KG/M2 | HEART RATE: 80 BPM | HEIGHT: 68 IN | OXYGEN SATURATION: 99 %

## 2024-06-09 LAB
ALBUMIN SERPL BCP-MCNC: 4.2 G/DL (ref 3.4–5)
ANION GAP SERPL CALC-SCNC: 14 MMOL/L (ref 10–20)
BACTERIA UR CULT: ABNORMAL
BASOPHILS # BLD AUTO: 0.08 X10*3/UL (ref 0–0.1)
BASOPHILS NFR BLD AUTO: 0.9 %
BUN SERPL-MCNC: 18 MG/DL (ref 6–23)
CALCIUM SERPL-MCNC: 9.4 MG/DL (ref 8.6–10.6)
CHLORIDE SERPL-SCNC: 101 MMOL/L (ref 98–107)
CO2 SERPL-SCNC: 25 MMOL/L (ref 21–32)
CREAT SERPL-MCNC: 1.49 MG/DL (ref 0.5–1.3)
EGFRCR SERPLBLD CKD-EPI 2021: 64 ML/MIN/1.73M*2
EOSINOPHIL # BLD AUTO: 0.11 X10*3/UL (ref 0–0.7)
EOSINOPHIL NFR BLD AUTO: 1.3 %
ERYTHROCYTE [DISTWIDTH] IN BLOOD BY AUTOMATED COUNT: 12.9 % (ref 11.5–14.5)
GLUCOSE SERPL-MCNC: 95 MG/DL (ref 74–99)
HCT VFR BLD AUTO: 39.5 % (ref 41–52)
HGB BLD-MCNC: 13.8 G/DL (ref 13.5–17.5)
IMM GRANULOCYTES # BLD AUTO: 0.02 X10*3/UL (ref 0–0.7)
IMM GRANULOCYTES NFR BLD AUTO: 0.2 % (ref 0–0.9)
LYMPHOCYTES # BLD AUTO: 3.38 X10*3/UL (ref 1.2–4.8)
LYMPHOCYTES NFR BLD AUTO: 38.8 %
MAGNESIUM SERPL-MCNC: 1.91 MG/DL (ref 1.6–2.4)
MCH RBC QN AUTO: 27.9 PG (ref 26–34)
MCHC RBC AUTO-ENTMCNC: 34.9 G/DL (ref 32–36)
MCV RBC AUTO: 80 FL (ref 80–100)
MONOCYTES # BLD AUTO: 0.79 X10*3/UL (ref 0.1–1)
MONOCYTES NFR BLD AUTO: 9.1 %
NEUTROPHILS # BLD AUTO: 4.34 X10*3/UL (ref 1.2–7.7)
NEUTROPHILS NFR BLD AUTO: 49.7 %
NRBC BLD-RTO: 0 /100 WBCS (ref 0–0)
PHOSPHATE SERPL-MCNC: 3 MG/DL (ref 2.5–4.9)
PLATELET # BLD AUTO: 485 X10*3/UL (ref 150–450)
POTASSIUM SERPL-SCNC: 3.7 MMOL/L (ref 3.5–5.3)
RBC # BLD AUTO: 4.94 X10*6/UL (ref 4.5–5.9)
SODIUM SERPL-SCNC: 136 MMOL/L (ref 136–145)
WBC # BLD AUTO: 8.7 X10*3/UL (ref 4.4–11.3)

## 2024-06-09 PROCEDURE — 83735 ASSAY OF MAGNESIUM: CPT

## 2024-06-09 PROCEDURE — 2500000004 HC RX 250 GENERAL PHARMACY W/ HCPCS (ALT 636 FOR OP/ED)

## 2024-06-09 PROCEDURE — 80069 RENAL FUNCTION PANEL: CPT

## 2024-06-09 PROCEDURE — RXMED WILLOW AMBULATORY MEDICATION CHARGE

## 2024-06-09 PROCEDURE — 99239 HOSP IP/OBS DSCHRG MGMT >30: CPT

## 2024-06-09 PROCEDURE — 36415 COLL VENOUS BLD VENIPUNCTURE: CPT

## 2024-06-09 PROCEDURE — 85025 COMPLETE CBC W/AUTO DIFF WBC: CPT

## 2024-06-09 RX ORDER — NITROFURANTOIN 25; 75 MG/1; MG/1
100 CAPSULE ORAL 2 TIMES DAILY
Qty: 8 CAPSULE | Refills: 0 | Status: SHIPPED | OUTPATIENT
Start: 2024-06-09 | End: 2024-06-14

## 2024-06-09 RX ORDER — TRAMADOL HYDROCHLORIDE 50 MG/1
50 TABLET ORAL EVERY 8 HOURS PRN
Qty: 5 TABLET | Refills: 0 | Status: CANCELLED | OUTPATIENT
Start: 2024-06-09 | End: 2024-06-12

## 2024-06-09 RX ORDER — CIPROFLOXACIN 500 MG/1
500 TABLET ORAL 2 TIMES DAILY
Qty: 6 TABLET | Refills: 0 | Status: SHIPPED | OUTPATIENT
Start: 2024-06-09 | End: 2024-06-12

## 2024-06-09 RX ORDER — DICYCLOMINE HYDROCHLORIDE 10 MG/1
10 CAPSULE ORAL 4 TIMES DAILY PRN
Qty: 30 CAPSULE | Refills: 0 | Status: SHIPPED | OUTPATIENT
Start: 2024-06-09

## 2024-06-09 RX ADMIN — HYDROMORPHONE HYDROCHLORIDE 0.5 MG: 1 INJECTION, SOLUTION INTRAMUSCULAR; INTRAVENOUS; SUBCUTANEOUS at 05:13

## 2024-06-09 RX ADMIN — PIPERACILLIN SODIUM AND TAZOBACTAM SODIUM 2.25 G: 2; .25 INJECTION, SOLUTION INTRAVENOUS at 05:14

## 2024-06-09 RX ADMIN — PIPERACILLIN SODIUM AND TAZOBACTAM SODIUM 2.25 G: 2; .25 INJECTION, SOLUTION INTRAVENOUS at 10:55

## 2024-06-09 ASSESSMENT — PAIN DESCRIPTION - LOCATION: LOCATION: ABDOMEN

## 2024-06-09 ASSESSMENT — PAIN - FUNCTIONAL ASSESSMENT
PAIN_FUNCTIONAL_ASSESSMENT: 0-10

## 2024-06-09 ASSESSMENT — PAIN SCALES - GENERAL
PAINLEVEL_OUTOF10: 6
PAINLEVEL_OUTOF10: 5 - MODERATE PAIN
PAINLEVEL_OUTOF10: 4

## 2024-06-09 NOTE — DISCHARGE INSTRUCTIONS
Dear Mr. Maurice,    You presented with nausea and vomiting and were hospitalized due to high levels of potassium, low levels of bicarb and an elevated creatinine concerning for an acute process. You were sent to the medical ICU where you were treated with broad spectrum antibiotics due to concern for a urinary tract infection. Your symptoms improved and you were transferred to the general medical floor. Your urine samples are growing a bacteria, which hasn't speciated yet, but based on your previous urine cultures, we are sending you home on an antibiotic called ciprofloxacin that you will need to take twice a day until 6/12. We are also sending you home with some bentyl to help with stomach cramping.    Thank you for letting us be a part of your care.    Sincerely,  [facility] Team  Internal Medicine

## 2024-06-09 NOTE — ED PROCEDURE NOTE
Procedure  Critical Care    Performed by: Chary Rocha MD  Authorized by: Zoë Kelsey MD    Critical care provider statement:     Critical care time (minutes):  40    Critical care was necessary to treat or prevent imminent or life-threatening deterioration of the following conditions:  Dehydration, metabolic crisis and renal failure    Critical care was time spent personally by me on the following activities:  Evaluation of patient's response to treatment, examination of patient, ordering and performing treatments and interventions, ordering and review of laboratory studies, ordering and review of radiographic studies, re-evaluation of patient's condition and review of old charts               Chary Rocha MD  06/09/24 0037

## 2024-06-09 NOTE — DISCHARGE SUMMARY
Discharge Diagnosis  Acute renal failure, unspecified acute renal failure type (CMS-HCC)    Test Results Pending At Discharge  Pending Labs       Order Current Status    BLOOD GAS VENOUS FULL PANEL In process    Blood Culture Preliminary result    Blood Culture Preliminary result    Urine Culture Preliminary result          Hospital Course  Ingrid Maurice is a 33 y.o. 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 In the ED, CXR negative for acute pulmonary process. CT AP showd Stable postsurgical change related to total colectomy and right lower quadrant ileostomy as well as ileoanal pouch. No bowel obstruction. Stable postsurgical change related to right nephrectomy.  High-density material layering within the gallbladder lumen, likely sludge. Labs notable for K 6.9, Bicarb 10, Cr 4.19, WBC 25.4. Received 3L LR in ED. Admitted to MICU for FABRICIO and metabolic acidosis. He was started on Zosyn (6/7-  while blood cultures and UA culture pend. Patient's metabolic acidosis resolved along with his nausea and vomiting. He tolerated po intake and was transferred to regular nursing floor. Pts leukocytosis cont. to improve and WBC was 8.7 on discharge. Pts FABRICIO improved and his Cr trended down from 4.19 to 1.49. His pain improved. Pts UA was growing enteric bacilli, with cultures pending. Pt previously had a UA on 3/18/2023 that was resistant to ampicillin and had intermediate susceptibilities to Nitrofurantoin, but was sensitive to other penicillins and flouroquinolones. Pt expressed a wish to leave today if possible, and give his UA hx, pt was transitioned to ciprofloxacin and was discharged with an end date of 6/12. Pt also had stomach cramping still and was willing to try bentyl outpatient.     PCP To  Do:  [ ] Repeat RFP to monitor pts Cr to ensure it comes back to baseline, and cont. to evaluate pts potassium  [ ] Check pts symptoms after he completes his course of abx (6/6 - 6/12)  [ ] Ensure pt is having adequate output in his colostomy bag    Of note, pt was discharge on ciprofloxacin but his cultures resulted in e coli that was resistant to cipro but was sensitive to macrobid. Pt was called and informed of the new macrobid BID script that was sent to [facility]. Pt verbalized his understanding and said that he would come and pick it up right away. He was also informed to not take the cipro, and that the end date would still be the same.      Pertinent Physical Exam At Time of Discharge  Physical Exam  Gen: Alert, well appearing, in NAD  Head/Neck: normocephalic, atraumatic, neck w/ FROM, no lymphadenopathy  Eyes: anicteric sclerae, noninjected conjunctivae  Mouth:  MMM, oropharynx without erythema or lesions  Heart: RRR, no murmurs, rubs, or gallops  Lungs: No increased work of breathing, lungs clear bilaterally, no wheezing, crackles, rhonchi  Abdomen: soft, tender to palpation in all 4 quadrants, ND,  good bowel sounds; stoma in place  Musculoskeletal: no joint swelling  Extremities: WWP, cap refill <2sec  Neurologic: Alert, symmetrical facies, phonates clearly, moves all extremities equally, responsive to touch  Psychological: appropriate mood/affect    Home Medications     Medication List      START taking these medications     ciprofloxacin 500 mg tablet; Commonly known as: Cipro; Take 1 tablet   (500 mg) by mouth 2 times a day for 3 days.   dicyclomine 10 mg capsule; Commonly known as: Bentyl; Take 1 capsule (10   mg) by mouth 4 times a day as needed (abdominal pain or cramps).     STOP taking these medications     levoFLOXacin 500 mg tablet; Commonly known as: Levaquin       Outpatient Follow-Up  Future Appointments   Date Time Provider Department Center   7/31/2024  3:00 PM Rita Higgins, PhD  [id number], PGY 3  Internal Medicine

## 2024-06-09 NOTE — CARE PLAN
The patient's goals for the shift include      The clinical goals for the shift include patient not having any falls or injury by end of shift.     Over the shift, the patient did make progress toward the following goals. Patient remained free from falls and injury.

## 2024-06-10 ENCOUNTER — PHARMACY VISIT (OUTPATIENT)
Dept: PHARMACY | Facility: CLINIC | Age: 34
End: 2024-06-10

## 2024-06-11 LAB
BACTERIA BLD CULT: NORMAL
BACTERIA BLD CULT: NORMAL

## 2024-07-31 ENCOUNTER — APPOINTMENT (OUTPATIENT)
Dept: LAB | Facility: LAB | Age: 34
End: 2024-07-31
Payer: COMMERCIAL

## 2024-07-31 ENCOUNTER — EVALUATION (OUTPATIENT)
Dept: OCCUPATIONAL THERAPY | Facility: HOSPITAL | Age: 34
End: 2024-07-31

## 2024-07-31 ENCOUNTER — CLINICAL SUPPORT (OUTPATIENT)
Dept: AUDIOLOGY | Facility: HOSPITAL | Age: 34
End: 2024-07-31

## 2024-07-31 DIAGNOSIS — Z00.6 RESEARCH STUDY PATIENT: Primary | ICD-10-CM

## 2024-07-31 DIAGNOSIS — Z00.6 RESEARCH STUDY PATIENT: ICD-10-CM

## 2024-07-31 DIAGNOSIS — H61.23 BILATERAL IMPACTED CERUMEN: Primary | ICD-10-CM

## 2024-07-31 LAB — VIT B12 SERPL-MCNC: 335 PG/ML (ref 211–911)

## 2024-07-31 PROCEDURE — 97165 OT EVAL LOW COMPLEX 30 MIN: CPT | Mod: GO

## 2024-07-31 PROCEDURE — 36415 COLL VENOUS BLD VENIPUNCTURE: CPT

## 2024-07-31 PROCEDURE — 82607 VITAMIN B-12: CPT

## 2024-07-31 PROCEDURE — 83921 ORGANIC ACID SINGLE QUANT: CPT

## 2024-07-31 NOTE — PROGRESS NOTES
"Reason for Visit:   Patient is participating in a research study as per specific study detail below .   Diagnosis code Z00.6  Award: [id number]  PTAEO: [id number]  IRB #: [id number]     Study short name: RECOVER.   Type of Visit:   occupational therapy.   -----------------------------------   This note is created in accordance with Standard Operating Procedures for Research Studies at [facility] which can be found on the intranet at: [email or web address]          Strength measured in pounds (remove hand and wrist jewelry, subject seated with back, pelvis, and knees as close to 90 deg as possible, participant completes two warm-up exercises shaking both hands three times and bending and stretching all fingers three times). \"For the test, I will ask you to squeeze this hand  as hard as you can while you remain seated. You will hold your hand so that it's not touching your body and squeeze the handle. I want you to sit tall and try not to lean when you squeeze. You will take a breath in, then blow out while you squeeze. You will squeeze as hard as you can until you can't squeeze any harder. Like this. (Do the squeeze demo). We will test each hand 3 times.\"    Right Test 1 - 76  Right Test 2 - 65  Right Test 3 - 65     Left Test 1 - 65  Left Test 2 - 68  Left Test 3 - 63      Method 1 set up: wooden treatment table with knee in appx 90 deg flexion, small wedge bolster placed at posterior aspect of distal thigh to minimize posterior thigh discomfort, and a gait belt is used to stabilize the thighs to the table. Participant keeps arms crossed during testing to isolate the quad muscle. A foam pad is placed across the anterior shin, in a location consistent with the isokinetic dynamometer (appx 5 cm prox to lateral malleolus) with the HHD secured against the leg of the table. A small elastic wrap is positioned " "between the table leg and calf mm to minimize belt slack. Participant performs a warm up consisting of three isometric contractions at 50% effort, 75% effort, and 100% effort with 1 minute rest between each contraction. After the warm-up, participant completes three maximal isometric contractions, holding for approximately 3 seconds, with one minute rest between each contraction. Test dominant limb first.   Method 2 set up: This setup procedure should be followed if an appropriate examination table is not available for Method 1. Participants should be seated upright with the leg vertical and the knee in approximately 90 degrees of flexion to decrease the influence of gravity during testing. Participants are not allowed to lean back in the chair while being tested. The counterforce to the knee extension force is applied by the mat through the HHD, which is perpendicular and just proximal to the malleoli.     Therapist stabilized  Left quadriceps lever arm length: 39 cm  Left Test 1 - 121.0  Left Test 2 - 88.9  Left Test 3 - 108.1    Therapist stabilized  Right quadriceps lever arm length: 39.5 cm  Right Test 1 - 121.0  Right Test 2 - 115.2  Right Test 3 - 118.7      Timed Up and Go (TUG) measured in seconds (participant sits in a standard arm chair with their back against the chair and arms resting on the chair's arms. A line is marked 10 ft away). Instruct the patient \"When I say Go, I want you to stand up from the chair, walk to the line on the floor at your normal pace, turn, walk back to the chair at your normal pace, and sit down again.\" The upper extremities should not be on the assistive device but the device should be nearby if needed. Demonstrate the test to the participant. The stopwatch starts when the participant is told to go and stops when his/her buttocks touch the seat.     Practice Trial - 11  Test 1 - 9      "

## 2024-07-31 NOTE — PROGRESS NOTES
AUDIOMETRIC EVALUATION    Name: Ingrid Maurice  : 1990  Age: 33 y.o.  Date: 24    Ingrid Maurice is here for audiogram under the [facility] research study.  Audiogram below.    Rita Higgins, PhD  Audiologist /  of Otolaryngology

## 2024-08-04 LAB — METHYLMALONATE SERPL-SCNC: <0.1 UMOL/L (ref 0–0.4)

## 2024-09-09 DIAGNOSIS — Z00.6 RESEARCH STUDY PATIENT: ICD-10-CM

## 2024-10-06 ENCOUNTER — APPOINTMENT (OUTPATIENT)
Dept: RADIOLOGY | Facility: HOSPITAL | Age: 34
End: 2024-10-06
Payer: COMMERCIAL

## 2024-10-06 ENCOUNTER — HOSPITAL ENCOUNTER (INPATIENT)
Facility: HOSPITAL | Age: 34
End: 2024-10-06
Attending: EMERGENCY MEDICINE | Admitting: INTERNAL MEDICINE
Payer: COMMERCIAL

## 2024-10-06 VITALS
WEIGHT: 143.3 LBS | BODY MASS INDEX: 21.72 KG/M2 | HEART RATE: 85 BPM | RESPIRATION RATE: 18 BRPM | SYSTOLIC BLOOD PRESSURE: 116 MMHG | TEMPERATURE: 99 F | DIASTOLIC BLOOD PRESSURE: 73 MMHG | OXYGEN SATURATION: 98 % | HEIGHT: 68 IN

## 2024-10-06 DIAGNOSIS — Z93.9 HISTORY OF CREATION OF OSTOMY (MULTI): ICD-10-CM

## 2024-10-06 DIAGNOSIS — E86.0 DEHYDRATION: ICD-10-CM

## 2024-10-06 DIAGNOSIS — R10.84 GENERALIZED ABDOMINAL PAIN: Primary | ICD-10-CM

## 2024-10-06 LAB
ALBUMIN SERPL BCP-MCNC: 5.5 G/DL (ref 3.4–5)
ALP SERPL-CCNC: 105 U/L (ref 33–120)
ALT SERPL W P-5'-P-CCNC: 16 U/L (ref 10–52)
ANION GAP BLDV CALCULATED.4IONS-SCNC: 16 MMOL/L (ref 10–25)
ANION GAP SERPL CALC-SCNC: 23 MMOL/L (ref 10–20)
APPEARANCE UR: CLEAR
AST SERPL W P-5'-P-CCNC: 16 U/L (ref 9–39)
BACTERIA #/AREA URNS AUTO: ABNORMAL /HPF
BACTERIA BLD CULT: NORMAL
BASE EXCESS BLDV CALC-SCNC: -5.9 MMOL/L (ref -2–3)
BASOPHILS # BLD AUTO: 0.07 X10*3/UL (ref 0–0.1)
BASOPHILS NFR BLD AUTO: 0.5 %
BILIRUB SERPL-MCNC: 0.9 MG/DL (ref 0–1.2)
BILIRUB UR STRIP.AUTO-MCNC: NEGATIVE MG/DL
BODY TEMPERATURE: 37 DEGREES CELSIUS
BUN SERPL-MCNC: 35 MG/DL (ref 6–23)
CA-I BLDV-SCNC: 1.16 MMOL/L (ref 1.1–1.33)
CALCIUM SERPL-MCNC: 10.7 MG/DL (ref 8.6–10.6)
CHLORIDE BLDV-SCNC: 95 MMOL/L (ref 98–107)
CHLORIDE SERPL-SCNC: 95 MMOL/L (ref 98–107)
CO2 SERPL-SCNC: 14 MMOL/L (ref 21–32)
COLOR UR: ABNORMAL
CREAT SERPL-MCNC: 2.3 MG/DL (ref 0.5–1.3)
EGFRCR SERPLBLD CKD-EPI 2021: 38 ML/MIN/1.73M*2
EOSINOPHIL # BLD AUTO: 0.02 X10*3/UL (ref 0–0.7)
EOSINOPHIL NFR BLD AUTO: 0.1 %
ERYTHROCYTE [DISTWIDTH] IN BLOOD BY AUTOMATED COUNT: 12.9 % (ref 11.5–14.5)
GLUCOSE BLDV-MCNC: 120 MG/DL (ref 74–99)
GLUCOSE SERPL-MCNC: 114 MG/DL (ref 74–99)
GLUCOSE UR STRIP.AUTO-MCNC: NORMAL MG/DL
HCO3 BLDV-SCNC: 19.6 MMOL/L (ref 22–26)
HCT VFR BLD AUTO: 56.1 % (ref 41–52)
HCT VFR BLD EST: 56 % (ref 41–52)
HGB BLD-MCNC: 19.8 G/DL (ref 13.5–17.5)
HGB BLDV-MCNC: 18.8 G/DL (ref 13.5–17.5)
HOLD SPECIMEN: NORMAL
HYALINE CASTS #/AREA URNS AUTO: ABNORMAL /LPF
IMM GRANULOCYTES # BLD AUTO: 0.04 X10*3/UL (ref 0–0.7)
IMM GRANULOCYTES NFR BLD AUTO: 0.3 % (ref 0–0.9)
INHALED O2 CONCENTRATION: 21 %
KETONES UR STRIP.AUTO-MCNC: NEGATIVE MG/DL
LACTATE BLDV-SCNC: 1.6 MMOL/L (ref 0.4–2)
LACTATE SERPL-SCNC: 1.6 MMOL/L (ref 0.4–2)
LEUKOCYTE ESTERASE UR QL STRIP.AUTO: ABNORMAL
LYMPHOCYTES # BLD AUTO: 4.33 X10*3/UL (ref 1.2–4.8)
LYMPHOCYTES NFR BLD AUTO: 31 %
MCH RBC QN AUTO: 27.8 PG (ref 26–34)
MCHC RBC AUTO-ENTMCNC: 35.3 G/DL (ref 32–36)
MCV RBC AUTO: 79 FL (ref 80–100)
MONOCYTES # BLD AUTO: 1.48 X10*3/UL (ref 0.1–1)
MONOCYTES NFR BLD AUTO: 10.6 %
MUCOUS THREADS #/AREA URNS AUTO: ABNORMAL /LPF
NEUTROPHILS # BLD AUTO: 8.03 X10*3/UL (ref 1.2–7.7)
NEUTROPHILS NFR BLD AUTO: 57.5 %
NITRITE UR QL STRIP.AUTO: NEGATIVE
NRBC BLD-RTO: 0 /100 WBCS (ref 0–0)
OXYHGB MFR BLDV: 71.2 % (ref 45–75)
PCO2 BLDV: 38 MM HG (ref 41–51)
PH BLDV: 7.32 PH (ref 7.33–7.43)
PH UR STRIP.AUTO: 6 [PH]
PLATELET # BLD AUTO: 404 X10*3/UL (ref 150–450)
PO2 BLDV: 46 MM HG (ref 35–45)
POTASSIUM BLDV-SCNC: 4.2 MMOL/L (ref 3.5–5.3)
POTASSIUM SERPL-SCNC: 4.7 MMOL/L (ref 3.5–5.3)
PROT SERPL-MCNC: 10 G/DL (ref 6.4–8.2)
PROT UR STRIP.AUTO-MCNC: ABNORMAL MG/DL
RBC # BLD AUTO: 7.12 X10*6/UL (ref 4.5–5.9)
RBC # UR STRIP.AUTO: ABNORMAL /UL
RBC #/AREA URNS AUTO: ABNORMAL /HPF
SAO2 % BLDV: 73 % (ref 45–75)
SODIUM BLDV-SCNC: 126 MMOL/L (ref 136–145)
SODIUM SERPL-SCNC: 127 MMOL/L (ref 136–145)
SP GR UR STRIP.AUTO: 1.02
UROBILINOGEN UR STRIP.AUTO-MCNC: NORMAL MG/DL
WBC # BLD AUTO: 14 X10*3/UL (ref 4.4–11.3)
WBC #/AREA URNS AUTO: >50 /HPF
WBC CLUMPS #/AREA URNS AUTO: ABNORMAL /HPF

## 2024-10-06 PROCEDURE — 96365 THER/PROPH/DIAG IV INF INIT: CPT | Mod: 59

## 2024-10-06 PROCEDURE — 71045 X-RAY EXAM CHEST 1 VIEW: CPT

## 2024-10-06 PROCEDURE — 99223 1ST HOSP IP/OBS HIGH 75: CPT | Performed by: INTERNAL MEDICINE

## 2024-10-06 PROCEDURE — 80053 COMPREHEN METABOLIC PANEL: CPT | Performed by: EMERGENCY MEDICINE

## 2024-10-06 PROCEDURE — 36415 COLL VENOUS BLD VENIPUNCTURE: CPT | Performed by: EMERGENCY MEDICINE

## 2024-10-06 PROCEDURE — 2500000004 HC RX 250 GENERAL PHARMACY W/ HCPCS (ALT 636 FOR OP/ED)

## 2024-10-06 PROCEDURE — 1210000001 HC SEMI-PRIVATE ROOM DAILY

## 2024-10-06 PROCEDURE — 87040 BLOOD CULTURE FOR BACTERIA: CPT | Performed by: EMERGENCY MEDICINE

## 2024-10-06 PROCEDURE — 2500000001 HC RX 250 WO HCPCS SELF ADMINISTERED DRUGS (ALT 637 FOR MEDICARE OP)

## 2024-10-06 PROCEDURE — G0378 HOSPITAL OBSERVATION PER HR: HCPCS

## 2024-10-06 PROCEDURE — 99285 EMERGENCY DEPT VISIT HI MDM: CPT

## 2024-10-06 PROCEDURE — 74176 CT ABD & PELVIS W/O CONTRAST: CPT | Mod: FOREIGN READ | Performed by: RADIOLOGY

## 2024-10-06 PROCEDURE — 87086 URINE CULTURE/COLONY COUNT: CPT

## 2024-10-06 PROCEDURE — 87086 URINE CULTURE/COLONY COUNT: CPT | Performed by: EMERGENCY MEDICINE

## 2024-10-06 PROCEDURE — 85025 COMPLETE CBC W/AUTO DIFF WBC: CPT | Performed by: EMERGENCY MEDICINE

## 2024-10-06 PROCEDURE — 83605 ASSAY OF LACTIC ACID: CPT | Performed by: EMERGENCY MEDICINE

## 2024-10-06 PROCEDURE — 84132 ASSAY OF SERUM POTASSIUM: CPT | Performed by: EMERGENCY MEDICINE

## 2024-10-06 PROCEDURE — 96361 HYDRATE IV INFUSION ADD-ON: CPT | Mod: 59

## 2024-10-06 PROCEDURE — 2500000004 HC RX 250 GENERAL PHARMACY W/ HCPCS (ALT 636 FOR OP/ED): Mod: SE | Performed by: EMERGENCY MEDICINE

## 2024-10-06 PROCEDURE — 81001 URINALYSIS AUTO W/SCOPE: CPT | Performed by: EMERGENCY MEDICINE

## 2024-10-06 PROCEDURE — 96375 TX/PRO/DX INJ NEW DRUG ADDON: CPT | Mod: 59

## 2024-10-06 PROCEDURE — 82810 BLOOD GASES O2 SAT ONLY: CPT | Mod: CCI | Performed by: EMERGENCY MEDICINE

## 2024-10-06 PROCEDURE — 74176 CT ABD & PELVIS W/O CONTRAST: CPT

## 2024-10-06 RX ORDER — DICYCLOMINE HYDROCHLORIDE 10 MG/1
10 CAPSULE ORAL 4 TIMES DAILY PRN
Status: DISPENSED | OUTPATIENT
Start: 2024-10-06

## 2024-10-06 RX ORDER — POLYETHYLENE GLYCOL 3350 17 G/17G
17 POWDER, FOR SOLUTION ORAL DAILY
Status: ACTIVE | OUTPATIENT
Start: 2024-10-06

## 2024-10-06 RX ORDER — ONDANSETRON HYDROCHLORIDE 2 MG/ML
4 INJECTION, SOLUTION INTRAVENOUS ONCE
Status: COMPLETED | OUTPATIENT
Start: 2024-10-06 | End: 2024-10-06

## 2024-10-06 RX ORDER — ENOXAPARIN SODIUM 100 MG/ML
40 INJECTION SUBCUTANEOUS EVERY 24 HOURS
Status: ACTIVE | OUTPATIENT
Start: 2024-10-06

## 2024-10-06 RX ORDER — SODIUM CHLORIDE, SODIUM LACTATE, POTASSIUM CHLORIDE, CALCIUM CHLORIDE 600; 310; 30; 20 MG/100ML; MG/100ML; MG/100ML; MG/100ML
100 INJECTION, SOLUTION INTRAVENOUS CONTINUOUS
Status: DISCONTINUED | OUTPATIENT
Start: 2024-10-06 | End: 2024-10-06

## 2024-10-06 RX ORDER — MORPHINE SULFATE 4 MG/ML
4 INJECTION INTRAVENOUS ONCE
Status: COMPLETED | OUTPATIENT
Start: 2024-10-06 | End: 2024-10-06

## 2024-10-06 RX ORDER — SODIUM CHLORIDE, SODIUM LACTATE, POTASSIUM CHLORIDE, CALCIUM CHLORIDE 600; 310; 30; 20 MG/100ML; MG/100ML; MG/100ML; MG/100ML
125 INJECTION, SOLUTION INTRAVENOUS CONTINUOUS
Status: ACTIVE | OUTPATIENT
Start: 2024-10-06

## 2024-10-06 RX ADMIN — MORPHINE SULFATE 4 MG: 4 INJECTION INTRAVENOUS at 04:00

## 2024-10-06 RX ADMIN — DICYCLOMINE HYDROCHLORIDE 10 MG: 10 CAPSULE ORAL at 08:07

## 2024-10-06 RX ADMIN — SODIUM CHLORIDE, POTASSIUM CHLORIDE, SODIUM LACTATE AND CALCIUM CHLORIDE 125 ML/HR: 600; 310; 30; 20 INJECTION, SOLUTION INTRAVENOUS at 09:41

## 2024-10-06 RX ADMIN — MEROPENEM 1 G: 1 INJECTION INTRAVENOUS at 13:53

## 2024-10-06 RX ADMIN — SODIUM CHLORIDE, POTASSIUM CHLORIDE, SODIUM LACTATE AND CALCIUM CHLORIDE 1000 ML: 600; 310; 30; 20 INJECTION, SOLUTION INTRAVENOUS at 03:55

## 2024-10-06 RX ADMIN — ONDANSETRON 4 MG: 2 INJECTION INTRAMUSCULAR; INTRAVENOUS at 03:54

## 2024-10-06 RX ADMIN — SODIUM CHLORIDE, POTASSIUM CHLORIDE, SODIUM LACTATE AND CALCIUM CHLORIDE 125 ML/HR: 600; 310; 30; 20 INJECTION, SOLUTION INTRAVENOUS at 17:58

## 2024-10-06 ASSESSMENT — LIFESTYLE VARIABLES
EVER HAD A DRINK FIRST THING IN THE MORNING TO STEADY YOUR NERVES TO GET RID OF A HANGOVER: NO
TOTAL SCORE: 0
HAVE YOU EVER FELT YOU SHOULD CUT DOWN ON YOUR DRINKING: NO
HAVE PEOPLE ANNOYED YOU BY CRITICIZING YOUR DRINKING: NO
EVER FELT BAD OR GUILTY ABOUT YOUR DRINKING: NO

## 2024-10-06 ASSESSMENT — ENCOUNTER SYMPTOMS
FREQUENCY: 0
ABDOMINAL PAIN: 1
VOMITING: 1
NAUSEA: 1
PALPITATIONS: 0
FEVER: 0
HEMATURIA: 0
DIZZINESS: 0
MYALGIAS: 0
FATIGUE: 1
ABDOMINAL DISTENTION: 0
DIFFICULTY URINATING: 0
LIGHT-HEADEDNESS: 0
DECREASED CONCENTRATION: 0
APPETITE CHANGE: 1
DYSURIA: 0

## 2024-10-06 ASSESSMENT — PAIN SCALES - GENERAL
PAINLEVEL_OUTOF10: 9
PAINLEVEL_OUTOF10: 5 - MODERATE PAIN

## 2024-10-06 ASSESSMENT — PAIN - FUNCTIONAL ASSESSMENT
PAIN_FUNCTIONAL_ASSESSMENT: 0-10
PAIN_FUNCTIONAL_ASSESSMENT: 0-10

## 2024-10-06 ASSESSMENT — PAIN DESCRIPTION - PROGRESSION: CLINICAL_PROGRESSION: OTHER (COMMENT)

## 2024-10-06 NOTE — PROGRESS NOTES
"Pharmacy Medication History Review    Ingrid Maurice is a 33 y.o. male admitted for Generalized abdominal pain. Pharmacy reviewed the patient's [id number] medications and allergies for accuracy.    Medications ADDED:  None  Medications CHANGED:  None  Medications REMOVED:   None  The list below reflects the updated PTA list.   Prior to Admission Medications   Prescriptions Last Dose Informant   dicyclomine (Bentyl) 10 mg capsule More than a month Self   Sig: Take 1 capsule (10 mg) by mouth 4 times a day as needed (abdominal pain or cramps).   Patient not taking: Reported on 10/6/2024Regimen complete       Facility-Administered Medications: None        The list below reflects the updated allergy list. Please review each documented allergy for additional clarification and justification.  Allergies  Reviewed by Chico Willard on 10/6/2024        Severity Reactions Comments    Coconut High Anaphylaxis, Swelling Throat Swelling    Banana Not Specified Unknown     Hydrocodone-acetaminophen Not Specified Other, Hives tolerated 09/12 without reaction            Patient accepts M2B at discharge.     Sources:   Good Historian patient interview   Dispense History   No OARRS Hx    Additional Comments:  None      CHICO WILLARD  Pharmacy Technician  10/06/24     Secure Chat preferred   If no response call [phone] or [facility] \"Med Rec\"    "

## 2024-10-06 NOTE — ED PROVIDER NOTES
"History of Present Illness     History provided by: Patient  Limitations to History: None    HPI:  Ingrid Maurice is a 33 y.o. male  presenting with decreased ostomy output, poor p.o. intake, nausea with dry heaving with associated left-sided flank pain for the past 3 days.   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    Patient states that 6 days ago his girlfriend at bedside was sick and he developed all of his presenting symptoms shortly after.  Patient is also endorsing decreased urine output, which she says is typical whenever he presents to the hospital with abdominal pain.  Patient denies pain with urination.  Patient states that he has been dry heaving without vomitus and whenever he heaves it is associated with left-sided flank pain.  Patient denies chest pain, shortness of breath, fevers.  Patient states that at baseline he feels hot, but is endorsing some chills for the past couple days.  Patient also states that the output from his ostomy has changed and smell and smells \"rotten\" in comparison to ostomy output smelling like what he previously ate. Patient states he was previously prescribed Zofran for nausea/vomiting, but is currently not taking as it is not working since his last admission.    Patient was previously admitted on 6/6/2024 for similar symptoms requiring MICU admission for poor p.o. intake, nausea/vomiting of 1 day.  During his admission, patient was started on Zosyn [c/b acute kidney injury] and discharged on ciprofloxacin and dicyclomine.      Physical Exam   Triage vitals:  T 37 °C (98.6 °F)  HR (!) 106  BP (!) 128/95  RR 18  O2 96 %      Physical Exam  Vitals and nursing note reviewed.   Constitutional:       General: He is not in acute distress.     Appearance: Normal appearance. "   HENT:      Head: Normocephalic and atraumatic.   Eyes:      Extraocular Movements: Extraocular movements intact.      Conjunctiva/sclera: Conjunctivae normal.      Pupils: Pupils are equal, round, and reactive to light.   Cardiovascular:      Rate and Rhythm: Regular rhythm. Tachycardia present.   Abdominal:      General: Abdomen is flat. Bowel sounds are normal. There is no distension.      Tenderness: There is no abdominal tenderness. There is no guarding or rebound.      Comments: Abdomen soft, nondistended, stoma in place, right sided CVA scar.    Musculoskeletal:         General: Normal range of motion.   Skin:     General: Skin is warm.   Neurological:      General: No focal deficit present.      Mental Status: He is alert. Mental status is at baseline.        Medical Decision Making & ED Course   Medical Decision Making/ED Course:  Patient is a 33-year-old male presenting with chief complaints of abdominal pain, decreased ostomy output, poor p.o. intake, nausea with dry heaving and associated left sided flank pain for the past 3 days.  Of note, patient has right-sided nephrectomy, previously requiring multiple nephrostomy tubes.  Patient has ostomy bag, abdomen is soft nondistended, stoma still in place.  There is a right sided CVA scar at the site of the right nephrectomy.  Vitals assessed and reviewed, nursing note reviewed, patient is tachycardic but otherwise vitally stable on presentation.  Initial blood work concerning for non-anion gap metabolic acidosis, leukocytosis, multiple metabolic derangements.  Working differential diagnosis at this time includes: Dehydration, partial small bowel obstruction, sepsis.  -CT abdomen/pelvis obtained, no evidence of acute inflammatory process in abdomen or pelvis.  In the setting of patient's acute kidney injury contrast was withheld, low suspicion for SBO per read.  - For patient's dehydration, patient will receive LR bolus x 2, patient states this is in line  with his typical regimen when he presents for abdominal pain.  Initial hydration also given will also improve patient's acute kidney injury.  - For patient's nausea, patient received Zofran 4 Mg IV.  - For patient's pain, morphine 4 Mg IV given, well-tolerated by patient.  - Blood cultures, urinalysis with cultures pending, history of infectious etiology to abdominal pain requiring antibiotic course, we will be admitting patient for multiple metabolic derangements and potential need for antibiotic.    Social Determinants of Health which Significantly Impact Care: None identified     EKG Independent Interpretation: EKG not obtained    Independent Result Review and Interpretation: Relevant laboratory and radiographic results were reviewed and independently interpreted by myself.  As necessary, they are commented on in the ED Course.    Care Considerations: As documented above in MDM      Disposition   Admit to medicine.     Procedures     Patient seen and discussed with ED attending physician.    Davian Jorge MD  Emergency Medicine     Davian Jorge MD  10/06/24 0615       Davian Jorge MD  10/06/24 0618

## 2024-10-06 NOTE — H&P
History Of Present Illness  Ingrid Maurice is a 33 y.o. male presenting with left sided flank pain , decreased ostomy output nausea , light emesis for 3 days.  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 Pt is s/p ileoanal J pouch , proctocolectomy and end ileostomy  (2011)  , nephrectomy (2018) secondary to ureteral stricture and compression , extensive abdominal surgical history. Recurrent history of UTIs (most recent June 2024 for which ESBL E. Coli was cultured , treated with zosyn IV inpatient and then ciprofloxacin PO on discharge) and decreased ostomy output during times of illness due to dehydration.    Pt stated that this most recent episode started 3 days ago when he started to notice increased wet output in his ostomy bag which was followed by no output. The pt was also experiencing  nausea , light clear emesis , and weakness. The pt admits to his girlfriend being a sick contact 6 days ago with upper respiratory symptoms. He says this is a very typically course for him where during times of illness he will decrease his PO intake and feel nausea , vomiting , weakness with decreased ostomy output which will prompt him to come to the ED. He takes bentyl at home because it helps with his nausea and generalized abdominal pain.  The pt reports his a never smoker , no tobacco use , no alcohol use , occasional marijuana use.    ED COURSE:  On presentation to the ED, the pt had relatively stable vitals: Temp 98.6 ,  , /95 , SpO2 96%.  Initial lab values revealed Glucose 114 , Sodium 127 , Potassium 4.7 , Chloride 95 , Bicarb 14 , BUN 35 , Cr 2.30 , LFTs WNL , Albumin 5.5 , Total Protein 10. WBC 14.0 , HgB 19.8 , Platelets 404.  These were suggestive of dehydration.    CT Ab/Pelv revealed NO acute pathologies or concerns.  CXR revealed NO acute pathologies or concerns.  Blood Cultures and Urine Culture were collected.    Pt was given 1 L LR bolus. Pt self  reports feeling much better after bolus completion.  Morphine and Zofran were given to control pain and nausea.     Allergies  Coconut, Banana, and Hydrocodone-acetaminophen    Review of Systems   Constitutional:  Positive for appetite change and fatigue. Negative for fever.   HENT:  Negative for congestion.    Cardiovascular:  Negative for chest pain and palpitations.   Gastrointestinal:  Positive for abdominal pain, nausea and vomiting. Negative for abdominal distention.        Decreased output into ileostomy bag.  Patient first noticed increased wet output which then turned into NO output.   Endocrine: Negative for polyuria.   Genitourinary:  Negative for difficulty urinating, dysuria, frequency, hematuria and urgency.   Musculoskeletal:  Negative for myalgias.   Skin:  Negative for rash.   Neurological:  Negative for dizziness and light-headedness.   Psychiatric/Behavioral:  Negative for decreased concentration.        Physical Exam  Constitutional:       Appearance: Normal appearance.   HENT:      Mouth/Throat:      Mouth: Mucous membranes are dry.   Eyes:      General: No scleral icterus.     Extraocular Movements: Extraocular movements intact.      Conjunctiva/sclera: Conjunctivae normal.   Cardiovascular:      Rate and Rhythm: Normal rate and regular rhythm.      Pulses: Normal pulses.      Heart sounds: Normal heart sounds.   Pulmonary:      Effort: Pulmonary effort is normal. No respiratory distress.      Breath sounds: Normal breath sounds.   Abdominal:      General: Abdomen is flat. There is no distension.      Tenderness: There is abdominal tenderness. There is left CVA tenderness. There is no guarding.      Comments: No output into ileostomy bag.   Musculoskeletal:         General: No tenderness.      Cervical back: Neck supple.   Skin:     Findings: No rash.   Neurological:      Mental Status: He is alert and oriented to person, place, and time.      Motor: No weakness.   Psychiatric:         Mood and  Affect: Mood normal.         Judgment: Judgment normal.     Last Recorded Vitals  /88   Pulse 90   Temp 37 °C (98.6 °F)   Resp 18   Wt 65 kg (143 lb 4.8 oz)   SpO2 95%       Scheduled medications  enoxaparin, 40 mg, subcutaneous, q24h  polyethylene glycol, 17 g, oral, Daily      Continuous medications  lactated Ringer's, 125 mL/hr, Last Rate: 125 mL/hr (10/06/24 0941)      PRN medications  PRN medications: dicyclomine      Results for orders placed or performed during the hospital encounter of 10/06/24 (from the past 24 hour(s))   Blood Culture    Specimen: Peripheral Venipuncture; Blood culture   Result Value Ref Range    Blood Culture Loaded on Instrument - Culture in progress    Comprehensive metabolic panel   Result Value Ref Range    Glucose 114 (H) 74 - 99 mg/dL    Sodium 127 (L) 136 - 145 mmol/L    Potassium 4.7 3.5 - 5.3 mmol/L    Chloride 95 (L) 98 - 107 mmol/L    Bicarbonate 14 (L) 21 - 32 mmol/L    Anion Gap 23 (H) 10 - 20 mmol/L    Urea Nitrogen 35 (H) 6 - 23 mg/dL    Creatinine 2.30 (H) 0.50 - 1.30 mg/dL    eGFR 38 (L) >60 mL/min/1.73m*2    Calcium 10.7 (H) 8.6 - 10.6 mg/dL    Albumin 5.5 (H) 3.4 - 5.0 g/dL    Alkaline Phosphatase 105 33 - 120 U/L    Total Protein 10.0 (H) 6.4 - 8.2 g/dL    AST 16 9 - 39 U/L    Bilirubin, Total 0.9 0.0 - 1.2 mg/dL    ALT 16 10 - 52 U/L   Lactate   Result Value Ref Range    Lactate 1.6 0.4 - 2.0 mmol/L   CBC and Auto Differential   Result Value Ref Range    WBC 14.0 (H) 4.4 - 11.3 x10*3/uL    nRBC 0.0 0.0 - 0.0 /100 WBCs    RBC 7.12 (H) 4.50 - 5.90 x10*6/uL    Hemoglobin 19.8 (H) 13.5 - 17.5 g/dL    Hematocrit 56.1 (H) 41.0 - 52.0 %    MCV 79 (L) 80 - 100 fL    MCH 27.8 26.0 - 34.0 pg    MCHC 35.3 32.0 - 36.0 g/dL    RDW 12.9 11.5 - 14.5 %    Platelets 404 150 - 450 x10*3/uL    Neutrophils % 57.5 40.0 - 80.0 %    Immature Granulocytes %, Automated 0.3 0.0 - 0.9 %    Lymphocytes % 31.0 13.0 - 44.0 %    Monocytes % 10.6 2.0 - 10.0 %    Eosinophils % 0.1 0.0 -  6.0 %    Basophils % 0.5 0.0 - 2.0 %    Neutrophils Absolute 8.03 (H) 1.20 - 7.70 x10*3/uL    Immature Granulocytes Absolute, Automated 0.04 0.00 - 0.70 x10*3/uL    Lymphocytes Absolute 4.33 1.20 - 4.80 x10*3/uL    Monocytes Absolute 1.48 (H) 0.10 - 1.00 x10*3/uL    Eosinophils Absolute 0.02 0.00 - 0.70 x10*3/uL    Basophils Absolute 0.07 0.00 - 0.10 x10*3/uL   BLOOD GAS VENOUS FULL PANEL   Result Value Ref Range    POCT pH, Venous 7.32 (L) 7.33 - 7.43 pH    POCT pCO2, Venous 38 (L) 41 - 51 mm Hg    POCT pO2, Venous 46 (H) 35 - 45 mm Hg    POCT SO2, Venous 73 45 - 75 %    POCT Oxy Hemoglobin, Venous 71.2 45.0 - 75.0 %    POCT Hematocrit Calculated, Venous 56.0 (H) 41.0 - 52.0 %    POCT Sodium, Venous 126 (L) 136 - 145 mmol/L    POCT Potassium, Venous 4.2 3.5 - 5.3 mmol/L    POCT Chloride, Venous 95 (L) 98 - 107 mmol/L    POCT Ionized Calicum, Venous 1.16 1.10 - 1.33 mmol/L    POCT Glucose, Venous 120 (H) 74 - 99 mg/dL    POCT Lactate, Venous 1.6 0.4 - 2.0 mmol/L    POCT Base Excess, Venous -5.9 (L) -2.0 - 3.0 mmol/L    POCT HCO3 Calculated, Venous 19.6 (L) 22.0 - 26.0 mmol/L    POCT Hemoglobin, Venous 18.8 (H) 13.5 - 17.5 g/dL    POCT Anion Gap, Venous 16.0 10.0 - 25.0 mmol/L    Patient Temperature 37.0 degrees Celsius    FiO2 21 %   Urinalysis with Reflex Culture and Microscopic   Result Value Ref Range    Color, Urine Light-Yellow Light-Yellow, Yellow, Dark-Yellow    Appearance, Urine Clear Clear    Specific Gravity, Urine 1.025 1.005 - 1.035    pH, Urine 6.0 5.0, 5.5, 6.0, 6.5, 7.0, 7.5, 8.0    Protein, Urine 50 (1+) (A) NEGATIVE, 10 (TRACE), 20 (TRACE) mg/dL    Glucose, Urine Normal Normal mg/dL    Blood, Urine 0.2 (2+) (A) NEGATIVE    Ketones, Urine NEGATIVE NEGATIVE mg/dL    Bilirubin, Urine NEGATIVE NEGATIVE    Urobilinogen, Urine Normal Normal mg/dL    Nitrite, Urine NEGATIVE NEGATIVE    Leukocyte Esterase, Urine 500 Alice/µL (A) NEGATIVE   Microscopic Only, Urine   Result Value Ref Range    WBC, Urine >50 (A)  1-5, NONE /HPF    WBC Clumps, Urine OCCASIONAL Reference range not established. /HPF    RBC, Urine 11-20 (A) NONE, 1-2, 3-5 /HPF    Bacteria, Urine 1+ (A) NONE SEEN /HPF    Mucus, Urine FEW Reference range not established. /LPF    Hyaline Casts, Urine 3+ (A) NONE /LPF         Assessment/Plan   Assessment & Plan  Generalized abdominal pain    Dehydration    History of creation of ostomy (Multi)    Ingrid Maurice is a 33 y.o. male presenting with left sided flank pain , decreased ostomy output nausea , light emesis for 3 days. 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 Pt is s/p ileoanal J pouch , proctocolectomy and end ileostomy  (2011)  , nephrectomy (2018) secondary to ureteral stricture and compression , extensive abdominal surgical history. Recurrent history of UTIs (most recent June 2024 for which ESBL E. Coli was cultured , treated with zosyn IV inpatient and then ciprofloxacin PO on discharge) and decreased ostomy output during times of illness due to dehydration.    Pt stated that this most recent episode started 3 days ago when he started to notice increased wet output in his ostomy bag which was followed by no output. The pt was also experiencing  nausea , light clear emesis , and weakness. The pt admits to his girlfriend being a sick contact 6 days ago with upper respiratory symptoms. He says this is a very typical course for him where during times of illness he will decrease his PO intake and feel nausea , vomiting , weakness with decreased ostomy output which will prompt him to come to the ED.  CT Ab/Pelv identified NO acute pathologies.  Lab values are concerning for dehydration. Potential for likely self limiting viral gastroenteritis with WBC 14.0 and correlated clinical history.    # Decreased Ostomy Output  # Dehydration    - 2 L of LR bolus ordered , pt reported improvement in symptoms after first liter    # Leukocytosis  # Flank Pain & Nausea  ;; CT  ab/pelv not concerning for any acute pathologies  ;; CXR not concerning for any acute pathologies    - blood cultures pending , UA with urine cultures pending - strong considerations for UTI given history (most recent on 6/6/2024 - grew ESBL e.coli)  - will monitor WBC for down trend along with symptoms , will consider antibiotics if WBC and symptoms do not continue to improve , as well as urine culture findings  - pain regimen: morphine 4mg IV one dose  - zofran 4mg IV one dose    F: PRN  E: k>4 , mg>2  N: normal adult  A: PIV  GI prophylaxis: none  Bowel: miralax  Oxygen: room air     DVT prophylaxis: enoxaparin 40mg subc daily     Code status: full code         EILEEN PAREDES

## 2024-10-07 VITALS
TEMPERATURE: 98.1 F | BODY MASS INDEX: 21.72 KG/M2 | HEIGHT: 68 IN | WEIGHT: 143.3 LBS | SYSTOLIC BLOOD PRESSURE: 119 MMHG | DIASTOLIC BLOOD PRESSURE: 73 MMHG | OXYGEN SATURATION: 99 % | RESPIRATION RATE: 17 BRPM | HEART RATE: 73 BPM

## 2024-10-07 LAB
ALBUMIN SERPL BCP-MCNC: 3.7 G/DL (ref 3.4–5)
ANION GAP SERPL CALC-SCNC: 10 MMOL/L (ref 10–20)
BACTERIA UR CULT: NORMAL
BUN SERPL-MCNC: 18 MG/DL (ref 6–23)
CALCIUM SERPL-MCNC: 8.8 MG/DL (ref 8.6–10.6)
CHLORIDE SERPL-SCNC: 103 MMOL/L (ref 98–107)
CO2 SERPL-SCNC: 23 MMOL/L (ref 21–32)
CREAT SERPL-MCNC: 1.36 MG/DL (ref 0.5–1.3)
EGFRCR SERPLBLD CKD-EPI 2021: 71 ML/MIN/1.73M*2
ERYTHROCYTE [DISTWIDTH] IN BLOOD BY AUTOMATED COUNT: 12.5 % (ref 11.5–14.5)
GLUCOSE SERPL-MCNC: 99 MG/DL (ref 74–99)
HCT VFR BLD AUTO: 37.4 % (ref 41–52)
HGB BLD-MCNC: 13.9 G/DL (ref 13.5–17.5)
MAGNESIUM SERPL-MCNC: 2.16 MG/DL (ref 1.6–2.4)
MCH RBC QN AUTO: 28.5 PG (ref 26–34)
MCHC RBC AUTO-ENTMCNC: 37.2 G/DL (ref 32–36)
MCV RBC AUTO: 77 FL (ref 80–100)
NRBC BLD-RTO: 0 /100 WBCS (ref 0–0)
PHOSPHATE SERPL-MCNC: 2.9 MG/DL (ref 2.5–4.9)
PLATELET # BLD AUTO: 237 X10*3/UL (ref 150–450)
POTASSIUM SERPL-SCNC: 3.3 MMOL/L (ref 3.5–5.3)
RBC # BLD AUTO: 4.88 X10*6/UL (ref 4.5–5.9)
SODIUM SERPL-SCNC: 133 MMOL/L (ref 136–145)
WBC # BLD AUTO: 9.2 X10*3/UL (ref 4.4–11.3)

## 2024-10-07 PROCEDURE — 83735 ASSAY OF MAGNESIUM: CPT

## 2024-10-07 PROCEDURE — 2500000001 HC RX 250 WO HCPCS SELF ADMINISTERED DRUGS (ALT 637 FOR MEDICARE OP)

## 2024-10-07 PROCEDURE — 85027 COMPLETE CBC AUTOMATED: CPT

## 2024-10-07 PROCEDURE — 80069 RENAL FUNCTION PANEL: CPT

## 2024-10-07 PROCEDURE — 96366 THER/PROPH/DIAG IV INF ADDON: CPT | Mod: 59

## 2024-10-07 PROCEDURE — 36415 COLL VENOUS BLD VENIPUNCTURE: CPT

## 2024-10-07 PROCEDURE — G0378 HOSPITAL OBSERVATION PER HR: HCPCS

## 2024-10-07 PROCEDURE — 2500000004 HC RX 250 GENERAL PHARMACY W/ HCPCS (ALT 636 FOR OP/ED)

## 2024-10-07 PROCEDURE — 96361 HYDRATE IV INFUSION ADD-ON: CPT | Mod: 59

## 2024-10-07 RX ORDER — POTASSIUM CHLORIDE 1.5 G/1.58G
POWDER, FOR SOLUTION ORAL
Status: COMPLETED
Start: 2024-10-07 | End: 2024-10-07

## 2024-10-07 RX ORDER — POTASSIUM CHLORIDE 1.5 G/1.58G
40 POWDER, FOR SOLUTION ORAL ONCE
Status: COMPLETED | OUTPATIENT
Start: 2024-10-07 | End: 2024-10-07

## 2024-10-07 RX ADMIN — MEROPENEM 1 G: 1 INJECTION INTRAVENOUS at 01:48

## 2024-10-07 RX ADMIN — SODIUM CHLORIDE, POTASSIUM CHLORIDE, SODIUM LACTATE AND CALCIUM CHLORIDE 125 ML/HR: 600; 310; 30; 20 INJECTION, SOLUTION INTRAVENOUS at 02:20

## 2024-10-07 RX ADMIN — POTASSIUM CHLORIDE 40 MEQ: 1.5 POWDER, FOR SOLUTION ORAL at 10:30

## 2024-10-07 NOTE — ED NOTES
1245: pt noted to not be in his ED bed.    1250: pt still not returned,  all his belongings are gone as well,  the pt might have eloped.    1330: inpatient team made aware the the patient was suspcted to have eloped,    1345: inpatient team contacted back, made them aware that the patient was no longer in the department and was likely have eloped.       Jhon Soto RN  10/07/24 9621

## 2024-10-07 NOTE — SIGNIFICANT EVENT
Primary was informed by his nurse (Tiburcio Soto RN) around 1pm today that the patient left the hospital AMA. They looked all over the ED and patient can't be found. Primary team called his number on file but no answer.

## 2024-10-08 LAB — BACTERIA UR CULT: ABNORMAL

## 2024-10-09 ENCOUNTER — PATIENT OUTREACH (OUTPATIENT)
Dept: CARE COORDINATION | Facility: CLINIC | Age: 34
End: 2024-10-09
Payer: COMMERCIAL

## 2024-10-09 NOTE — PROGRESS NOTES
Outreach call to patient to support a smooth transition of care from recent admission.  Unable to leave a voicemail message for patient with my contact information.    Dolores Flores RN  [phone]

## 2024-10-10 LAB — BACTERIA BLD CULT: NORMAL

## 2024-10-17 ENCOUNTER — APPOINTMENT (OUTPATIENT)
Dept: RESPIRATORY THERAPY | Facility: HOSPITAL | Age: 34
End: 2024-10-17
Payer: COMMERCIAL

## 2024-10-30 ENCOUNTER — APPOINTMENT (OUTPATIENT)
Dept: PRIMARY CARE | Facility: CLINIC | Age: 34
End: 2024-10-30
Payer: COMMERCIAL

## 2025-02-05 ENCOUNTER — APPOINTMENT (OUTPATIENT)
Dept: LAB | Facility: LAB | Age: 35
End: 2025-02-05
Payer: COMMERCIAL

## 2025-02-05 DIAGNOSIS — Z00.6 RESEARCH STUDY PATIENT: Primary | ICD-10-CM

## 2025-02-06 ENCOUNTER — LAB (OUTPATIENT)
Dept: LAB | Facility: LAB | Age: 35
End: 2025-02-06
Payer: COMMERCIAL

## 2025-02-06 DIAGNOSIS — Z00.6 RESEARCH STUDY PATIENT: ICD-10-CM

## 2025-02-06 LAB
25(OH)D3 SERPL-MCNC: 10 NG/ML (ref 30–100)
ALBUMIN SERPL BCP-MCNC: 4.1 G/DL (ref 3.4–5)
ALP SERPL-CCNC: 67 U/L (ref 33–120)
ALT SERPL W P-5'-P-CCNC: 9 U/L (ref 10–52)
ANION GAP SERPL CALC-SCNC: 11 MMOL/L (ref 10–20)
APPEARANCE UR: CLEAR
AST SERPL W P-5'-P-CCNC: 12 U/L (ref 9–39)
BACTERIA #/AREA URNS AUTO: ABNORMAL /HPF
BASOPHILS # BLD AUTO: 0.06 X10*3/UL (ref 0–0.1)
BASOPHILS NFR BLD AUTO: 0.8 %
BILIRUB SERPL-MCNC: 0.3 MG/DL (ref 0–1.2)
BILIRUB UR STRIP.AUTO-MCNC: NEGATIVE MG/DL
BUN SERPL-MCNC: 7 MG/DL (ref 6–23)
CALCIUM SERPL-MCNC: 9.4 MG/DL (ref 8.6–10.6)
CAOX CRY #/AREA UR COMP ASSIST: ABNORMAL /HPF
CHLORIDE SERPL-SCNC: 106 MMOL/L (ref 98–107)
CO2 SERPL-SCNC: 25 MMOL/L (ref 21–32)
COLOR UR: YELLOW
CREAT SERPL-MCNC: 1.31 MG/DL (ref 0.5–1.3)
EGFRCR SERPLBLD CKD-EPI 2021: 74 ML/MIN/1.73M*2
EOSINOPHIL # BLD AUTO: 0.09 X10*3/UL (ref 0–0.7)
EOSINOPHIL NFR BLD AUTO: 1.2 %
ERYTHROCYTE [DISTWIDTH] IN BLOOD BY AUTOMATED COUNT: 13.3 % (ref 11.5–14.5)
GLUCOSE SERPL-MCNC: 77 MG/DL (ref 74–99)
GLUCOSE UR STRIP.AUTO-MCNC: NORMAL MG/DL
HCT VFR BLD AUTO: 36.5 % (ref 41–52)
HGB BLD-MCNC: 12.9 G/DL (ref 13.5–17.5)
HYALINE CASTS #/AREA URNS AUTO: ABNORMAL /LPF
IMM GRANULOCYTES # BLD AUTO: 0.01 X10*3/UL (ref 0–0.7)
IMM GRANULOCYTES NFR BLD AUTO: 0.1 % (ref 0–0.9)
KETONES UR STRIP.AUTO-MCNC: NEGATIVE MG/DL
LEUKOCYTE ESTERASE UR QL STRIP.AUTO: ABNORMAL
LYMPHOCYTES # BLD AUTO: 3.23 X10*3/UL (ref 1.2–4.8)
LYMPHOCYTES NFR BLD AUTO: 42.1 %
MCH RBC QN AUTO: 28.9 PG (ref 26–34)
MCHC RBC AUTO-ENTMCNC: 35.3 G/DL (ref 32–36)
MCV RBC AUTO: 82 FL (ref 80–100)
MONOCYTES # BLD AUTO: 0.65 X10*3/UL (ref 0.1–1)
MONOCYTES NFR BLD AUTO: 8.5 %
MUCOUS THREADS #/AREA URNS AUTO: ABNORMAL /LPF
NEUTROPHILS # BLD AUTO: 3.64 X10*3/UL (ref 1.2–7.7)
NEUTROPHILS NFR BLD AUTO: 47.3 %
NITRITE UR QL STRIP.AUTO: NEGATIVE
NRBC BLD-RTO: 0 /100 WBCS (ref 0–0)
PH UR STRIP.AUTO: 5 [PH]
PLATELET # BLD AUTO: 300 X10*3/UL (ref 150–450)
POTASSIUM SERPL-SCNC: 4.1 MMOL/L (ref 3.5–5.3)
PROT SERPL-MCNC: 6.6 G/DL (ref 6.4–8.2)
PROT UR STRIP.AUTO-MCNC: ABNORMAL MG/DL
RBC # BLD AUTO: 4.47 X10*6/UL (ref 4.5–5.9)
RBC # UR STRIP.AUTO: ABNORMAL MG/DL
RBC #/AREA URNS AUTO: ABNORMAL /HPF
SODIUM SERPL-SCNC: 138 MMOL/L (ref 136–145)
SP GR UR STRIP.AUTO: 1.03
UROBILINOGEN UR STRIP.AUTO-MCNC: NORMAL MG/DL
WBC # BLD AUTO: 7.7 X10*3/UL (ref 4.4–11.3)
WBC #/AREA URNS AUTO: ABNORMAL /HPF

## 2025-02-06 PROCEDURE — 82306 VITAMIN D 25 HYDROXY: CPT | Performed by: INTERNAL MEDICINE

## 2025-02-06 PROCEDURE — 80053 COMPREHEN METABOLIC PANEL: CPT | Performed by: INTERNAL MEDICINE

## 2025-02-06 PROCEDURE — 85025 COMPLETE CBC W/AUTO DIFF WBC: CPT | Performed by: INTERNAL MEDICINE

## 2025-02-06 PROCEDURE — 81001 URINALYSIS AUTO W/SCOPE: CPT | Performed by: INTERNAL MEDICINE

## 2025-02-12 ENCOUNTER — APPOINTMENT (OUTPATIENT)
Dept: LAB | Facility: LAB | Age: 35
End: 2025-02-12
Payer: COMMERCIAL

## 2025-02-19 ENCOUNTER — APPOINTMENT (OUTPATIENT)
Dept: RADIOLOGY | Facility: HOSPITAL | Age: 35
End: 2025-02-19
Payer: COMMERCIAL

## 2025-02-19 ENCOUNTER — CLINICAL SUPPORT (OUTPATIENT)
Dept: EMERGENCY MEDICINE | Facility: HOSPITAL | Age: 35
End: 2025-02-19
Payer: COMMERCIAL

## 2025-02-19 ENCOUNTER — HOSPITAL ENCOUNTER (INPATIENT)
Facility: HOSPITAL | Age: 35
LOS: 1 days | Discharge: HOME | End: 2025-02-20
Attending: EMERGENCY MEDICINE | Admitting: STUDENT IN AN ORGANIZED HEALTH CARE EDUCATION/TRAINING PROGRAM
Payer: COMMERCIAL

## 2025-02-19 DIAGNOSIS — R11.2 NAUSEA AND VOMITING, UNSPECIFIED VOMITING TYPE: ICD-10-CM

## 2025-02-19 DIAGNOSIS — R52 PAIN: ICD-10-CM

## 2025-02-19 DIAGNOSIS — E87.1 HYPONATREMIA: ICD-10-CM

## 2025-02-19 DIAGNOSIS — N39.0 URINARY TRACT INFECTION WITHOUT HEMATURIA, SITE UNSPECIFIED: ICD-10-CM

## 2025-02-19 DIAGNOSIS — A41.9 SEPSIS, DUE TO UNSPECIFIED ORGANISM, UNSPECIFIED WHETHER ACUTE ORGAN DYSFUNCTION PRESENT (MULTI): Primary | ICD-10-CM

## 2025-02-19 DIAGNOSIS — N17.9 AKI (ACUTE KIDNEY INJURY) (CMS-HCC): ICD-10-CM

## 2025-02-19 DIAGNOSIS — R10.84 GENERALIZED ABDOMINAL PAIN: ICD-10-CM

## 2025-02-19 LAB
ALBUMIN SERPL BCP-MCNC: 5.2 G/DL (ref 3.4–5)
ALP SERPL-CCNC: 95 U/L (ref 33–120)
ALT SERPL W P-5'-P-CCNC: 16 U/L (ref 10–52)
ANION GAP BLDV CALCULATED.4IONS-SCNC: 16 MMOL/L (ref 10–25)
ANION GAP SERPL CALC-SCNC: 17 MMOL/L (ref 10–20)
APPEARANCE UR: ABNORMAL
APTT PPP: 25 SECONDS (ref 27–38)
AST SERPL W P-5'-P-CCNC: 16 U/L (ref 9–39)
ATRIAL RATE: 133 BPM
BASE EXCESS BLDV CALC-SCNC: -12.2 MMOL/L (ref -2–3)
BASOPHILS # BLD AUTO: 0.06 X10*3/UL (ref 0–0.1)
BASOPHILS NFR BLD AUTO: 0.4 %
BILIRUB SERPL-MCNC: 0.8 MG/DL (ref 0–1.2)
BILIRUB UR STRIP.AUTO-MCNC: NEGATIVE MG/DL
BODY TEMPERATURE: 37 DEGREES CELSIUS
BUN SERPL-MCNC: 27 MG/DL (ref 6–23)
CA-I BLDV-SCNC: 1.17 MMOL/L (ref 1.1–1.33)
CALCIUM SERPL-MCNC: 10.1 MG/DL (ref 8.6–10.6)
CHLORIDE BLDV-SCNC: 101 MMOL/L (ref 98–107)
CHLORIDE SERPL-SCNC: 100 MMOL/L (ref 98–107)
CO2 SERPL-SCNC: 16 MMOL/L (ref 21–32)
COLOR UR: YELLOW
CREAT SERPL-MCNC: 2.14 MG/DL (ref 0.5–1.3)
EGFRCR SERPLBLD CKD-EPI 2021: 41 ML/MIN/1.73M*2
EOSINOPHIL # BLD AUTO: 0.03 X10*3/UL (ref 0–0.7)
EOSINOPHIL NFR BLD AUTO: 0.2 %
ERYTHROCYTE [DISTWIDTH] IN BLOOD BY AUTOMATED COUNT: 12.6 % (ref 11.5–14.5)
FLUAV RNA RESP QL NAA+PROBE: NOT DETECTED
FLUBV RNA RESP QL NAA+PROBE: NOT DETECTED
GLUCOSE BLDV-MCNC: 129 MG/DL (ref 74–99)
GLUCOSE SERPL-MCNC: 99 MG/DL (ref 74–99)
GLUCOSE UR STRIP.AUTO-MCNC: NORMAL MG/DL
HCO3 BLDV-SCNC: 14.8 MMOL/L (ref 22–26)
HCT VFR BLD AUTO: 55 % (ref 41–52)
HCT VFR BLD EST: 61 % (ref 41–52)
HGB BLD-MCNC: 19.6 G/DL (ref 13.5–17.5)
HGB BLDV-MCNC: 20.3 G/DL (ref 13.5–17.5)
HOLD SPECIMEN: NORMAL
HYALINE CASTS #/AREA URNS AUTO: ABNORMAL /LPF
IMM GRANULOCYTES # BLD AUTO: 0.06 X10*3/UL (ref 0–0.7)
IMM GRANULOCYTES NFR BLD AUTO: 0.4 % (ref 0–0.9)
INHALED O2 CONCENTRATION: 21 %
INR PPP: 1.1 (ref 0.9–1.1)
KETONES UR STRIP.AUTO-MCNC: NEGATIVE MG/DL
LACTATE BLDV-SCNC: 4.5 MMOL/L (ref 0.4–2)
LEUKOCYTE ESTERASE UR QL STRIP.AUTO: ABNORMAL
LIPASE SERPL-CCNC: 25 U/L (ref 9–82)
LYMPHOCYTES # BLD AUTO: 4.5 X10*3/UL (ref 1.2–4.8)
LYMPHOCYTES NFR BLD AUTO: 29.2 %
MAGNESIUM SERPL-MCNC: 2.62 MG/DL (ref 1.6–2.4)
MCH RBC QN AUTO: 28.5 PG (ref 26–34)
MCHC RBC AUTO-ENTMCNC: 35.6 G/DL (ref 32–36)
MCV RBC AUTO: 80 FL (ref 80–100)
MONOCYTES # BLD AUTO: 1.56 X10*3/UL (ref 0.1–1)
MONOCYTES NFR BLD AUTO: 10.1 %
MUCOUS THREADS #/AREA URNS AUTO: ABNORMAL /LPF
NEUTROPHILS # BLD AUTO: 9.2 X10*3/UL (ref 1.2–7.7)
NEUTROPHILS NFR BLD AUTO: 59.7 %
NITRITE UR QL STRIP.AUTO: NEGATIVE
NRBC BLD-RTO: 0 /100 WBCS (ref 0–0)
OXYHGB MFR BLDV: 70.5 % (ref 45–75)
P AXIS: 84 DEGREES
P OFFSET: 209 MS
P ONSET: 159 MS
PCO2 BLDV: 37 MM HG (ref 41–51)
PH BLDV: 7.21 PH (ref 7.33–7.43)
PH UR STRIP.AUTO: 6 [PH]
PLATELET # BLD AUTO: 394 X10*3/UL (ref 150–450)
PO2 BLDV: 46 MM HG (ref 35–45)
POTASSIUM BLDV-SCNC: 4.6 MMOL/L (ref 3.5–5.3)
POTASSIUM SERPL-SCNC: 4.9 MMOL/L (ref 3.5–5.3)
PR INTERVAL: 124 MS
PROT SERPL-MCNC: 9.3 G/DL (ref 6.4–8.2)
PROT UR STRIP.AUTO-MCNC: ABNORMAL MG/DL
PROTHROMBIN TIME: 12 SECONDS (ref 9.8–12.8)
Q ONSET: 221 MS
QRS COUNT: 22 BEATS
QRS DURATION: 72 MS
QT INTERVAL: 292 MS
QTC CALCULATION(BAZETT): 434 MS
QTC FREDERICIA: 380 MS
R AXIS: 92 DEGREES
RBC # BLD AUTO: 6.88 X10*6/UL (ref 4.5–5.9)
RBC # UR STRIP.AUTO: ABNORMAL MG/DL
RBC #/AREA URNS AUTO: >20 /HPF
SAO2 % BLDV: 72 % (ref 45–75)
SARS-COV-2 RNA RESP QL NAA+PROBE: NOT DETECTED
SODIUM BLDV-SCNC: 127 MMOL/L (ref 136–145)
SODIUM SERPL-SCNC: 128 MMOL/L (ref 136–145)
SP GR UR STRIP.AUTO: 1.04
SQUAMOUS #/AREA URNS AUTO: ABNORMAL /HPF
T AXIS: 54 DEGREES
T OFFSET: 367 MS
UROBILINOGEN UR STRIP.AUTO-MCNC: ABNORMAL MG/DL
VENTRICULAR RATE: 133 BPM
WBC # BLD AUTO: 15.4 X10*3/UL (ref 4.4–11.3)
WBC #/AREA URNS AUTO: >50 /HPF

## 2025-02-19 PROCEDURE — 36415 COLL VENOUS BLD VENIPUNCTURE: CPT | Performed by: EMERGENCY MEDICINE

## 2025-02-19 PROCEDURE — 87040 BLOOD CULTURE FOR BACTERIA: CPT | Performed by: PHYSICIAN ASSISTANT

## 2025-02-19 PROCEDURE — 83690 ASSAY OF LIPASE: CPT

## 2025-02-19 PROCEDURE — 87086 URINE CULTURE/COLONY COUNT: CPT | Performed by: EMERGENCY MEDICINE

## 2025-02-19 PROCEDURE — 83605 ASSAY OF LACTIC ACID: CPT | Performed by: EMERGENCY MEDICINE

## 2025-02-19 PROCEDURE — 2500000004 HC RX 250 GENERAL PHARMACY W/ HCPCS (ALT 636 FOR OP/ED): Mod: SE | Performed by: PHYSICIAN ASSISTANT

## 2025-02-19 PROCEDURE — 2500000001 HC RX 250 WO HCPCS SELF ADMINISTERED DRUGS (ALT 637 FOR MEDICARE OP): Mod: SE

## 2025-02-19 PROCEDURE — 99223 1ST HOSP IP/OBS HIGH 75: CPT

## 2025-02-19 PROCEDURE — 2500000004 HC RX 250 GENERAL PHARMACY W/ HCPCS (ALT 636 FOR OP/ED): Mod: SE

## 2025-02-19 PROCEDURE — 96376 TX/PRO/DX INJ SAME DRUG ADON: CPT

## 2025-02-19 PROCEDURE — 93005 ELECTROCARDIOGRAM TRACING: CPT

## 2025-02-19 PROCEDURE — 85730 THROMBOPLASTIN TIME PARTIAL: CPT | Performed by: EMERGENCY MEDICINE

## 2025-02-19 PROCEDURE — 83735 ASSAY OF MAGNESIUM: CPT | Performed by: EMERGENCY MEDICINE

## 2025-02-19 PROCEDURE — 96372 THER/PROPH/DIAG INJ SC/IM: CPT | Performed by: PHYSICIAN ASSISTANT

## 2025-02-19 PROCEDURE — 96374 THER/PROPH/DIAG INJ IV PUSH: CPT | Mod: 59

## 2025-02-19 PROCEDURE — 85610 PROTHROMBIN TIME: CPT | Performed by: EMERGENCY MEDICINE

## 2025-02-19 PROCEDURE — 1200000002 HC GENERAL ROOM WITH TELEMETRY DAILY

## 2025-02-19 PROCEDURE — 85025 COMPLETE CBC W/AUTO DIFF WBC: CPT | Performed by: EMERGENCY MEDICINE

## 2025-02-19 PROCEDURE — 74176 CT ABD & PELVIS W/O CONTRAST: CPT | Performed by: STUDENT IN AN ORGANIZED HEALTH CARE EDUCATION/TRAINING PROGRAM

## 2025-02-19 PROCEDURE — 87636 SARSCOV2 & INF A&B AMP PRB: CPT | Performed by: EMERGENCY MEDICINE

## 2025-02-19 PROCEDURE — 96365 THER/PROPH/DIAG IV INF INIT: CPT | Mod: 59

## 2025-02-19 PROCEDURE — 2500000004 HC RX 250 GENERAL PHARMACY W/ HCPCS (ALT 636 FOR OP/ED): Mod: SE | Performed by: EMERGENCY MEDICINE

## 2025-02-19 PROCEDURE — 99285 EMERGENCY DEPT VISIT HI MDM: CPT | Mod: 25 | Performed by: EMERGENCY MEDICINE

## 2025-02-19 PROCEDURE — 99285 EMERGENCY DEPT VISIT HI MDM: CPT | Performed by: PHYSICIAN ASSISTANT

## 2025-02-19 PROCEDURE — 81001 URINALYSIS AUTO W/SCOPE: CPT | Performed by: EMERGENCY MEDICINE

## 2025-02-19 PROCEDURE — 93010 ELECTROCARDIOGRAM REPORT: CPT | Performed by: EMERGENCY MEDICINE

## 2025-02-19 PROCEDURE — 96361 HYDRATE IV INFUSION ADD-ON: CPT

## 2025-02-19 PROCEDURE — 84295 ASSAY OF SERUM SODIUM: CPT | Performed by: EMERGENCY MEDICINE

## 2025-02-19 PROCEDURE — 74176 CT ABD & PELVIS W/O CONTRAST: CPT

## 2025-02-19 RX ORDER — MORPHINE SULFATE 4 MG/ML
4 INJECTION INTRAVENOUS ONCE
Status: COMPLETED | OUTPATIENT
Start: 2025-02-19 | End: 2025-02-19

## 2025-02-19 RX ORDER — ONDANSETRON HYDROCHLORIDE 2 MG/ML
4 INJECTION, SOLUTION INTRAVENOUS ONCE
Status: COMPLETED | OUTPATIENT
Start: 2025-02-19 | End: 2025-02-19

## 2025-02-19 RX ORDER — SODIUM BICARBONATE 650 MG/1
650 TABLET ORAL 3 TIMES DAILY
Status: DISCONTINUED | OUTPATIENT
Start: 2025-02-19 | End: 2025-02-20

## 2025-02-19 RX ORDER — DICYCLOMINE HYDROCHLORIDE 10 MG/1
10 CAPSULE ORAL 4 TIMES DAILY PRN
Status: DISCONTINUED | OUTPATIENT
Start: 2025-02-19 | End: 2025-02-20 | Stop reason: HOSPADM

## 2025-02-19 RX ORDER — ONDANSETRON HYDROCHLORIDE 2 MG/ML
4 INJECTION, SOLUTION INTRAVENOUS EVERY 6 HOURS PRN
Status: DISCONTINUED | OUTPATIENT
Start: 2025-02-19 | End: 2025-02-20 | Stop reason: HOSPADM

## 2025-02-19 RX ORDER — ACETAMINOPHEN 325 MG/1
650 TABLET ORAL EVERY 8 HOURS PRN
Status: DISCONTINUED | OUTPATIENT
Start: 2025-02-19 | End: 2025-02-20 | Stop reason: HOSPADM

## 2025-02-19 RX ORDER — ONDANSETRON HYDROCHLORIDE 2 MG/ML
4 INJECTION, SOLUTION INTRAVENOUS ONCE
Status: DISCONTINUED | OUTPATIENT
Start: 2025-02-19 | End: 2025-02-20 | Stop reason: HOSPADM

## 2025-02-19 RX ADMIN — PIPERACILLIN SODIUM AND TAZOBACTAM SODIUM 3.38 G: 3; .375 INJECTION, SOLUTION INTRAVENOUS at 17:42

## 2025-02-19 RX ADMIN — SODIUM BICARBONATE 650 MG: 650 TABLET ORAL at 20:59

## 2025-02-19 RX ADMIN — ONDANSETRON 4 MG: 2 INJECTION INTRAMUSCULAR; INTRAVENOUS at 14:00

## 2025-02-19 RX ADMIN — SODIUM CHLORIDE, POTASSIUM CHLORIDE, SODIUM LACTATE AND CALCIUM CHLORIDE 1000 ML: 600; 310; 30; 20 INJECTION, SOLUTION INTRAVENOUS at 14:04

## 2025-02-19 RX ADMIN — SODIUM CHLORIDE, POTASSIUM CHLORIDE, SODIUM LACTATE AND CALCIUM CHLORIDE 1000 ML: 600; 310; 30; 20 INJECTION, SOLUTION INTRAVENOUS at 20:53

## 2025-02-19 RX ADMIN — ACETAMINOPHEN 650 MG: 325 TABLET ORAL at 20:59

## 2025-02-19 RX ADMIN — PIPERACILLIN SODIUM AND TAZOBACTAM SODIUM 3.38 G: 3; .375 INJECTION, SOLUTION INTRAVENOUS at 20:54

## 2025-02-19 RX ADMIN — MORPHINE SULFATE 4 MG: 4 INJECTION INTRAVENOUS at 14:03

## 2025-02-19 RX ADMIN — MORPHINE SULFATE 4 MG: 4 INJECTION INTRAVENOUS at 17:48

## 2025-02-19 SDOH — ECONOMIC STABILITY: HOUSING INSECURITY: AT ANY TIME IN THE PAST 12 MONTHS, WERE YOU HOMELESS OR LIVING IN A SHELTER (INCLUDING NOW)?: NO

## 2025-02-19 SDOH — ECONOMIC STABILITY: TRANSPORTATION INSECURITY: IN THE PAST 12 MONTHS, HAS LACK OF TRANSPORTATION KEPT YOU FROM MEDICAL APPOINTMENTS OR FROM GETTING MEDICATIONS?: NO

## 2025-02-19 SDOH — SOCIAL STABILITY: SOCIAL INSECURITY
WITHIN THE LAST YEAR, HAVE YOU BEEN RAPED OR FORCED TO HAVE ANY KIND OF SEXUAL ACTIVITY BY YOUR PARTNER OR EX-PARTNER?: NO

## 2025-02-19 SDOH — SOCIAL STABILITY: SOCIAL INSECURITY: WITHIN THE LAST YEAR, HAVE YOU BEEN HUMILIATED OR EMOTIONALLY ABUSED IN OTHER WAYS BY YOUR PARTNER OR EX-PARTNER?: NO

## 2025-02-19 SDOH — ECONOMIC STABILITY: FOOD INSECURITY: WITHIN THE PAST 12 MONTHS, THE FOOD YOU BOUGHT JUST DIDN'T LAST AND YOU DIDN'T HAVE MONEY TO GET MORE.: NEVER TRUE

## 2025-02-19 SDOH — SOCIAL STABILITY: SOCIAL INSECURITY
WITHIN THE LAST YEAR, HAVE YOU BEEN KICKED, HIT, SLAPPED, OR OTHERWISE PHYSICALLY HURT BY YOUR PARTNER OR EX-PARTNER?: NO

## 2025-02-19 SDOH — ECONOMIC STABILITY: FOOD INSECURITY: WITHIN THE PAST 12 MONTHS, YOU WORRIED THAT YOUR FOOD WOULD RUN OUT BEFORE YOU GOT THE MONEY TO BUY MORE.: NEVER TRUE

## 2025-02-19 SDOH — SOCIAL STABILITY: SOCIAL INSECURITY: WERE YOU ABLE TO COMPLETE ALL THE BEHAVIORAL HEALTH SCREENINGS?: YES

## 2025-02-19 SDOH — SOCIAL STABILITY: SOCIAL INSECURITY: HAVE YOU HAD ANY THOUGHTS OF HARMING ANYONE ELSE?: NO

## 2025-02-19 SDOH — SOCIAL STABILITY: SOCIAL INSECURITY: WITHIN THE LAST YEAR, HAVE YOU BEEN AFRAID OF YOUR PARTNER OR EX-PARTNER?: NO

## 2025-02-19 SDOH — ECONOMIC STABILITY: FOOD INSECURITY: HOW HARD IS IT FOR YOU TO PAY FOR THE VERY BASICS LIKE FOOD, HOUSING, MEDICAL CARE, AND HEATING?: NOT VERY HARD

## 2025-02-19 SDOH — ECONOMIC STABILITY: HOUSING INSECURITY: IN THE LAST 12 MONTHS, WAS THERE A TIME WHEN YOU WERE NOT ABLE TO PAY THE MORTGAGE OR RENT ON TIME?: NO

## 2025-02-19 SDOH — HEALTH STABILITY: PHYSICAL HEALTH
HOW OFTEN DO YOU NEED TO HAVE SOMEONE HELP YOU WHEN YOU READ INSTRUCTIONS, PAMPHLETS, OR OTHER WRITTEN MATERIAL FROM YOUR DOCTOR OR PHARMACY?: NEVER

## 2025-02-19 SDOH — ECONOMIC STABILITY: HOUSING INSECURITY: IN THE PAST 12 MONTHS, HOW MANY TIMES HAVE YOU MOVED WHERE YOU WERE LIVING?: 0

## 2025-02-19 SDOH — ECONOMIC STABILITY: INCOME INSECURITY: IN THE PAST 12 MONTHS HAS THE ELECTRIC, GAS, OIL, OR WATER COMPANY THREATENED TO SHUT OFF SERVICES IN YOUR HOME?: NO

## 2025-02-19 SDOH — SOCIAL STABILITY: SOCIAL INSECURITY: ARE THERE ANY APPARENT SIGNS OF INJURIES/BEHAVIORS THAT COULD BE RELATED TO ABUSE/NEGLECT?: NO

## 2025-02-19 SDOH — SOCIAL STABILITY: SOCIAL INSECURITY: ABUSE: ADULT

## 2025-02-19 SDOH — SOCIAL STABILITY: SOCIAL INSECURITY: HAVE YOU HAD THOUGHTS OF HARMING ANYONE ELSE?: NO

## 2025-02-19 SDOH — SOCIAL STABILITY: SOCIAL INSECURITY: DOES ANYONE TRY TO KEEP YOU FROM HAVING/CONTACTING OTHER FRIENDS OR DOING THINGS OUTSIDE YOUR HOME?: NO

## 2025-02-19 SDOH — SOCIAL STABILITY: SOCIAL INSECURITY: ARE YOU OR HAVE YOU BEEN THREATENED OR ABUSED PHYSICALLY, EMOTIONALLY, OR SEXUALLY BY ANYONE?: NO

## 2025-02-19 SDOH — SOCIAL STABILITY: SOCIAL INSECURITY: DO YOU FEEL ANYONE HAS EXPLOITED OR TAKEN ADVANTAGE OF YOU FINANCIALLY OR OF YOUR PERSONAL PROPERTY?: NO

## 2025-02-19 SDOH — SOCIAL STABILITY: SOCIAL INSECURITY: DO YOU FEEL UNSAFE GOING BACK TO THE PLACE WHERE YOU ARE LIVING?: NO

## 2025-02-19 SDOH — SOCIAL STABILITY: SOCIAL INSECURITY: HAS ANYONE EVER THREATENED TO HURT YOUR FAMILY OR YOUR PETS?: NO

## 2025-02-19 ASSESSMENT — COLUMBIA-SUICIDE SEVERITY RATING SCALE - C-SSRS
6. HAVE YOU EVER DONE ANYTHING, STARTED TO DO ANYTHING, OR PREPARED TO DO ANYTHING TO END YOUR LIFE?: NO
2. HAVE YOU ACTUALLY HAD ANY THOUGHTS OF KILLING YOURSELF?: NO
1. IN THE PAST MONTH, HAVE YOU WISHED YOU WERE DEAD OR WISHED YOU COULD GO TO SLEEP AND NOT WAKE UP?: NO

## 2025-02-19 ASSESSMENT — PAIN SCALES - GENERAL
PAINLEVEL_OUTOF10: 3
PAINLEVEL_OUTOF10: 7
PAINLEVEL_OUTOF10: 0 - NO PAIN
PAINLEVEL_OUTOF10: 7

## 2025-02-19 ASSESSMENT — COGNITIVE AND FUNCTIONAL STATUS - GENERAL
PATIENT BASELINE BEDBOUND: NO
MOBILITY SCORE: 24
MOBILITY SCORE: 24
DAILY ACTIVITIY SCORE: 24
DAILY ACTIVITIY SCORE: 24

## 2025-02-19 ASSESSMENT — PATIENT HEALTH QUESTIONNAIRE - PHQ9
1. LITTLE INTEREST OR PLEASURE IN DOING THINGS: NOT AT ALL
2. FEELING DOWN, DEPRESSED OR HOPELESS: NOT AT ALL
SUM OF ALL RESPONSES TO PHQ9 QUESTIONS 1 & 2: 0

## 2025-02-19 ASSESSMENT — ACTIVITIES OF DAILY LIVING (ADL)
HEARING - RIGHT EAR: FUNCTIONAL
LACK_OF_TRANSPORTATION: NO
DRESSING YOURSELF: INDEPENDENT
ADEQUATE_TO_COMPLETE_ADL: YES
WALKS IN HOME: INDEPENDENT
PATIENT'S MEMORY ADEQUATE TO SAFELY COMPLETE DAILY ACTIVITIES?: YES
GROOMING: INDEPENDENT
TOILETING: INDEPENDENT
HEARING - LEFT EAR: FUNCTIONAL
BATHING: INDEPENDENT
LACK_OF_TRANSPORTATION: NO
JUDGMENT_ADEQUATE_SAFELY_COMPLETE_DAILY_ACTIVITIES: YES
FEEDING YOURSELF: INDEPENDENT

## 2025-02-19 ASSESSMENT — PAIN DESCRIPTION - PAIN TYPE: TYPE: ACUTE PAIN

## 2025-02-19 ASSESSMENT — PAIN DESCRIPTION - LOCATION
LOCATION: ABDOMEN
LOCATION: ABDOMEN

## 2025-02-19 ASSESSMENT — LIFESTYLE VARIABLES
HOW OFTEN DO YOU HAVE 6 OR MORE DRINKS ON ONE OCCASION: NEVER
AUDIT-C TOTAL SCORE: 0
SKIP TO QUESTIONS 9-10: 1
AUDIT-C TOTAL SCORE: 0
HOW OFTEN DO YOU HAVE A DRINK CONTAINING ALCOHOL: NEVER
HOW MANY STANDARD DRINKS CONTAINING ALCOHOL DO YOU HAVE ON A TYPICAL DAY: PATIENT DOES NOT DRINK

## 2025-02-19 ASSESSMENT — PAIN - FUNCTIONAL ASSESSMENT: PAIN_FUNCTIONAL_ASSESSMENT: 0-10

## 2025-02-19 NOTE — ED TRIAGE NOTES
Patient presents to the Emergency department with a chief complaint of generalized body aches, nausea, vomiting, chills, abdominal pain, and hematuria x4 days. Patient has a history of prostate cancer and familial adenomatous polyposis with an ostomy.

## 2025-02-19 NOTE — ED PROVIDER NOTES
Emergency Department Provider Note        History of Present Illness     History provided by: Patient  Limitations to History: None  External Records Reviewed with Brief Summary:  pm[facility]x    HPI:  Ingrid Maurice is a 34 y.o. male with history of prostate cancer adenomatous polyposis with an ostomy who presents today for evaluation of nausea vomiting abdominal pain, flank pain, myalgias.  Patient symptoms started about 3 to 4 days ago, states he is having about 4 episodes of nonbloody vomiting daily with the last one being last night after eating [employer], patient has been tolerating oral fluids, denies any diarrhea although is noted to have some liquid stool in his ostomy bag.  He denies any fevers but does endorse chills, also endorses myalgias.  Denies any upper respiratory symptoms such as nasal congestion, rhinorrhea, sore throat, cough.  Denies any chest pain but does endorse shortness of breath.  He does endorse some urinary frequency but no burning, no urgency, he denies seeing any blood in his urine but that was told by his doctor and urinalysis that he did have some blood.  States that he is feeling dehydrated, he also states that he usually gets morphine for pain and is requesting some today.    Physical Exam   Triage vitals:  T 36.2 °C (97.2 °F)  HR (!) 114  /74  RR 18  O2 100 % None (Room air)    Physical Exam  Vitals and nursing note reviewed.   Constitutional:       General: He is not in acute distress.     Appearance: Normal appearance. He is not toxic-appearing.   HENT:      Head: Normocephalic and atraumatic.      Nose: Nose normal.      Mouth/Throat:      Mouth: Mucous membranes are moist.      Pharynx: No oropharyngeal exudate or posterior oropharyngeal erythema.   Eyes:      Extraocular Movements: Extraocular movements intact.   Cardiovascular:      Rate and Rhythm: Normal rate and regular rhythm.   Pulmonary:      Effort: Pulmonary effort is normal.      Breath sounds: Wheezing (Mild  diffuse end expiratory wheezing.) present.   Abdominal:      General: There is no distension.      Palpations: Abdomen is soft.      Tenderness: There is abdominal tenderness.      Comments: Diffuse abdominal tenderness and left CVA tenderness, large midline incision with ostomy bag in place.  Ostomy bag noted to have liquid green-tinged stool.   Musculoskeletal:         General: Normal range of motion.      Cervical back: Normal range of motion and neck supple.   Skin:     General: Skin is warm and dry.   Neurological:      General: No focal deficit present.      Mental Status: He is alert and oriented to person, place, and time.      Cranial Nerves: Cranial nerves 2-12 are intact.      Sensory: Sensation is intact.      Motor: Motor function is intact. No weakness, seizure activity or pronator drift.   Psychiatric:         Mood and Affect: Mood normal.         Thought Content: Thought content normal.        CT abdomen pelvis wo IV contrast    (Results Pending)     Labs Reviewed   CBC WITH AUTO DIFFERENTIAL - Abnormal       Result Value    WBC 15.4 (*)     nRBC 0.0      RBC 6.88 (*)     Hemoglobin 19.6 (*)     Hematocrit 55.0 (*)     MCV 80      MCH 28.5      MCHC 35.6      RDW 12.6      Platelets 394      Neutrophils % 59.7      Immature Granulocytes %, Automated 0.4      Lymphocytes % 29.2      Monocytes % 10.1      Eosinophils % 0.2      Basophils % 0.4      Neutrophils Absolute 9.20 (*)     Immature Granulocytes Absolute, Automated 0.06      Lymphocytes Absolute 4.50      Monocytes Absolute 1.56 (*)     Eosinophils Absolute 0.03      Basophils Absolute 0.06     APTT - Abnormal    aPTT 25 (*)     Narrative:     The APTT is no longer used for monitoring Unfractionated Heparin Therapy. For monitoring Heparin Therapy, use the Heparin Assay.  INTERFERENCE DETECTED. The result may be affected due to hemolysis, icterus, lipemia, or other interferents. Clinical correlation is recommended. Repeat testing may be  considered.   BLOOD GAS VENOUS FULL PANEL - Abnormal    POCT pH, Venous 7.21 (*)     POCT pCO2, Venous 37 (*)     POCT pO2, Venous 46 (*)     POCT SO2, Venous 72      POCT Oxy Hemoglobin, Venous 70.5      POCT Hematocrit Calculated, Venous 61.0 (*)     POCT Sodium, Venous 127 (*)     POCT Potassium, Venous 4.6      POCT Chloride, Venous 101      POCT Ionized Calicum, Venous 1.17      POCT Glucose, Venous 129 (*)     POCT Lactate, Venous 4.5 (*)     POCT Base Excess, Venous -12.2 (*)     POCT HCO3 Calculated, Venous 14.8 (*)     POCT Hemoglobin, Venous 20.3 (*)     POCT Anion Gap, Venous 16.0      Patient Temperature 37.0      FiO2 21     PROTIME-INR - Normal    Protime 12.0      INR 1.1      Narrative:     INTERFERENCE DETECTED. The result may be affected due to hemolysis, icterus, lipemia, or other interferents. Clinical correlation is recommended. Repeat testing may be considered.   BLOOD CULTURE   BLOOD CULTURE   URINALYSIS WITH REFLEX CULTURE AND MICROSCOPIC    Narrative:     The following orders were created for panel order Urinalysis with Reflex Culture and Microscopic.  Procedure                               Abnormality         Status                     ---------                               -----------         ------                     Urinalysis with Reflex C...[[id number]]                                                 Extra Urine Gray Tube[[id number]]                                                         Please view results for these tests on the individual orders.   MAGNESIUM   COMPREHENSIVE METABOLIC PANEL   PHOSPHORUS   LIPASE   SARS-COV-2 AND INFLUENZA A/B PCR   URINALYSIS WITH REFLEX CULTURE AND MICROSCOPIC   EXTRA URINE GRAY TUBE   BLOOD GAS LACTIC ACID, VENOUS     ED Course as of 02/19/25 1520   Wed Feb 19, 2025   1413 ECG with a sinus tachycardia rate of 133, normal intervals, slight rightward axis deviation, normal ST segments and T waves. [YT]   1457 Blood cultures ordered, suspect stress  response from vomiting, no indication for abx at this time as patient is not tachycardic and no source [MK]      ED Course User Index  [MK] Amanda Robledo PA-C  [YT] Chary Rocha MD         Medical Decision Making & ED Course   Medical Decision Makin y.o. male presents today for evaluation of nausea vomiting abdominal pain and flank pain, patient states it feels like kidney infection.  Given fluids morphine, Zofran, CT abdomen pelvis without contrast was obtained taking into account that he is likely dehydrated and only has 1 kidney, basic labs including EKG were obtained. Patient signed out to incoming team pending workup.l  ----      Differential diagnoses considered include but are not limited to: sbo, pyelonephritis, flu, covid, sara, dehydration, etc.     Social Determinants of Health which Significantly Impact Care: None identified     EKG Independent Interpretation: EKG interpreted by myself. Please see ED Course for full interpretation.    Independent Result Review and Interpretation: Relevant laboratory and radiographic results were reviewed and independently interpreted by myself.  As necessary, they are commented on in the ED Course.    Chronic conditions affecting the patient's care: As documented above in [facility]    The patient was discussed with the following consultants/services: None    Care Considerations: As documented above in [facility]    ED Course:  ED Course as of 25 1520   Wed 2025   1413 ECG with a sinus tachycardia rate of 133, normal intervals, slight rightward axis deviation, normal ST segments and T waves. [YT]   1457 Blood cultures ordered, suspect stress response from vomiting, no indication for abx at this time as patient is not tachycardic and no source [MK]      ED Course User Index  [MK] Amanda Robledo PA-C  [YT] Chary Rocha MD     Disposition   Patient was signed out to Stephan Brooks at 3pm pending completion of their work-up.  Please see the next provider's  transition of care note for the remainder of the patient's care.     Procedures   Procedures    This was a shared visit with an ED attending.  The patient was seen and discussed with the ED attending    Amanda Robledo PA-C  Emergency Medicine       Amanda Robledo PA-C  02/19/25 6907

## 2025-02-19 NOTE — PROGRESS NOTES
Ingrid Maurice is a 34 y.o. male with history of prostate cancer adenomatous polyposis with an ostomy who presents today for evaluation of nausea vomiting abdominal pain, flank pain, myalgias.  This patient was a signout to me and during the time of signout patient was pending CT abdomen pelvis.  Patient CT abdomen pelvis was unremarkable.  During presentation patient was acidotic with pH of 7.21.  Patient also had a lactate of 4.5.  Patient had leukocytosis of 15.4.  Patient was started on Zosyn.  With patient history of recurrent kidney stones and CVA tenderness on exam, patient most likely have pyelonephritis.  Patient was admitted to the medicine team.

## 2025-02-20 VITALS
OXYGEN SATURATION: 97 % | HEIGHT: 68 IN | HEART RATE: 93 BPM | SYSTOLIC BLOOD PRESSURE: 102 MMHG | RESPIRATION RATE: 18 BRPM | BODY MASS INDEX: 22.72 KG/M2 | DIASTOLIC BLOOD PRESSURE: 69 MMHG | TEMPERATURE: 98.6 F | WEIGHT: 149.91 LBS

## 2025-02-20 LAB
ALBUMIN SERPL BCP-MCNC: 3.8 G/DL (ref 3.4–5)
ALBUMIN SERPL BCP-MCNC: 4 G/DL (ref 3.4–5)
ALP SERPL-CCNC: 76 U/L (ref 33–120)
ALP SERPL-CCNC: 84 U/L (ref 33–120)
ALT SERPL W P-5'-P-CCNC: 11 U/L (ref 10–52)
ALT SERPL W P-5'-P-CCNC: 13 U/L (ref 10–52)
ANION GAP BLDV CALCULATED.4IONS-SCNC: 14 MMOL/L (ref 10–25)
ANION GAP SERPL CALC-SCNC: 13 MMOL/L (ref 10–20)
ANION GAP SERPL CALC-SCNC: 9 MMOL/L (ref 10–20)
AST SERPL W P-5'-P-CCNC: 14 U/L (ref 9–39)
AST SERPL W P-5'-P-CCNC: 15 U/L (ref 9–39)
BASE EXCESS BLDV CALC-SCNC: -4.4 MMOL/L (ref -2–3)
BASOPHILS # BLD AUTO: 0.04 X10*3/UL (ref 0–0.1)
BASOPHILS # BLD AUTO: 0.05 X10*3/UL (ref 0–0.1)
BASOPHILS NFR BLD AUTO: 0.5 %
BASOPHILS NFR BLD AUTO: 0.5 %
BILIRUB SERPL-MCNC: 0.4 MG/DL (ref 0–1.2)
BILIRUB SERPL-MCNC: 0.7 MG/DL (ref 0–1.2)
BODY TEMPERATURE: 37 DEGREES CELSIUS
BUN SERPL-MCNC: 18 MG/DL (ref 6–23)
BUN SERPL-MCNC: 25 MG/DL (ref 6–23)
CA-I BLDV-SCNC: 1.19 MMOL/L (ref 1.1–1.33)
CALCIUM SERPL-MCNC: 8.5 MG/DL (ref 8.6–10.6)
CALCIUM SERPL-MCNC: 8.7 MG/DL (ref 8.6–10.6)
CHLORIDE BLDV-SCNC: 99 MMOL/L (ref 98–107)
CHLORIDE SERPL-SCNC: 101 MMOL/L (ref 98–107)
CHLORIDE SERPL-SCNC: 103 MMOL/L (ref 98–107)
CO2 SERPL-SCNC: 22 MMOL/L (ref 21–32)
CO2 SERPL-SCNC: 27 MMOL/L (ref 21–32)
CREAT SERPL-MCNC: 1.7 MG/DL (ref 0.5–1.3)
CREAT SERPL-MCNC: 2 MG/DL (ref 0.5–1.3)
EGFRCR SERPLBLD CKD-EPI 2021: 44 ML/MIN/1.73M*2
EGFRCR SERPLBLD CKD-EPI 2021: 54 ML/MIN/1.73M*2
EOSINOPHIL # BLD AUTO: 0.08 X10*3/UL (ref 0–0.7)
EOSINOPHIL # BLD AUTO: 0.13 X10*3/UL (ref 0–0.7)
EOSINOPHIL NFR BLD AUTO: 1 %
EOSINOPHIL NFR BLD AUTO: 1.3 %
ERYTHROCYTE [DISTWIDTH] IN BLOOD BY AUTOMATED COUNT: 12.3 % (ref 11.5–14.5)
ERYTHROCYTE [DISTWIDTH] IN BLOOD BY AUTOMATED COUNT: 12.4 % (ref 11.5–14.5)
GLUCOSE BLDV-MCNC: 104 MG/DL (ref 74–99)
GLUCOSE SERPL-MCNC: 69 MG/DL (ref 74–99)
GLUCOSE SERPL-MCNC: 83 MG/DL (ref 74–99)
HCO3 BLDV-SCNC: 21.6 MMOL/L (ref 22–26)
HCT VFR BLD AUTO: 37.6 % (ref 41–52)
HCT VFR BLD AUTO: 42.3 % (ref 41–52)
HCT VFR BLD EST: 47 % (ref 41–52)
HGB BLD-MCNC: 13.5 G/DL (ref 13.5–17.5)
HGB BLD-MCNC: 14.6 G/DL (ref 13.5–17.5)
HGB BLDV-MCNC: 15.6 G/DL (ref 13.5–17.5)
HOLD SPECIMEN: NORMAL
IMM GRANULOCYTES # BLD AUTO: 0.02 X10*3/UL (ref 0–0.7)
IMM GRANULOCYTES # BLD AUTO: 0.03 X10*3/UL (ref 0–0.7)
IMM GRANULOCYTES NFR BLD AUTO: 0.3 % (ref 0–0.9)
IMM GRANULOCYTES NFR BLD AUTO: 0.3 % (ref 0–0.9)
INHALED O2 CONCENTRATION: 21 %
LACTATE BLDV-SCNC: 1.6 MMOL/L (ref 0.4–2)
LACTATE SERPL-SCNC: 1 MMOL/L (ref 0.4–2)
LYMPHOCYTES # BLD AUTO: 2.37 X10*3/UL (ref 1.2–4.8)
LYMPHOCYTES # BLD AUTO: 3.83 X10*3/UL (ref 1.2–4.8)
LYMPHOCYTES NFR BLD AUTO: 30.3 %
LYMPHOCYTES NFR BLD AUTO: 39.4 %
MAGNESIUM SERPL-MCNC: 2.18 MG/DL (ref 1.6–2.4)
MAGNESIUM SERPL-MCNC: 2.28 MG/DL (ref 1.6–2.4)
MCH RBC QN AUTO: 28.3 PG (ref 26–34)
MCH RBC QN AUTO: 29 PG (ref 26–34)
MCHC RBC AUTO-ENTMCNC: 34.5 G/DL (ref 32–36)
MCHC RBC AUTO-ENTMCNC: 35.9 G/DL (ref 32–36)
MCV RBC AUTO: 81 FL (ref 80–100)
MCV RBC AUTO: 82 FL (ref 80–100)
MONOCYTES # BLD AUTO: 1.03 X10*3/UL (ref 0.1–1)
MONOCYTES # BLD AUTO: 1.18 X10*3/UL (ref 0.1–1)
MONOCYTES NFR BLD AUTO: 12.2 %
MONOCYTES NFR BLD AUTO: 13.2 %
NEUTROPHILS # BLD AUTO: 4.27 X10*3/UL (ref 1.2–7.7)
NEUTROPHILS # BLD AUTO: 4.49 X10*3/UL (ref 1.2–7.7)
NEUTROPHILS NFR BLD AUTO: 46.3 %
NEUTROPHILS NFR BLD AUTO: 54.7 %
NRBC BLD-RTO: 0 /100 WBCS (ref 0–0)
NRBC BLD-RTO: 0 /100 WBCS (ref 0–0)
OXYHGB MFR BLDV: 80.1 % (ref 45–75)
PCO2 BLDV: 42 MM HG (ref 41–51)
PH BLDV: 7.32 PH (ref 7.33–7.43)
PLATELET # BLD AUTO: 258 X10*3/UL (ref 150–450)
PLATELET # BLD AUTO: 280 X10*3/UL (ref 150–450)
PO2 BLDV: 48 MM HG (ref 35–45)
POTASSIUM BLDV-SCNC: 3.6 MMOL/L (ref 3.5–5.3)
POTASSIUM SERPL-SCNC: 3.3 MMOL/L (ref 3.5–5.3)
POTASSIUM SERPL-SCNC: 4.2 MMOL/L (ref 3.5–5.3)
PROT SERPL-MCNC: 6.8 G/DL (ref 6.4–8.2)
PROT SERPL-MCNC: 7.5 G/DL (ref 6.4–8.2)
RBC # BLD AUTO: 4.65 X10*6/UL (ref 4.5–5.9)
RBC # BLD AUTO: 5.15 X10*6/UL (ref 4.5–5.9)
SAO2 % BLDV: 82 % (ref 45–75)
SODIUM BLDV-SCNC: 131 MMOL/L (ref 136–145)
SODIUM SERPL-SCNC: 133 MMOL/L (ref 136–145)
SODIUM SERPL-SCNC: 135 MMOL/L (ref 136–145)
WBC # BLD AUTO: 7.8 X10*3/UL (ref 4.4–11.3)
WBC # BLD AUTO: 9.7 X10*3/UL (ref 4.4–11.3)

## 2025-02-20 PROCEDURE — 2500000001 HC RX 250 WO HCPCS SELF ADMINISTERED DRUGS (ALT 637 FOR MEDICARE OP): Mod: SE

## 2025-02-20 PROCEDURE — 99239 HOSP IP/OBS DSCHRG MGMT >30: CPT

## 2025-02-20 PROCEDURE — 83735 ASSAY OF MAGNESIUM: CPT

## 2025-02-20 PROCEDURE — 82435 ASSAY OF BLOOD CHLORIDE: CPT

## 2025-02-20 PROCEDURE — 36415 COLL VENOUS BLD VENIPUNCTURE: CPT

## 2025-02-20 PROCEDURE — 80053 COMPREHEN METABOLIC PANEL: CPT

## 2025-02-20 PROCEDURE — 83605 ASSAY OF LACTIC ACID: CPT

## 2025-02-20 PROCEDURE — 85025 COMPLETE CBC W/AUTO DIFF WBC: CPT

## 2025-02-20 PROCEDURE — 2500000004 HC RX 250 GENERAL PHARMACY W/ HCPCS (ALT 636 FOR OP/ED): Mod: SE

## 2025-02-20 RX ORDER — SODIUM CHLORIDE, SODIUM LACTATE, POTASSIUM CHLORIDE, CALCIUM CHLORIDE 600; 310; 30; 20 MG/100ML; MG/100ML; MG/100ML; MG/100ML
1000 INJECTION, SOLUTION INTRAVENOUS CONTINUOUS
Status: ACTIVE | OUTPATIENT
Start: 2025-02-20 | End: 2025-02-20

## 2025-02-20 RX ORDER — OXYCODONE HYDROCHLORIDE 5 MG/1
5 TABLET ORAL EVERY 6 HOURS PRN
Status: DISCONTINUED | OUTPATIENT
Start: 2025-02-20 | End: 2025-02-20

## 2025-02-20 RX ORDER — LIDOCAINE 560 MG/1
1 PATCH PERCUTANEOUS; TOPICAL; TRANSDERMAL DAILY
Status: DISCONTINUED | OUTPATIENT
Start: 2025-02-20 | End: 2025-02-20 | Stop reason: HOSPADM

## 2025-02-20 RX ORDER — ONDANSETRON 4 MG/1
4 TABLET, ORALLY DISINTEGRATING ORAL EVERY 8 HOURS PRN
Qty: 20 TABLET | Refills: 0 | Status: SHIPPED | OUTPATIENT
Start: 2025-02-20

## 2025-02-20 RX ORDER — LIDOCAINE 560 MG/1
1 PATCH PERCUTANEOUS; TOPICAL; TRANSDERMAL DAILY
Qty: 30 PATCH | Refills: 0 | Status: SHIPPED | OUTPATIENT
Start: 2025-02-21 | End: 2025-03-23

## 2025-02-20 RX ORDER — DICYCLOMINE HYDROCHLORIDE 10 MG/1
10 CAPSULE ORAL 4 TIMES DAILY PRN
Qty: 30 CAPSULE | Refills: 0 | Status: SHIPPED | OUTPATIENT
Start: 2025-02-20 | End: 2025-03-22

## 2025-02-20 RX ORDER — POTASSIUM CHLORIDE 14.9 MG/ML
20 INJECTION INTRAVENOUS
Status: COMPLETED | OUTPATIENT
Start: 2025-02-20 | End: 2025-02-20

## 2025-02-20 RX ADMIN — ACETAMINOPHEN 650 MG: 325 TABLET ORAL at 13:09

## 2025-02-20 RX ADMIN — POTASSIUM CHLORIDE 20 MEQ: 14.9 INJECTION, SOLUTION INTRAVENOUS at 12:09

## 2025-02-20 RX ADMIN — PIPERACILLIN SODIUM AND TAZOBACTAM SODIUM 3.38 G: 3; .375 INJECTION, SOLUTION INTRAVENOUS at 01:34

## 2025-02-20 RX ADMIN — SODIUM CHLORIDE, POTASSIUM CHLORIDE, SODIUM LACTATE AND CALCIUM CHLORIDE 1000 ML: 600; 310; 30; 20 INJECTION, SOLUTION INTRAVENOUS at 08:44

## 2025-02-20 RX ADMIN — POTASSIUM CHLORIDE 20 MEQ: 14.9 INJECTION, SOLUTION INTRAVENOUS at 14:00

## 2025-02-20 RX ADMIN — OXYCODONE 5 MG: 5 TABLET ORAL at 01:42

## 2025-02-20 RX ADMIN — OXYCODONE 5 MG: 5 TABLET ORAL at 07:49

## 2025-02-20 ASSESSMENT — PAIN SCALES - GENERAL
PAINLEVEL_OUTOF10: 0 - NO PAIN
PAINLEVEL_OUTOF10: 7
PAINLEVEL_OUTOF10: 5 - MODERATE PAIN
PAINLEVEL_OUTOF10: 3
PAINLEVEL_OUTOF10: 7

## 2025-02-20 ASSESSMENT — PAIN - FUNCTIONAL ASSESSMENT
PAIN_FUNCTIONAL_ASSESSMENT: 0-10
PAIN_FUNCTIONAL_ASSESSMENT: 0-10

## 2025-02-20 ASSESSMENT — ACTIVITIES OF DAILY LIVING (ADL): LACK_OF_TRANSPORTATION: NO

## 2025-02-20 ASSESSMENT — PAIN DESCRIPTION - LOCATION
LOCATION: ABDOMEN
LOCATION: ABDOMEN

## 2025-02-20 ASSESSMENT — PAIN DESCRIPTION - ORIENTATION
ORIENTATION: LEFT;LOWER
ORIENTATION: LEFT;LOWER

## 2025-02-20 NOTE — HOSPITAL COURSE
Ingrid DEISI Josafat, 34-year-old male with Faust syndrome (s/p proctocolectomy, end ileostomy, J pouch), hydronephrosis (s/p R nephrectomy 2018), mesenteric desmoid tumor, abdominal fibromatosis, and bipolar disorder.     Presented 2/19/2025 with flank pain, abdominal cramps, nausea, and vomiting. Noticed worsening right flank pain, decreased urine and colostomy output, followed by progressive nausea and ~3 daily NBNB emesis episodes since 2/14/2025.     Flank pain constant at 9/10, now improved; chronic abdominal cramps unchanged. Tolerating PO intake despite vomiting and dry heaves, unrelated to eating/drinking. Endorses occasional chills and myalgias. Outpatient labs showed elevated creatinine, prompting ED visit per physician. ROS otherwise negative: Denies headaches, dizziness, fever, URI symptoms, chest pain, shortness of breath, gross hematuria, leg swelling, melena, hematochezia, or colostomy output changes.     This mornign when I saw him he was in his bed Djing making music, pain was well controlled but tender on palpation. Colostomy in in RLQ place, stoma was pinkish red, and moist, no visible leakage, stool was brown. Rest of physical exam WNL.     Assesment:   This is a patient presenting with elevated lactate and acidosis likely 2/2 to poor PO intake. Was very dry on exam. Will need to follow outpatient ith GI to see what is triggering these nausea/vomiting spells. Zofran inpatient controlling n/v very well. Will discharge with some.    His labs came back stable so we will discontinued bicarb, and discontinued zosyn as we do not suspect sepsis or UTI. Adde dlidocaine for pain. Overall patient is stable. Will follow afternoon labs to see if Cr improving, if so, can doscharge patient    Plan   - 1L LR bolus over 2 hr  - Completed bicarb   - Stopped zosyn   - Zofran for nausea     Outpatient  - PCP   - GI regeral   - Zofran on discharge

## 2025-02-20 NOTE — PROGRESS NOTES
Pharmacy Medication History Review    Ingrid Maurice is a 34 y.o. male admitted for Sepsis, due to unspecified organism, unspecified whether acute organ dysfunction present (Multi). Pharmacy reviewed the patient's [id number] medications and allergies for accuracy.    Medications ADDED  N/A  Medications CHANGED  N/A  Medications REMOVED/NOT TAKING   Dicyclomine 10 mg     The list below reflects the updated PTA list.   Prior to Admission Medications   Prescriptions Last Dose Informant   dicyclomine (Bentyl) 10 mg capsule  Self   Sig: Take 1 capsule (10 mg) by mouth 4 times a day as needed (abdominal pain or cramps).   Patient not taking: Reported on 10/6/2024      Facility-Administered Medications: None       The list below reflects the updated allergy list. Please review each documented allergy for additional clarification and justification.  Allergies  Reviewed by Wilner Rodrigez, [place] on 2/20/2025        Severity Reactions Comments    Coconut High Anaphylaxis, Swelling Throat Swelling    Banana Not Specified Hives     Hydrocodone-acetaminophen Not Specified Other, Hives tolerated 09/12 without reaction            Patient accepts M2B at discharge. Pharmacy has been updated to [facility].    Sources  [facility]  Pharmacy dispense history  Patient interview Good historian     Additional Comments  Patient reports no maintenance prescription or OTC medications     Wilner Rodrigez, PharmD  [facility]  [id number]  [facility], Room# [address]  [place] [address]  Phone: [phone]  Please reach out via Secure Chat for questions, or if no response call -[employer] [place] [employer] [place] or [employer]NOW

## 2025-02-20 NOTE — PROGRESS NOTES
02/20/25 1253   Discharge Planning   Living Arrangements Alone   Support Systems Friends/neighbors   Assistance Needed independent with ADL's   Type of Residence Private residence   Home or Post Acute Services None   Expected Discharge Disposition Home   Does the patient need discharge transport arranged? Yes   RoundTrip coordination needed? Yes   Financial Resource Strain   How hard is it for you to pay for the very basics like food, housing, medical care, and heating? Not very   Housing Stability   In the last 12 months, was there a time when you were not able to pay the mortgage or rent on time? N   In the past 12 months, how many times have you moved where you were living? 0   At any time in the past 12 months, were you homeless or living in a shelter (including now)? N   Transportation Needs   In the past 12 months, has lack of transportation kept you from medical appointments or from getting medications? no   In the past 12 months, has lack of transportation kept you from meetings, work, or from getting things needed for daily living? No       Plan per Medical/Surgical team: Patient was admitted for right flank pain and decreased ostomy output. CT AP ordered. Started on IV ATB.    Assessment Note: Patient stated he lives alone. He is up and independent. Denies any financial or social work needs. Will possibly need transportation assistance at discharge.    Home Care: denies  PCP: needs to establish care  Pharmacy: [facility]  DME: denies  Falls: denies  Dialysis: denies    Potential Barriers: none  Discharge Disposition: home  ADOD: 1-2 days    Lorena Wade RN, BSN  Transitional Care Coordinator

## 2025-02-20 NOTE — DISCHARGE INSTRUCTIONS
Discharge Instructions for Ingrid Maurice    What We Found:    You came in with nausea, vomiting, and flank pain. Your labs showed some dehydration and acid buildup, likely from not eating or drinking enough. You looked very dry on exam. Your nausea and vomiting are much better with Zofran, and your labs are stable now after receiving a lot of fluids. We don’t think you have an infection, so we stopped the antibiotics (Zosyn). Your pain is being managed with lidocaine and tylenol.    What We Did:    - Gave you 1L liter of IV fluids over 2 hours on top of what you received in the ED.  - Used bicarbonate (bicarb) to help with the acid buildup (now finished).    - Gave you Zofran to stop the nausea and vomiting.      What to Do at Home:    1. Take Zofran: Use it as prescribed to keep nausea away.    2. Drink Plenty: Sip water or clear fluids to stay hydrated.      3. Follow Up:       - See your primary care doctor (PCP) soon.       - We made a referral with a GI doctor to figure out what’s causing your nausea/vomiting. Make sure to make an appointment.    When to Come Back:    Call or return if you have worsening nausea, vomiting, pain, fever, chills, or trouble staying hydrated.

## 2025-02-20 NOTE — PROGRESS NOTES
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    Presented 2025 with flank pain, abdominal cramps, nausea, and vomiting. Noticed worsening right flank pain, decreased urine and colostomy output, followed by progressive nausea and ~3 daily NBNB emesis episodes since 2025. Reports similar symptoms with past UTIs but denies dysuria or hematuria.     Flank pain constant at 9/10, now improved; chronic abdominal cramps unchanged. Tolerating PO intake despite vomiting and dry heaves, unrelated to eating/drinking. Endorses occasional chills and myalgias. Outpatient labs showed elevated creatinine, prompting ED visit per physician.    ROS otherwise negative: Denies headaches, dizziness, fever, URI symptoms, chest pain, shortness of breath, gross hematuria, leg swelling, melena, hematochezia, or colostomy output changes.     Subjective    This mornign when I saw him he was in his bed Djing making music, pain was well controlled but tender on palpation. Colostomy in in RLQ place, stoma was pinkish red, and moist, no visible leakage, stool was brown.  Rest of physical exam WNL.    Objective    Temp:  [36.2 °C (97.2 °F)-36.6 °C (97.9 °F)] 36.6 °C (97.9 °F)  Heart Rate:  [] 93  Resp:  [16-18] 16  BP: (120-136)/(74-99) 136/99       Intake/Output Summary (Last 24 hours) at 2025 0754  Last data filed at 2025 2306  Gross per 24 hour   Intake 891.67 ml   Output --   Net 891.67 ml        Physical Exam  GEN: NAD.   Eyes: PERRL.   Mouth: MMM.  CVS: RRR, no murmurs, rubs, or gallops. No JVD. Negative HJR.  Resp: CTA b/l.  Abd: Flat. ND. NT. NL BS. Soft.  MSK: No edema.  Skin: WWP. NL capillary  refill.  Neuro: NFD. A&Ox4. 5/5 Power t/o. CN intact.       LABS:  CBC RFP   Lab Results   Component Value Date    WBC 9.7 02/20/2025    HGB 14.6 02/20/2025    HCT 42.3 02/20/2025    MCV 82 02/20/2025     02/20/2025    NEUTROABS 4.49 02/20/2025    Lab Results   Component Value Date     (L) 02/19/2025    K 4.9 02/19/2025     02/19/2025    CO2 16 (L) 02/19/2025    BUN 27 (H) 02/19/2025    CREATININE 2.14 (H) 02/19/2025    CREATININE 1.31 (H) 02/06/2025     Lab Results   Component Value Date    MG 2.62 (H) 02/19/2025    PHOS 2.9 10/07/2024    CALCIUM 10.1 02/19/2025         Hepatic Function ABG/VBG   Lab Results   Component Value Date    ALT 16 02/19/2025    AST 16 02/19/2025    ALKPHOS 95 02/19/2025     Lab Results   Component Value Date    BILIDIR 0.2 06/18/2022      Lab Results   Component Value Date    PROTIME 12.0 02/19/2025    APTT 25 (L) 02/19/2025    INR 1.1 02/19/2025    Lab Results   Component Value Date    LACTATE 1.6 10/06/2024        Micro:    Imaging:  IMPRESSION:  1. No CT evidence of acute intra-abdominal findings.  2. Stable postoperative changes and additional chronic findings as  described above.    Current medications:  Scheduled Meds:  ondansetron, 4 mg, intravenous, Once  piperacillin-tazobactam, 3.375 g, intravenous, q6h  sodium bicarbonate, 650 mg, oral, TID      Continuous Infusions:   PRN Meds:PRN medications: acetaminophen, dicyclomine, ondansetron, oxyCODONE    Assessment      Assesment:   This is a patient presenting with elevated lactate and acidosis likely 2/2 to poor PO intake. Was very dry on exam. Will need to follow outpatient ith GI to see what is triggering these nausea/vomiting spells. Zofran inpatient controlling n/v very well. Will discharge with some.    His labs came back stable so we will discontinued bicarb, and discontinued zosyn as we do not suspect sepsis or UTI. Adde dlidocaine for pain. Overall patient is stable. Will follow afternoon labs to see if Cr  improving, if so, can doscharge patient    Plan   - 1L LR bolus over 2 hr  - Completed bicarb   - Stopped zosyn   - Zofran for nausea     Outpatient  - PCP   - GI regeral   - Zofran on discharge      #Lactic Acidosis 2/2 dehydration  #Pyuria  #Flank Pain   ::Patient presents with 1 week of flank pain, abdominal cramps, N/V and decreased ostomy/urine output, reports similar symptoms to prior UTIs though without dysuria   ::WBC 15.6 on arrival  ::Lactate 4.5 > repeat  pending  ::Bicarb 16 on admit   ::CTAP w/o Contrast without acute process, no intraabdominal   ::Ucx from 10/2024 growing MDR ESBL producing E.coli  Plan:  - follow repeat labs  - Follow Bcx/Ucx  - Continue Zosyn (2/19 - xx) to cover for sepsis, transition to orals pending sensitivities   ---> June last year had E coli with multi drug resistence  - 30cc/kg IVF  - Bicarb tabs 650 TID (discontinue when improved   - Tylenol for pain     #Nausea/Vomiting/Abdominal Cramps  ::Does not appear obstruction and has benign abdominal exam, favor systemic manifestation   ::CTAP without acute intraabdominal process  :: Lipase 25  PLAN:  - Zofran PRN   - Bentyl PRN      #FABRICIO, likely prerenal iso n/vomiting  #Hyponatremia, likely hypovolemic   #s/p R Nephrectomy (2018)  ::Cr 2.14, BL 1.2-1.3  ::Na 128 on admit, similarly low on prior admission for similar complaints   - CTM labs with IVF, favor prerenal/hypovolemia given history of N/V  - Avoid nephrotoxins and hypotension, renally dose medications      #Bipolar disorder  ::Per chart, not on home medications      #Faust syndrome s/p proctocolectomy and end ileostomy and J pouch  #Ostomy   ::Currently with decreased output      F : 2L so far for 30cc/kg, PRN   E: replete lytes prn, K>4, Mg >2  N: Regular  A: PIV     DVT prophylaxis: Ambulation   GI prophylaxis: not indicated     Code Status: Full (discussed with patient at bedside upon admission)   EC: [facility] (Girlfriend) - [phone]         Electronically  signed by Marry Cross MD on 02/20/25 at 7:54 AM

## 2025-02-20 NOTE — CARE PLAN
The patient's goals for the shift include      The clinical goals for the shift include Pt will remain safe and free of falls during shift    Problem: Pain - Adult  Goal: Verbalizes/displays adequate comfort level or baseline comfort level  Outcome: Progressing     Problem: Safety - Adult  Goal: Free from fall injury  Outcome: Progressing     Problem: Discharge Planning  Goal: Discharge to home or other facility with appropriate resources  Outcome: Progressing

## 2025-02-20 NOTE — H&P
History Of Present Illness  Ingrid Maurice is a 34 y.o. male with PMHx of Faust syndrome s/p proctocolectomy and end ileostomy and J pouch, hydronephrosis s/p R nephrectomy (2018), mesenteric desmoid tumor, abdominal fibromatosis, and bipolar disorder who presented 2/19/2025 with a chief complaint of flank pain, abdominal cramps and N/V. The patient states he initially noticed worsening right flank pain as well as decrease in urine production and colostomy output followed by progressive nausea and ~3 episodes of NBNB emesis daily starting 2/14/2025. He states he has similar symptoms whenever he has a UTI but denies any dysuria or hematuria. His flank pain is rated at constant 9/10 but is currently resolved and he has abdominal cramps which are chronic for many years and unchanged. He has been tolerating PO intake during this time but but still has vomiting and dry heaves which do not appear to correlate with eating or drinking. He denies headaches, dizziness, fever, URI symptoms, chest pain, shortness of breath, gross hematuria, leg swelling, melena, hematochezia, or change in the consistency of his colostomy output. He does however endorse occasional chills and myalgias. He states he was told by his doctor to come in as outpatient labs showed elevated Creatinine.    Notably patient has multiple ED visits for similar symptoms, previously requiring MICU for bicarb gtt 2/2 acidemia in 6/2024. He was last here for this in 10/2024 when he left AMA from the ED     ROS: Otherwise negative    ED Course:  T 97.2, /74, , RR18, 90% on RA     Labs   Latest Reference Range & Units 02/19/25 14:05   WBC 4.4 - 11.3 x10*3/uL 15.4 (H)   nRBC 0.0 - 0.0 /100 WBCs 0.0   RBC 4.50 - 5.90 x10*6/uL 6.88 (H)   HEMOGLOBIN 13.5 - 17.5 g/dL 19.6 (H)   HEMATOCRIT 41.0 - 52.0 % 55.0 (H)   MCV 80 - 100 fL 80   MCH 26.0 - 34.0 pg 28.5   MCHC 32.0 - 36.0 g/dL 35.6   RED CELL DISTRIBUTION WIDTH 11.5 - 14.5 % 12.6   Platelets 150 -  450 x10*3/uL 394     Results from last 7 days   Lab Units 02/19/25  1742   SODIUM mmol/L 128*   POTASSIUM mmol/L 4.9   CHLORIDE mmol/L 100   CO2 mmol/L 16*   BUN mg/dL 27*   CREATININE mg/dL 2.14*   CALCIUM mg/dL 10.1      Results from last 7 days   Lab Units 02/19/25  1742   ALK PHOS U/L 95   BILIRUBIN TOTAL mg/dL 0.8   PROTEIN TOTAL g/dL 9.3*   ALT U/L 16   AST U/L 16      Results from last 7 days   Lab Units 02/19/25  1405   APTT seconds 25*   INR  1.1      FLU A/B negative  COVID negative  VBG 7.21 CO2 37, HCO3 14.8, Lactate 4.5    EKG: Sinus tachycardia, no ischemic changes     UA: Pyuria with + Leuks, no Nitrites     CTAP w/o Contrast: No acute intra-abdominal findings     ED Interventions:  - Patient had Bcx/Ucx drawn, given Morphine x2, 1L LR, Zosyn x1 and admitted for further management   - Follow up with Keyur Smith and Amandeep for ongoing    Past Medical History  - As above     Surgical History  He has a past surgical history that includes Other surgical history (10/14/2013); Abdominal adhesion surgery (10/14/2013); Other surgical history (10/14/2013); Other surgical history (10/14/2013); Exploratory laparotomy (10/14/2013); Other surgical history (10/14/2013); IR nephrostomy tube exchange (1/8/2013); IR GI tube exchange (1/8/2013); IR nephrostomy tube exchange (3/13/2013); IR GI tube exchange (3/13/2013); IR nephrostomy tube exchange (4/12/2013); IR GI tube exchange (4/12/2013); IR nephrostomy tube exchange (6/19/2013); IR GI tube exchange (6/19/2013); IR nephrostomy tube exchange (7/24/2013); IR GI tube exchange (7/24/2013); IR nephrostomy tube exchange (11/25/2013); IR GI tube exchange (11/25/2013); IR nephrostomy tube exchange (3/6/2014); IR GI tube exchange (3/6/2014); IR nephrostomy tube exchange (5/2/2014); IR GI tube exchange (5/2/2014); IR nephrostomy tube exchange (6/9/2014); IR GI tube exchange (6/9/2014); IR nephrostomy tube exchange (7/14/2014); IR GI tube exchange (7/14/2014); IR nephrostomy  tube exchange (8/18/2014); IR GI tube exchange (8/18/2014); IR nephrostomy tube exchange (10/7/2014); IR GI tube exchange (10/7/2014); IR nephrostomy tube exchange (11/30/2014); IR GI tube exchange (11/30/2014); IR nephrostomy tube exchange (12/30/2014); IR GI tube exchange (12/30/2014); US guided percutaneous peritoneal or retroperitoneal fluid collection drainage (1/2/2015); IR nephrostomy tube exchange (4/8/2015); IR GI tube exchange (4/8/2015); IR nephrostomy tube exchange (6/5/2015); IR GI tube exchange (6/5/2015); IR nephrostomy tube exchange (8/3/2015); IR GI tube exchange (8/3/2015); IR nephrostomy tube exchange (10/1/2015); IR nephrostomy tube exchange (11/25/2015); IR GI tube exchange (11/25/2015); IR nephrostomy tube exchange (12/28/2015); IR GI tube exchange (12/28/2015); IR nephrostomy tube exchange (1/28/2016); IR nephrostomy tube exchange (1/10/2018); IR nephrostomy tube exchange (4/30/2018); IR nephrostomy tube exchange (5/22/2018); IR nephrostomy tube exchange (1/31/2017); IR nephrostomy tube exchange (3/15/2017); IR nephrostomy tube exchange (4/19/2017); IR nephrostomy tube exchange (5/18/2017); IR nephrostomy tube exchange (6/29/2017); IR nephrostomy tube exchange (8/17/2017); IR nephrostomy tube exchange (10/13/2017); CT guided percutaneous peritoneal or retroperitoneal fluid collection drainage (7/3/2018); IR nephrostomy tube exchange (3/21/2012); IR GI tube exchange (3/21/2012); IR nephrostomy tube exchange (5/24/2012); IR GI tube exchange (5/24/2012); IR nephrostomy tube exchange (8/7/2012); IR GI tube exchange (8/7/2012); IR nephrostomy tube exchange (9/5/2012); IR GI tube exchange (9/5/2012); IR nephrostomy tube exchange (11/5/2012); and IR GI tube exchange (11/5/2012).     Social History  He reports that he has never smoked. He has never used smokeless tobacco. He reports current drug use. Drug: Marijuana. No history on file for alcohol use. Lives at home with girlfriend     Family  History  No family history on file.     Allergies  Coconut, Banana, and Hydrocodone-acetaminophen     Physical Exam  General:  alert, conversant, in no apparent distress  HEENT: normocephalic, atraumatic, EOMI, no scleral icterus  CV: RRR, no murmurs  Pulm: CTAB, no wheezes or crackles, no increased work of breathing  Abd: soft, tender to palpation in all quadrants, non distended, stoma in place with dark loose output. BS+. Eating dinner during interview   : no combs   Ext: warm, no lower extremity edema  Skin: dry with poor turgor   Neuro: A&Ox3  all extremities  Psych: normal affect      Last Recorded Vitals  /80   Pulse 80   Temp 36.2 °C (97.2 °F)   Resp 16   Wt 65 kg (143 lb 4.8 oz)   SpO2 99%     Medications  Scheduled medications  dextrose 5 % and lactated Ringer's, 1,000 mL, intravenous, Once  ondansetron, 4 mg, intravenous, Once  piperacillin-tazobactam, 3.375 g, intravenous, q6h  sodium bicarbonate, 650 mg, oral, TID      Continuous medications     PRN medications  PRN medications: acetaminophen, dicyclomine, ondansetron     Assessment/Plan   Ingrid Maurice is a 34 y.o. male with PMHx of Faust syndrome s/p proctocolectomy and end ileostomy and J pouch, hydronephrosis s/p R nephrectomy (2018), mesenteric desmoid tumor, abdominal fibromatosis, and bipolar disorder who presented 2/19/2025 with a chief complaint of flank pain, abdominal cramps, N/V and decreased ostomy/urine output. On arrival he is tachycardic but AF. Labs notable for hyponatremia 128, FABRICIO with Cr 2.14, metabolic acidosis with Bicarb 16 (pH 7.21,) likely 2/2 lactate 4.5, leukocytosis 15.4, and infected appearing UA. CTAP with no acute intraabdominal process. At this time favor sepsis 2/2 UTI with multiple complications from intravascular depletion aggravated by systemic manifestations such as N/V.     #Sepsis (Tachycardia/Leukocytosis 2/4)  #Lactic Acidosis   #Pyuria, possible UTI   #Flank Pain   ::Patient presents with 1  week of flank pain, abdominal cramps, N/V and decreased ostomy/urine output, reports similar symptoms to prior UTIs though without dysuria   ::WBC 15.6 on arrival  ::Lactate 4.5 > repeat  pending  ::Bicarb 16 on admit   ::CTAP w/o Contrast without acute process, no intraabdominal   ::Ucx from 10/2024 growing MDR ESBL producing E.coli  Plan:  - Follow Bcx/Ucx  - Continue Zosyn (2/19 - xx) to cover for sepsis, transition to orals pending sensitivities   - 30cc/kg IVF  - Bicarb tabs 650 TID (discontinue when improved   - Tylenol for pain    #Nausea/Vomiting/Abdominal Cramps  ::Does not appear obstruction and has benign abdominal exam, favor systemic manifestation   ::CTAP without acute intraabdominal process  - Update Lipase   - Zofran PRN   - Bentyl PRN     #FABRICIO, likely prerenal   #Hyponatremia, likely hypovolemic   #s/p R Nephrectomy (2018)  ::Cr 2.14, BL 1.2-1.3  ::Na 128 on admit, similarly low on prior admission for similar complaints   - CTM labs with IVF, favor prerenal/hypovolemia given history of N/V  - Avoid nephrotoxins and hypotension, renally dose medications      #Bipolar disorder  ::Per chart, not on home medications      #Faust syndrome s/p proctocolectomy and end ileostomy and J pouch  #Ostomy   ::Currently with decreased output     F : 2L so far for 30cc/kg, PRN   E: replete lytes prn, K>4, Mg >2  N: Regular  A: PIV    DVT prophylaxis: Ambulation   GI prophylaxis: not indicated    Code Status: Full (discussed with patient at bedside upon admission)   EC: [facility] (Girlfriend) - [phone]     Stanley Gupta MD  Internal Medicine

## 2025-02-21 ENCOUNTER — PATIENT OUTREACH (OUTPATIENT)
Dept: CARE COORDINATION | Facility: CLINIC | Age: 35
End: 2025-02-21
Payer: COMMERCIAL

## 2025-02-21 LAB — BACTERIA UR CULT: ABNORMAL

## 2025-02-21 NOTE — PROGRESS NOTES
Discharge facility: [facility]  Discharge diagnosis: Sepsis, due to unspecified organism, unspecified whether acute organ dysfunction present   Admission date: 2/19/25  Discharge date: 2/20/25  PCP Appointment Date: Needs scheduled     Hospital Encounter and Summary: Linked     Outreach call to patient to support a smooth transition of care from recent admission. Left voicemail message for patient with my contact information. Will continue to follow through transition period.    Sola Hightower RN, [facility] [place]  Phone [phone]

## 2025-02-22 NOTE — DISCHARGE SUMMARY
Discharge Diagnosis  Sepsis, due to unspecified organism, unspecified whether acute organ dysfunction present (Multi)    Issues Requiring Follow-Up  GI referral for recurrent nausea vomiting     Discharge Meds     Medication List      START taking these medications     dicyclomine 10 mg capsule; Commonly known as: Bentyl; Take 1 capsule (10   mg) by mouth 4 times a day as needed (abdominal pain or cramps).   lidocaine 4 % patch; Place 1 patch over 12 hours on the skin once daily.   Remove & discard patch within 12 hours or as directed by MD.   ondansetron ODT 4 mg disintegrating tablet; Commonly known as:   Zofran-ODT; Dissolve 1 tablet (4 mg) in the mouth every 8 hours if needed   for nausea or vomiting.       Test Results Pending At Discharge  Pending Labs       Order Current Status    Blood Culture Preliminary result    Blood Culture Preliminary result            Hospital Course  Conynttrent DEISI Maurice, 34-year-old male with Faust syndrome (s/p proctocolectomy, end ileostomy, J pouch), hydronephrosis (s/p R nephrectomy 2018), mesenteric desmoid tumor, abdominal fibromatosis, and bipolar disorder.     Presented 2/19/2025 with flank pain, abdominal cramps, nausea, and vomiting. Noticed worsening right flank pain, decreased urine and colostomy output, followed by progressive nausea and ~3 daily NBNB emesis episodes since 2/14/2025.     Flank pain constant at 9/10, now improved; chronic abdominal cramps unchanged. Tolerating PO intake despite vomiting and dry heaves, unrelated to eating/drinking. Endorses occasional chills and myalgias. Outpatient labs showed elevated creatinine, prompting ED visit per physician. ROS otherwise negative: Denies headaches, dizziness, fever, URI symptoms, chest pain, shortness of breath, gross hematuria, leg swelling, melena, hematochezia, or colostomy output changes.     This mornign when I saw him he was in his bed Djing making music, pain was well controlled but tender on palpation.  Colostomy in in RLQ place, stoma was pinkish red, and moist, no visible leakage, stool was brown. Rest of physical exam WNL.     Assesment:   This is a patient presenting with elevated lactate and acidosis likely 2/2 to poor PO intake. Was very dry on exam. Will need to follow outpatient ith GI to see what is triggering these nausea/vomiting spells. Zofran inpatient controlling n/v very well. Will discharge with some.    His labs came back stable so we will discontinued bicarb, and discontinued zosyn as we do not suspect sepsis or UTI. Adde dlidocaine for pain. Overall patient is stable. Will follow afternoon labs to see if Cr improving, if so, can doscharge patient    Plan   - 1L LR bolus over 2 hr  - Completed bicarb   - Stopped zosyn   - Zofran for nausea     Outpatient  - PCP   - GI regeral   - Zofran on discharge    Pertinent Physical Exam At Time of Discharge  GEN: NAD.   Eyes: PERRL.   Mouth: MMM.  CVS: RRR, no murmurs, rubs, or gallops. No JVD. Negative HJR.  Resp: CTA b/l.  Abd: Ostomy at RLQ, pink, stool brown, no leakage. Flat. ND. NT. NL BS. Soft.  MSK: No edema.  Skin: WWP. NL capillary refill.  Neuro: NFD. A&Ox4. 5/5 Power t/o. CN intact.        Outpatient Follow-Up  No future appointments.      Marry Cross MD

## 2025-02-23 LAB
BACTERIA BLD CULT: NORMAL
BACTERIA BLD CULT: NORMAL

## 2025-03-07 ENCOUNTER — PATIENT OUTREACH (OUTPATIENT)
Dept: CARE COORDINATION | Facility: CLINIC | Age: 35
End: 2025-03-07
Payer: COMMERCIAL

## 2025-03-07 ENCOUNTER — CLINICAL SUPPORT (OUTPATIENT)
Dept: EMERGENCY MEDICINE | Facility: HOSPITAL | Age: 35
End: 2025-03-07
Payer: COMMERCIAL

## 2025-03-07 ENCOUNTER — APPOINTMENT (OUTPATIENT)
Dept: RADIOLOGY | Facility: HOSPITAL | Age: 35
End: 2025-03-07
Payer: COMMERCIAL

## 2025-03-07 ENCOUNTER — HOSPITAL ENCOUNTER (INPATIENT)
Facility: HOSPITAL | Age: 35
LOS: 1 days | Discharge: LEFT BEFORE DISCHARGE | End: 2025-03-07
Attending: EMERGENCY MEDICINE | Admitting: STUDENT IN AN ORGANIZED HEALTH CARE EDUCATION/TRAINING PROGRAM
Payer: COMMERCIAL

## 2025-03-07 VITALS
HEART RATE: 101 BPM | DIASTOLIC BLOOD PRESSURE: 86 MMHG | BODY MASS INDEX: 22.73 KG/M2 | RESPIRATION RATE: 18 BRPM | HEIGHT: 68 IN | OXYGEN SATURATION: 98 % | SYSTOLIC BLOOD PRESSURE: 118 MMHG | TEMPERATURE: 97.9 F | WEIGHT: 150 LBS

## 2025-03-07 DIAGNOSIS — R11.2 NAUSEA AND VOMITING, UNSPECIFIED VOMITING TYPE: Primary | ICD-10-CM

## 2025-03-07 DIAGNOSIS — N17.9 AKI (ACUTE KIDNEY INJURY) (CMS-HCC): ICD-10-CM

## 2025-03-07 DIAGNOSIS — E86.0 DEHYDRATION: ICD-10-CM

## 2025-03-07 LAB
ALBUMIN SERPL BCP-MCNC: 5.5 G/DL (ref 3.4–5)
ALP SERPL-CCNC: 105 U/L (ref 33–120)
ALT SERPL W P-5'-P-CCNC: 18 U/L (ref 10–52)
ANION GAP BLDV CALCULATED.4IONS-SCNC: 15 MMOL/L (ref 10–25)
ANION GAP SERPL CALC-SCNC: 21 MMOL/L (ref 10–20)
AST SERPL W P-5'-P-CCNC: 24 U/L (ref 9–39)
ATRIAL RATE: 130 BPM
BASE EXCESS BLDV CALC-SCNC: -5.3 MMOL/L (ref -2–3)
BASOPHILS # BLD AUTO: 0.04 X10*3/UL (ref 0–0.1)
BASOPHILS NFR BLD AUTO: 0.3 %
BILIRUB SERPL-MCNC: 1 MG/DL (ref 0–1.2)
BODY TEMPERATURE: 37 DEGREES CELSIUS
BUN SERPL-MCNC: 54 MG/DL (ref 6–23)
CA-I BLDV-SCNC: 1.12 MMOL/L (ref 1.1–1.33)
CALCIUM SERPL-MCNC: 10.4 MG/DL (ref 8.6–10.6)
CARDIAC TROPONIN I PNL SERPL HS: <3 NG/L (ref 0–53)
CHLORIDE BLDV-SCNC: 92 MMOL/L (ref 98–107)
CHLORIDE SERPL-SCNC: 89 MMOL/L (ref 98–107)
CO2 SERPL-SCNC: 20 MMOL/L (ref 21–32)
CREAT SERPL-MCNC: 3.09 MG/DL (ref 0.5–1.3)
EGFRCR SERPLBLD CKD-EPI 2021: 26 ML/MIN/1.73M*2
EOSINOPHIL # BLD AUTO: 0 X10*3/UL (ref 0–0.7)
EOSINOPHIL NFR BLD AUTO: 0 %
ERYTHROCYTE [DISTWIDTH] IN BLOOD BY AUTOMATED COUNT: 13.2 % (ref 11.5–14.5)
ETHANOL SERPL-MCNC: <10 MG/DL
GLUCOSE BLDV-MCNC: 123 MG/DL (ref 74–99)
GLUCOSE SERPL-MCNC: 95 MG/DL (ref 74–99)
HCO3 BLDV-SCNC: 19.3 MMOL/L (ref 22–26)
HCT VFR BLD AUTO: 53.4 % (ref 41–52)
HCT VFR BLD EST: 60 % (ref 41–52)
HGB BLD-MCNC: 19.9 G/DL (ref 13.5–17.5)
HGB BLDV-MCNC: 20 G/DL (ref 13.5–17.5)
HOLD SPECIMEN: NORMAL
IMM GRANULOCYTES # BLD AUTO: 0.05 X10*3/UL (ref 0–0.7)
IMM GRANULOCYTES NFR BLD AUTO: 0.4 % (ref 0–0.9)
LACTATE BLDV-SCNC: 2.6 MMOL/L (ref 0.4–2)
LIPASE SERPL-CCNC: 76 U/L (ref 9–82)
LYMPHOCYTES # BLD AUTO: 2.77 X10*3/UL (ref 1.2–4.8)
LYMPHOCYTES NFR BLD AUTO: 19.9 %
MAGNESIUM SERPL-MCNC: 3 MG/DL (ref 1.6–2.4)
MCH RBC QN AUTO: 29.1 PG (ref 26–34)
MCHC RBC AUTO-ENTMCNC: 37.3 G/DL (ref 32–36)
MCV RBC AUTO: 78 FL (ref 80–100)
MONOCYTES # BLD AUTO: 1.25 X10*3/UL (ref 0.1–1)
MONOCYTES NFR BLD AUTO: 9 %
NEUTROPHILS # BLD AUTO: 9.79 X10*3/UL (ref 1.2–7.7)
NEUTROPHILS NFR BLD AUTO: 70.4 %
NRBC BLD-RTO: 0 /100 WBCS (ref 0–0)
OXYHGB MFR BLDV: 39 % (ref 45–75)
P AXIS: 78 DEGREES
P OFFSET: 208 MS
P ONSET: 164 MS
PCO2 BLDV: 35 MM HG (ref 41–51)
PH BLDV: 7.35 PH (ref 7.33–7.43)
PLATELET # BLD AUTO: 542 X10*3/UL (ref 150–450)
PO2 BLDV: 27 MM HG (ref 35–45)
POTASSIUM BLDV-SCNC: 5.9 MMOL/L (ref 3.5–5.3)
POTASSIUM SERPL-SCNC: 5.3 MMOL/L (ref 3.5–5.3)
PR INTERVAL: 124 MS
PROT SERPL-MCNC: 10.6 G/DL (ref 6.4–8.2)
Q ONSET: 226 MS
QRS COUNT: 22 BEATS
QRS DURATION: 80 MS
QT INTERVAL: 280 MS
QTC CALCULATION(BAZETT): 412 MS
QTC FREDERICIA: 362 MS
R AXIS: 88 DEGREES
RBC # BLD AUTO: 6.84 X10*6/UL (ref 4.5–5.9)
SAO2 % BLDV: 39 % (ref 45–75)
SODIUM BLDV-SCNC: 120 MMOL/L (ref 136–145)
SODIUM SERPL-SCNC: 125 MMOL/L (ref 136–145)
T AXIS: 63 DEGREES
T OFFSET: 366 MS
VENTRICULAR RATE: 130 BPM
WBC # BLD AUTO: 13.9 X10*3/UL (ref 4.4–11.3)

## 2025-03-07 PROCEDURE — 83690 ASSAY OF LIPASE: CPT

## 2025-03-07 PROCEDURE — 2500000004 HC RX 250 GENERAL PHARMACY W/ HCPCS (ALT 636 FOR OP/ED): Mod: SE

## 2025-03-07 PROCEDURE — 83735 ASSAY OF MAGNESIUM: CPT

## 2025-03-07 PROCEDURE — 99285 EMERGENCY DEPT VISIT HI MDM: CPT | Mod: 25 | Performed by: EMERGENCY MEDICINE

## 2025-03-07 PROCEDURE — 93005 ELECTROCARDIOGRAM TRACING: CPT

## 2025-03-07 PROCEDURE — 96374 THER/PROPH/DIAG INJ IV PUSH: CPT

## 2025-03-07 PROCEDURE — 71046 X-RAY EXAM CHEST 2 VIEWS: CPT

## 2025-03-07 PROCEDURE — 84484 ASSAY OF TROPONIN QUANT: CPT

## 2025-03-07 PROCEDURE — 82077 ASSAY SPEC XCP UR&BREATH IA: CPT

## 2025-03-07 PROCEDURE — G0378 HOSPITAL OBSERVATION PER HR: HCPCS

## 2025-03-07 PROCEDURE — 99223 1ST HOSP IP/OBS HIGH 75: CPT

## 2025-03-07 PROCEDURE — 84132 ASSAY OF SERUM POTASSIUM: CPT

## 2025-03-07 PROCEDURE — 36415 COLL VENOUS BLD VENIPUNCTURE: CPT

## 2025-03-07 PROCEDURE — 1210000001 HC SEMI-PRIVATE ROOM DAILY

## 2025-03-07 PROCEDURE — 85025 COMPLETE CBC W/AUTO DIFF WBC: CPT

## 2025-03-07 PROCEDURE — 96361 HYDRATE IV INFUSION ADD-ON: CPT

## 2025-03-07 PROCEDURE — 96375 TX/PRO/DX INJ NEW DRUG ADDON: CPT

## 2025-03-07 RX ORDER — SODIUM CHLORIDE 9 MG/ML
75 INJECTION, SOLUTION INTRAVENOUS CONTINUOUS
Status: DISCONTINUED | OUTPATIENT
Start: 2025-03-07 | End: 2025-03-08 | Stop reason: HOSPADM

## 2025-03-07 RX ORDER — CAPSAICIN 0.75 MG/G
CREAM TOPICAL EVERY 12 HOURS PRN
Status: DISCONTINUED | OUTPATIENT
Start: 2025-03-07 | End: 2025-03-08 | Stop reason: HOSPADM

## 2025-03-07 RX ORDER — DICYCLOMINE HYDROCHLORIDE 10 MG/1
10 CAPSULE ORAL 4 TIMES DAILY PRN
Status: DISCONTINUED | OUTPATIENT
Start: 2025-03-07 | End: 2025-03-08 | Stop reason: HOSPADM

## 2025-03-07 RX ORDER — SODIUM CHLORIDE 9 MG/ML
125 INJECTION, SOLUTION INTRAVENOUS CONTINUOUS
Status: DISCONTINUED | OUTPATIENT
Start: 2025-03-07 | End: 2025-03-07 | Stop reason: HOSPADM

## 2025-03-07 RX ORDER — MORPHINE SULFATE 4 MG/ML
4 INJECTION INTRAVENOUS ONCE
Status: COMPLETED | OUTPATIENT
Start: 2025-03-07 | End: 2025-03-07

## 2025-03-07 RX ORDER — ONDANSETRON 4 MG/1
4 TABLET, ORALLY DISINTEGRATING ORAL EVERY 8 HOURS PRN
Status: DISCONTINUED | OUTPATIENT
Start: 2025-03-07 | End: 2025-03-08 | Stop reason: HOSPADM

## 2025-03-07 RX ORDER — LIDOCAINE 560 MG/1
1 PATCH PERCUTANEOUS; TOPICAL; TRANSDERMAL DAILY
Status: DISCONTINUED | OUTPATIENT
Start: 2025-03-07 | End: 2025-03-08 | Stop reason: HOSPADM

## 2025-03-07 RX ORDER — CAPSAICIN 0 G/G
CREAM TOPICAL 2 TIMES DAILY
Status: DISCONTINUED | OUTPATIENT
Start: 2025-03-07 | End: 2025-03-07

## 2025-03-07 RX ORDER — ONDANSETRON HYDROCHLORIDE 2 MG/ML
4 INJECTION, SOLUTION INTRAVENOUS EVERY 8 HOURS PRN
Status: DISCONTINUED | OUTPATIENT
Start: 2025-03-07 | End: 2025-03-08 | Stop reason: HOSPADM

## 2025-03-07 RX ORDER — DROPERIDOL 2.5 MG/ML
1.25 INJECTION, SOLUTION INTRAMUSCULAR; INTRAVENOUS ONCE
Status: COMPLETED | OUTPATIENT
Start: 2025-03-07 | End: 2025-03-07

## 2025-03-07 RX ADMIN — SODIUM CHLORIDE 75 ML/HR: 9 INJECTION, SOLUTION INTRAVENOUS at 11:53

## 2025-03-07 RX ADMIN — SODIUM CHLORIDE, POTASSIUM CHLORIDE, SODIUM LACTATE AND CALCIUM CHLORIDE 1000 ML: 600; 310; 30; 20 INJECTION, SOLUTION INTRAVENOUS at 09:49

## 2025-03-07 RX ADMIN — MORPHINE SULFATE 4 MG: 4 INJECTION, SOLUTION INTRAMUSCULAR; INTRAVENOUS at 09:49

## 2025-03-07 RX ADMIN — SODIUM CHLORIDE 125 ML/HR: 9 INJECTION, SOLUTION INTRAVENOUS at 15:25

## 2025-03-07 RX ADMIN — DROPERIDOL 1.25 MG: 2.5 INJECTION, SOLUTION INTRAMUSCULAR; INTRAVENOUS at 12:28

## 2025-03-07 RX ADMIN — ONDANSETRON 4 MG: 2 INJECTION INTRAMUSCULAR; INTRAVENOUS at 09:49

## 2025-03-07 ASSESSMENT — PAIN SCALES - GENERAL
PAINLEVEL_OUTOF10: 6
PAINLEVEL_OUTOF10: 0 - NO PAIN

## 2025-03-07 ASSESSMENT — PAIN - FUNCTIONAL ASSESSMENT: PAIN_FUNCTIONAL_ASSESSMENT: 0-10

## 2025-03-07 NOTE — H&P
"HPI:  Ingrid Maurice is a 34-year-old male with a history of Faust syndrome (S/P proctocolectomy, end ileostomy, and J pouch), right nephrectomy, mesenteric desmoid tumor, abdominal fibromatosis, and bipolar disorder presented to the emergency department with complaints of abdominal pain, cramps, nausea, and vomiting. The abdominal pain was periumbilical, non-radiating, and colicky, accompanied by nausea and vomiting. No change in ostomy output. The patient reports that the pain is neither exacerbated nor alleviated by any measures. He was recently hospitalized for similar symptoms and discharged with prescriptions for dicyclomine and ondansetron. Although he experienced some improvement after discharge, his condition worsened following the use of marijuana, particularly after consuming an edible. He has vomited once or twice, with the emesis being non-bilious and non-bloody, and has since experienced only dry heaving. The patient is unable to tolerate oral intake and is exhibiting signs of dehydration. He denies experiencing chest pain, palpitations, dysuria, urgency, frequency, cough, shortness of breath, or fever, but does report having chills.     Social History  He reports that he has never smoked. He has never used smokeless tobacco. He reports current drug use. Drug: Marijuana. No history on file for alcohol use. Lives at home with girlfriend      Allergies  Coconut, Banana, and Hydrocodone-acetaminophen    Current Outpatient Medications   Medication Instructions    dicyclomine (BENTYL) 10 mg, oral, 4 times daily PRN    lidocaine 4 % patch 1 patch, transdermal, Daily, Remove & discard patch within 12 hours or as directed by MD.    ondansetron ODT (ZOFRAN-ODT) 4 mg, oral, Every 8 hours PRN      Visit Vitals  /73   Pulse 78   Temp 36.4 °C (97.5 °F)   Resp 16   Ht 1.727 m (5' 8\")   Wt 68 kg (150 lb)   SpO2 98%   BMI 22.81 kg/m²   Smoking Status Never   BSA 1.81 m²     Physical exam:    General:   - " "Tatted adult male, no edema, no rash  - No pallor or icterus  - Throat non erythematous   - No lymphadenopathy     CVS:   - RRR  - S1S2  - No murmurs     Chest:   - B/L equal air entry  - Mild expiratory wheeze B/L   - Normal WOB   - Normal chest wall movement    Abdomen:  - Greenish semisolid ostomy output   - Soft, non-distended abdomen   - Non tender on deep palpation   - Hyperactive bowel sound   - Site of colostomy bag appears clean, without any skin breakdown, erythema or significant tenderness.    Neuro:   - Non encephalopathic   - No focal deficits   - Generalized, symmetric, involuntary movement - likely rigors   - Chvostek sign absent     Labs:  Most recent labs:   Results from last 7 days   Lab Units 03/07/25  0938   WBC AUTO x10*3/uL 13.9*   HEMOGLOBIN g/dL 19.9*   HEMATOCRIT % 53.4*   PLATELETS AUTO x10*3/uL 542*     Results from last 7 days   Lab Units 03/07/25  0938   SODIUM mmol/L 125*   POTASSIUM mmol/L 5.3   CHLORIDE mmol/L 89*   CO2 mmol/L 20*   ANION GAP mmol/L 21*   BUN mg/dL 54*   CREATININE mg/dL 3.09*   GLUCOSE mg/dL 95   CALCIUM mg/dL 10.4   MAGNESIUM mg/dL 3.00*      Results from last 7 days   Lab Units 03/07/25  0938   ALT U/L 18   AST U/L 24   ALK PHOS U/L 105          Results from last 7 days   Lab Units 03/07/25  0924   POCT PH, VENOUS pH 7.35   POCT PCO2, VENOUS mm Hg 35*           No lab exists for component: \"BNPRESU\", \"CPKT\"  Results from last 7 days   Lab Units 03/07/25  0938   LIPASE U/L 76       3/7 CXR:     No evidence of acute cardiopulmonary process.     2/19 CT AP:    1.  No CT evidence of acute intra-abdominal findings.  2.  Stable postoperative changes and additional chronic findings as  described above.    Assessment and plan:    Ingrid Maurice is a 34 y.o. male with PMHx of Faust syndrome s/p proctocolectomy and end ileostomy and J pouch, R nephrectomy (2018), mesenteric desmoid tumor, abdominal fibromatosis, and bipolar disorder who presented with nausea, vomiting " and abdominal pain. Acute abdomen unlikely given benign abdominal exam, similar presentation in the past, known trigger - marijuana and normal laboratory parameters. Likely cannabis hyperemesis. Other differentials include ileitis (hyperactive bowel sound, leukocytosis, cramps) and partial bowel obstruction (hyperactive bowel sound, pain, cramps, nausea, vomiting). Will manage symptomatically and will monitor closely. Patient also developed FABRICIO and electrolyte imbalance in setting of decreased intake and increased GI loss - FABRICIO likely prerenal - repleting fluid and electrolyte as needed. Patient has leukocytosis, chills and noisy breathing - there is possibility of acute bronchitis.     #Prerenal FABRICIO #Hyponatremia #R Nephrectomy (2018)  - Cr 3.09, BL 1.2-1.3  - S/P 1 L LR in ER  - Started on maintenance  ml/ hr   - Replete electrolyte as needed   - Avoid nephrotoxic drugs   - Renally dose medications   - Encourage oral fluid intake  - Strict I and O     #Nausea/Vomiting/Abdominal Cramps  - Cannabis hyperemesis vs. ileitis vs. partial bowel obstruction.  - Zofran PRN for nausea / vomiting   - Dicyclomine PRN for abdomin pain / cramps   - Capsicum topical cream for abdominal pain   - Adult regular diet as tolerated  - Strict I and O   - F/U stool pathogen results  - If increasing pain - consider Xray KUB    #Substance use   - F/U Utox and blood alcohol level  - CIWA protocol is alcohol positive    #Noisy breathing   - Rule out acute bronchitis vs. mucus plugging: wheezing unchanged with vigorous coughing  - CXR 2 views    #Bipolar disorder  - Not on home medications     #Faust syndrome s/p proctocolectomy and end ileostomy and J pouch  - Normal output based on intake      Checklist:   F : On maintenance fluid   E: replete lytes prn, K>4, Mg >2  N: Regular  A: PIV  DVT prophylaxis: not indicated  GI prophylaxis: not indicated  Code Status: Full   EC: Briana Barker (Girlfriend) - [phone]      Pasquale Adhikari MD    PGY-1 Neurology

## 2025-03-07 NOTE — PROGRESS NOTES
Attempt made to reach patient for ANABEL after provider follow up appointment outreach. Unable to reach patient. Will continue to monitor through transition period.    Sola Hightower RN, [facility] [place]  Phone [phone]

## 2025-03-07 NOTE — PROGRESS NOTES
Pharmacy Medication History Review    Ingrid Maurice is a 34 y.o. male admitted for Nausea and vomiting, unspecified vomiting type. Pharmacy reviewed the patient's [id number] medications and allergies for accuracy.    The list below reflects the updated PTA list.   Prior to Admission Medications   Prescriptions Last Dose Informant   dicyclomine (Bentyl) 10 mg capsule  Other   Sig: Take 1 capsule (10 mg) by mouth 4 times a day as needed (abdominal pain or cramps).   lidocaine 4 % patch  Other   Sig: Place 1 patch over 12 hours on the skin once daily. Remove & discard patch within 12 hours or as directed by MD.   ondansetron ODT (Zofran-ODT) 4 mg disintegrating tablet  Other   Sig: Dissolve 1 tablet (4 mg) in the mouth every 8 hours if needed for nausea or vomiting.      Facility-Administered Medications: None        The list below reflects the updated allergy list. Please review each documented allergy for additional clarification and justification.  Allergies  Reviewed by Luzma Toledo RN on 3/7/2025        Severity Reactions Comments    Coconut High Anaphylaxis, Swelling Throat Swelling    Banana Not Specified Hives     Hydrocodone-acetaminophen Not Specified Other, Hives tolerated 09/12 without reaction            Patient was unable to be assessed for M2B at discharge.     Sources used to complete the med history include:    [facility]  Pharmacy dispense history  Patient interview Unable to provide any details  Chart Review  Care Everywhere  Pharmacy med-rec note on 2/20/25      Below are additional concerns with the patient's PTA list.  Patient declined to participate in an interview.     Medications ADDED:  none  Medications CHANGED:  none  Medications REMOVED:   none    Charlene Sol PharmD  Transitions of Care Pharmacist  [facility]  Please reach out via Secure Chat for questions, or if no response call [facility] or [facility]

## 2025-03-07 NOTE — ED PROVIDER NOTES
History of Present Illness     History provided by: Patient   Limitations to History: None   External Records Reviewed with Brief Summary: I reviewed his discharge summary from 2/20/2025 where he was admitted for similar symptoms.    HPI:  Ingrid Maurice is a 34 y.o. male history of Faust syndrome (s/p proctocolectomy, end ileostomy, J pouch), hydronephrosis (s/p R nephrectomy 2018), mesenteric desmoid tumor, abdominal fibromatosis, and bipolar disorder.  He is presenting to the emergency department today with concern for abdominal pain, cramps, nausea, vomiting.  Patient notes that his symptoms got better when he was discharged recently however worsened and he has been unable to keep anything down.  He notes that the symptoms get worse after he smokes marijuana tried an edible instead and the symptoms did not get better.  He notes that his abdominal pain and cramping gets worse when he has had nothing to eat or drink.  He notes that now he is only having dry heaves as he has not had anything to eat or drink and has nothing left to vomit out.  Notes that he is having generalized pain everywhere as well.  Denies any shortness of breath, fever, chills, urinary symptoms.  Notes that he is been taking Zofran at home without significant relief in his symptoms.  No other associated signs or symptoms at this time.    Physical Exam   Triage vitals:  T 36.4 °C (97.5 °F)  HR (!) 115  BP (!) 124/91  RR 16  O2 96 %      General: Awake, alert, in no acute distress  Eyes: Gaze conjugate.  No scleral icterus or injection  HENT: Normo-cephalic, atraumatic. No stridor.  Dry mucous membranes.  CV: Tachycardic rate, regular rhythm. Radial pulses 2+ bilaterally  Resp: Breathing non-labored, speaking in full sentences.  Clear to auscultation bilaterally  GI: Soft, non-distended, diffusely tender. No rebound or guarding.  Colostomy bag with dark green tarry stool.  (Per patient this occurs when he has not had anything to eat or  drink).  Site of colostomy bag appears clean, without any skin breakdown, erythema or significant tenderness.  MSK/Extremities: No gross bony deformities. Moving all extremities  Skin: Warm. Appropriate color  Neuro: Alert. Oriented. Face symmetric. Speech is fluent.  Gross strength and sensation intact in b/l UE and LEs  Psych: Appropriate mood and affect    Medical Decision Making & ED Course   Medical Decision Makin y.o. male with the above past medical she presenting to the emergency department today for nausea vomiting in the setting of marijuana use and Faust syndrome.  Will give the patient fluids, he is tachycardic, dry mucous membranes, likely dehydrated.  Will also obtain labs.  Treat with morphine and Zofran and reevaluate.  These are very similar symptoms to what he came in with little over 2 weeks ago, will attempt to management here in the emergency department.  Patient symptoms improved while in the emergency department however reviewing his patient's lab work, significant concern for dehydration in addition to his concerning medical history, elevated creatinine, FABRICIO, significant lab derangements patient would benefit for continued monitoring in the hospital and management.  I did consider obtaining a CT of the abdomen pelvis however he had a scan done on 2025 without any concerning findings.  His abdomen on reevaluation is benign, has no pain or tenderness, not peritonitic.  Will hold off on imaging at this time.  He has received 4 of morphine, 4 of Zofran, 1 dose of droperidol here in the emergency department.  Additionally received a liter LR bolus in addition to starting him on normal saline maintenance fluids.  Will admit him to medicine for further management of symptoms.  Patient is agreeable with this plan.  I did speak to him about cessation of marijuana use as his symptoms could be related to this he seemed to agree.  ----    Differential diagnoses considered include but are not  limited to: Nausea vomiting, hyperemesis, dehydration     Social Determinants of Health which Significantly Impact Care: None identified     EKG Independent Interpretation: See ED Course    Independent Result Review and Interpretation: See ED course    Chronic conditions affecting the patient's care: See HPI    The patient was discussed with the following consultants/services: Admission Coordinator who accepted the patient for admission    Care Considerations: See [facility] [place]    ED Course:  ED Course as of 03/07/25 1321   Fri Mar 07, 2025   1022 Patient was discharged on 2/20/2025 after hospitalization for abdominal flank pain nausea and vomiting with plan for GI referral and Zofran as needed for nausea.  History of Faust syndrome.  Surgical history layo colectomy with end ileostomy and J-pouch, ileostomy revision, right nephrectomy for invasion of right ureter by mesenteric desmoid tumor causing hydronephrosis.  I reviewed most recent abdominal imaging CT abdomen pelvis on 2/19/2025 that demonstrated no acute intra-abdominal findings.  Patient reports that after returning home he felt well for short time before symptoms of nausea and vomiting and generalized abdominal pain returned.  Reports he has been unable to eat solid food for 2 days.  Has been able to sip fluids.  Decreased ileostomy output.  Triage vital signs notable for heart rate 115.  Dry lips and oral mucosa.  Abdomen soft and nondistended with small amount of soft green output in bag. [MC]   1149 WBC(!): 13.9  Patient has not not had any fever or chills, likely inflammatory reaction to the continued nausea and vomiting.  Additionally could be secondary to hemoconcentration and dehydration. [KD]   1150 HEMOGLOBIN(!): 19.9  Likely hemoconcentrated in the setting of dehydration. [KD]   1154 Troponin I, High Sensitivity [KD]   1217 Creatinine(!): 3.09 [KD]   1232 ECG 12 lead  Interpreted by me.  3/7/2025 at 0835.  Sinus tachycardia 130 bpm.  , QRS 80, QTc  412.  Appears similar to previous EKG from 2/19/2025. [MC]   1305 I went to go reevaluate the patient, abdomen is currently benign.  He has no significant tenderness, states that the medications have significantly helped.  Vitals have also improved.  Will reach out to  For admission.  Will hold off on any CT imaging at this time, I reviewed his imaging from the 19th.    IMPRESSION:  1. No CT evidence of acute intra-abdominal findings.  2. Stable postoperative changes and additional chronic findings as  described above. [KD]      ED Course User Index  [KD] Nury Hernandez DO  [MC] Jonny Sharma MD         Diagnoses as of 03/07/25 1321   Nausea and vomiting, unspecified vomiting type   FABRICIO (acute kidney injury) (CMS-HCC)   Dehydration     Disposition   As a result of their workup, the patient will require admission to the hospital.  The patient was informed of his diagnosis.  The patient was given the opportunity to ask questions and I answered them. The patient agreed to be admitted to the hospital.    Seen and discussed with ED Attending  Nury Hernandez DO, PGY-2  Emergency Medicine     Nury Hernandez DO  Resident  03/07/25 1323

## 2025-03-07 NOTE — ED TRIAGE NOTES
C/o abd pain, N/V. H/o Faust syndrome (s/p proctocolectomy, end ileostomy, J pouch), hydronephrosis (s/p R nephrectomy 2018), mesenteric desmoid tumor, abdominal fibromatosis. Recently DC from hospital admission

## 2025-03-08 NOTE — DISCHARGE SUMMARY
Discharge Diagnosis  Nausea and vomiting, unspecified vomiting type    Issues Requiring Follow-Up  - FABRICIO  - Substance use disorder    Discharge Meds     Medication List      ASK your doctor about these medications     dicyclomine 10 mg capsule; Commonly known as: Bentyl; Take 1 capsule (10   mg) by mouth 4 times a day as needed (abdominal pain or cramps).   lidocaine 4 % patch; Place 1 patch over 12 hours on the skin once daily.   Remove & discard patch within 12 hours or as directed by MD.   ondansetron ODT 4 mg disintegrating tablet; Commonly known as:   Zofran-ODT; Dissolve 1 tablet (4 mg) in the mouth every 8 hours if needed   for nausea or vomiting.       Test Results Pending At Discharge  Pending Labs       Order Current Status    Blood Gas Venous Full Panel In process            Hospital Course   This is a 34-year-old male with past medical history of Faust syndrome presenting to the emergency department with nausea vomiting and abdominal pain.  Patient was also found to have an FABRICIO likely in the setting of dehydration.  Patient received IV fluid bolus along with symptom control.  Low concern for infection.  Patient was admitted to the right medicine service for further management however on 3/7 at 7 PM patient eloped from the emergency department.  RN notified primary team.  Unfortunately patient had eloped prior to being able to speak with someone about the risks and benefits of leaving the hospital.    Pertinent Physical Exam At Time of Discharge  Unable to perform physical exam as patient has eloped        2/20/2025     3:27 PM 3/7/2025     8:37 AM 3/7/2025    11:18 AM 3/7/2025     1:43 PM 3/7/2025     2:07 PM 3/7/2025     2:08 PM 3/7/2025     3:30 PM   Vitals   Systolic 102 124 126 135 120  118   Diastolic 69 91 87 73 98  86   BP Location Right arm      Left arm   Heart Rate 93 115 87 78 100  101   Temp 37 °C (98.6 °F) 36.4 °C (97.5 °F)    36.6 °C (97.9 °F)    Resp 18 16 16 16   18   Height  1.727 m  "(5' 8\")        Weight (lb)  150        BMI  22.81 kg/m2        BSA (m2)  1.81 m2             Outpatient Follow-Up  Future Appointments   Date Time Provider Department Center   3/25/2025  4:00 PM [facility] [place] [facility]         Kae Trejo MD  "

## 2025-03-08 NOTE — SIGNIFICANT EVENT
Notified by emergency department RN that patient eloped from the ED this evening. Around 1900, patient's bedside nurse called bed assignment upstairs and patient was found to not be in his room. Patient initially evaluated by [facility] team earlier in day with plans for admission and treatment. Notified [facility] team attending, Dr. Cruz, via D[facility] of elopement. Unfortunately, patient eloped prior to this writer being able to discuss risks of leaving the hospital without further treatment.

## 2025-03-10 ENCOUNTER — PATIENT OUTREACH (OUTPATIENT)
Dept: CARE COORDINATION | Facility: CLINIC | Age: 35
End: 2025-03-10
Payer: COMMERCIAL

## 2025-03-10 NOTE — PROGRESS NOTES
Several attempts made to reach patient for ANABEL outreach and no contact made.    Sola Hightower RN, [facility] [place]  Phone [phone]

## 2025-03-25 ENCOUNTER — HOSPITAL ENCOUNTER (OUTPATIENT)
Dept: RADIOLOGY | Facility: HOSPITAL | Age: 35
Discharge: HOME | End: 2025-03-25
Payer: COMMERCIAL

## 2025-03-25 DIAGNOSIS — Z00.6 ENCOUNTER FOR EXAMINATION FOR NORMAL COMPARISON AND CONTROL IN CLINICAL RESEARCH PROGRAM: ICD-10-CM

## 2025-03-25 PROCEDURE — A9575 INJ GADOTERATE MEGLUMI 0.1ML: HCPCS | Mod: SE | Performed by: INTERNAL MEDICINE

## 2025-03-25 PROCEDURE — 2550000001 HC RX 255 CONTRASTS: Mod: SE | Performed by: INTERNAL MEDICINE

## 2025-03-25 PROCEDURE — 70553 MRI BRAIN STEM W/O & W/DYE: CPT

## 2025-03-25 RX ORDER — GADOTERATE MEGLUMINE 376.9 MG/ML
13 INJECTION INTRAVENOUS
Status: COMPLETED | OUTPATIENT
Start: 2025-03-25 | End: 2025-03-25

## 2025-03-25 RX ADMIN — GADOTERATE MEGLUMINE 13 ML: 376.9 INJECTION INTRAVENOUS at 16:43
